# Patient Record
Sex: FEMALE | Race: BLACK OR AFRICAN AMERICAN | Employment: UNEMPLOYED | URBAN - METROPOLITAN AREA
[De-identification: names, ages, dates, MRNs, and addresses within clinical notes are randomized per-mention and may not be internally consistent; named-entity substitution may affect disease eponyms.]

---

## 2018-02-05 ENCOUNTER — DOCUMENTATION ONLY (OUTPATIENT)
Dept: SURGERY | Age: 58
End: 2018-02-05

## 2018-02-05 NOTE — PROGRESS NOTES
Requests faxed to [facility] Pathology Dept and Radiology Dept for slides and film disc to be mailed. Pt was referred from Dr. Kota Bang to Dr. Patience Raygoza for surgical consultation for newly diagnosed breast CA (Dr. Jj Orozco doesn't accept pt's insurance). Pt's appt is scheduled for 2/13/18 at 1400.

## 2018-02-13 ENCOUNTER — OFFICE VISIT (OUTPATIENT)
Dept: SURGERY | Age: 58
End: 2018-02-13

## 2018-02-13 VITALS
SYSTOLIC BLOOD PRESSURE: 108 MMHG | HEART RATE: 88 BPM | WEIGHT: 115 LBS | TEMPERATURE: 97.9 F | DIASTOLIC BLOOD PRESSURE: 78 MMHG | RESPIRATION RATE: 16 BRPM

## 2018-02-13 DIAGNOSIS — C50.911 STAGE 1 BREAST CANCER, ER-, RIGHT (HCC): Primary | ICD-10-CM

## 2018-02-13 DIAGNOSIS — Z17.1 STAGE 1 BREAST CANCER, ER-, RIGHT (HCC): Primary | ICD-10-CM

## 2018-02-13 NOTE — PROGRESS NOTES
[facility]  General Surgery    Subjective:      HPI: Patient is a very pleasant 59-year-old female with a past medical history is remarkable for hepatitis B and tuberculosis. She is referred by Dr. Racheal Jiménez for evaluation and management of a stage I T1 NXMX ER negative TN negative HER-2 positive right breast cancer. The patient had a palpable mass in the upper outer quadrant of the right breast which have been present since 2016. Mammograms and ultrasounds were performed. I did review the ultrasound independently on the monitor. I discussed the findings on the pathology report with the patient and her female friend. I described the meaning of the receptor status and his implications and treatment with hormonal therapy and in this case Herceptin and perjetta. I described the staging workup which includes bilateral breast MRI, CT chest abdomen pelvis and whole-body bone scan. There are no active problems to display for this patient. Past Medical History:   Diagnosis Date    Hepatitis B     Tuberculosis 1980      Past Surgical History:   Procedure Laterality Date    HX GYN      hysterectomy     HX OTHER SURGICAL      neck surgery       History reviewed. No pertinent family history. Social History   Substance Use Topics    Smoking status: Never Smoker    Smokeless tobacco: Current User    Alcohol use 1.2 oz/week     2 Cans of beer per week      Comment: weekly       No Known Allergies    Prior to Admission medications    Not on File       Review of Systems:    14 systems were reviewed. The results are as above in the HPI and otherwise negative. Objective:       Physical Exam:  GENERAL: alert, cooperative, no distress, appears stated age,   EYE: conjunctivae/corneas clear. PERRL, EOM's intact.  Fundi benign,  THROAT & NECK: normal and no erythema or exudates noted. ,    LYMPHATIC: Cervical, supraclavicular, and axillary nodes normal. ,   LUNG: clear to auscultation bilaterally,   HEART: regular rate and rhythm, S1, S2 normal, no murmur, click, rub or gallop  BREAST: B cup breasts   With the patient's hands above her head and her sitting upright position the mass in the upper outer quadrant of the right breast is obvious. Right: Axillary and supraclavicular lymph nodes are negative  Palpable mass immobile measures approximately 2 x 0.5 cm  Left: Axillary and supraclavicular lymph nodes are negative  No palpable masses or nipple drainage or discharge can be expressed. ABDOMEN: soft, non-tender. Bowel sounds normal. No masses,  no organomegaly,  EXTREMITIES:  extremities normal, atraumatic, no cyanosis or edema,   SKIN: Normal.,   NEUROLOGIC: AOx3. Gait normal. Reflexes and motor strength normal and symmetric. Cranial nerves 2-12 and sensation grossly intact. ,     Data Review: Mammography     Ms. Elizabeth Monson has a reminder for a \"due or due soon\" health maintenance. I have asked that she contact her primary care provider for follow-up on this health maintenance. Impression:     · Patient with stage I ER CA negative HER-2 positive right breast cancer    Plan:     · Referral to medical oncology to discuss neoadjuvant therapy  · Staging workup to include CT chest abdomen pelvis, whole body bone scan and bilateral breast MRI. · The patient will follow with us once the consultations and preoperative testing have been completed.     Signed By: Francisco Stern MD     February 13, 2018

## 2018-02-14 ENCOUNTER — HOSPITAL ENCOUNTER (OUTPATIENT)
Dept: LAB | Age: 58
Discharge: HOME | End: 2018-02-14
Payer: MEDICAID

## 2018-02-14 ENCOUNTER — HOSPITAL ENCOUNTER (OUTPATIENT)
Dept: ONCOLOGY | Age: 58
Discharge: HOME | End: 2018-02-14

## 2018-02-14 ENCOUNTER — OFFICE VISIT (OUTPATIENT)
Dept: ONCOLOGY | Age: 58
End: 2018-02-14

## 2018-02-14 VITALS — WEIGHT: 112.4 LBS | SYSTOLIC BLOOD PRESSURE: 132 MMHG | DIASTOLIC BLOOD PRESSURE: 79 MMHG

## 2018-02-14 DIAGNOSIS — Z17.1 MALIGNANT NEOPLASM OF UPPER-OUTER QUADRANT OF RIGHT BREAST IN FEMALE, ESTROGEN RECEPTOR NEGATIVE (HCC): ICD-10-CM

## 2018-02-14 DIAGNOSIS — C50.411 MALIGNANT NEOPLASM OF UPPER-OUTER QUADRANT OF RIGHT BREAST IN FEMALE, ESTROGEN RECEPTOR NEGATIVE (HCC): ICD-10-CM

## 2018-02-14 DIAGNOSIS — C50.411 MALIGNANT NEOPLASM OF UPPER-OUTER QUADRANT OF RIGHT BREAST IN FEMALE, ESTROGEN RECEPTOR NEGATIVE (HCC): Primary | ICD-10-CM

## 2018-02-14 DIAGNOSIS — Z17.1 MALIGNANT NEOPLASM OF UPPER-OUTER QUADRANT OF RIGHT BREAST IN FEMALE, ESTROGEN RECEPTOR NEGATIVE (HCC): Primary | ICD-10-CM

## 2018-02-14 PROBLEM — B19.11: Status: ACTIVE | Noted: 2018-02-14

## 2018-02-14 LAB
ALBUMIN SERPL-MCNC: 3.7 G/DL (ref 3.4–5)
ALBUMIN/GLOB SERPL: 0.9 {RATIO} (ref 0.8–1.7)
ALP SERPL-CCNC: 69 U/L (ref 45–117)
ALT SERPL-CCNC: 16 U/L (ref 13–56)
ANION GAP SERPL CALC-SCNC: 8 MMOL/L (ref 3–18)
AST SERPL-CCNC: 12 U/L (ref 15–37)
BASO+EOS+MONOS # BLD AUTO: 0.5 K/UL (ref 0–2.3)
BASO+EOS+MONOS # BLD AUTO: 8 % (ref 0.1–17)
BILIRUB SERPL-MCNC: 0.6 MG/DL (ref 0.2–1)
BUN SERPL-MCNC: 11 MG/DL (ref 7–18)
BUN/CREAT SERPL: 18 (ref 12–20)
CALCIUM SERPL-MCNC: 9.5 MG/DL (ref 8.5–10.1)
CHLORIDE SERPL-SCNC: 105 MMOL/L (ref 100–108)
CO2 SERPL-SCNC: 26 MMOL/L (ref 21–32)
CREAT SERPL-MCNC: 0.61 MG/DL (ref 0.6–1.3)
DIFFERENTIAL METHOD BLD: ABNORMAL
ERYTHROCYTE [DISTWIDTH] IN BLOOD BY AUTOMATED COUNT: 14.3 % (ref 11.5–14.5)
GLOBULIN SER CALC-MCNC: 4.3 G/DL (ref 2–4)
GLUCOSE SERPL-MCNC: 80 MG/DL (ref 74–99)
HCT VFR BLD AUTO: 36.1 % (ref 36–48)
HGB BLD-MCNC: 11.7 G/DL (ref 12–16)
LYMPHOCYTES # BLD: 2 K/UL (ref 1.1–5.9)
LYMPHOCYTES NFR BLD: 29 % (ref 14–44)
MCH RBC QN AUTO: 30.1 PG (ref 25–35)
MCHC RBC AUTO-ENTMCNC: 32.4 G/DL (ref 31–37)
MCV RBC AUTO: 92.8 FL (ref 78–102)
NEUTS SEG # BLD: 4.2 K/UL (ref 1.8–9.5)
NEUTS SEG NFR BLD: 62 % (ref 40–70)
PLATELET # BLD AUTO: 211 K/UL (ref 140–440)
POTASSIUM SERPL-SCNC: 3.6 MMOL/L (ref 3.5–5.5)
PROT SERPL-MCNC: 8 G/DL (ref 6.4–8.2)
RBC # BLD AUTO: 3.89 M/UL (ref 4.1–5.1)
SODIUM SERPL-SCNC: 139 MMOL/L (ref 136–145)
WBC # BLD AUTO: 6.7 K/UL (ref 4.5–13)

## 2018-02-14 PROCEDURE — 86300 IMMUNOASSAY TUMOR CA 15-3: CPT | Performed by: INTERNAL MEDICINE

## 2018-02-14 PROCEDURE — 36415 COLL VENOUS BLD VENIPUNCTURE: CPT | Performed by: INTERNAL MEDICINE

## 2018-02-14 PROCEDURE — 80053 COMPREHEN METABOLIC PANEL: CPT | Performed by: INTERNAL MEDICINE

## 2018-02-14 RX ORDER — FAMOTIDINE 40 MG/1
40 TABLET, FILM COATED ORAL
COMMUNITY
End: 2018-06-06 | Stop reason: SDUPTHER

## 2018-02-14 RX ORDER — SUCRALFATE 1 G/1
1 TABLET ORAL
COMMUNITY
End: 2018-10-16

## 2018-02-14 NOTE — PATIENT INSTRUCTIONS
Breast Cancer: Care Instructions  Your Care Instructions    Breast cancer occurs when abnormal cells grow out of control in the breast. These cancer cells can spread within the breast, to nearby lymph nodes and other tissues, and to other parts of the body. Being treated for cancer can weaken your body, and you may feel very tired. Get the rest your body needs so you can feel better. Finding out that you have cancer is scary. You may feel many emotions and may need some help coping. Seek out family, friends, and counselors for support. You also can do things at home to make yourself feel better while you go through treatment. Call the [facility] (2-469.400.3969) or visit its website at [employer] for more information. Follow-up care is a key part of your treatment and safety. Be sure to make and go to all appointments, and call your doctor if you are having problems. It's also a good idea to know your test results and keep a list of the medicines you take. How can you care for yourself at home? · Take your medicines exactly as prescribed. Call your doctor if you think you are having a problem with your medicine. You may get medicine for nausea and vomiting if you have these side effects. · Follow your doctor's instructions to relieve pain. Pain from cancer and surgery can almost always be controlled. Use pain medicine when you first notice pain, before it becomes severe. · Eat healthy food. If you do not feel like eating, try to eat food that has protein and extra calories to keep up your strength and prevent weight loss. Drink liquid meal replacements for extra calories and protein. Try to eat your main meal early. · Get some physical activity every day, but do not get too tired. Keep doing the hobbies you enjoy as your energy allows. · Do not smoke. Smoking can make your cancer worse. If you need help quitting, talk to your doctor about stop-smoking programs and medicines.  These can increase your chances of quitting for good. · Take steps to control your stress and workload. Learn relaxation techniques. ¨ Share your feelings. Stress and tension affect our emotions. By expressing your feelings to others, you may be able to understand and cope with them. ¨ Consider joining a support group. Talking about a problem with your spouse, a good friend, or other people with similar problems is a good way to reduce tension and stress. ¨ Express yourself through art. Try writing, crafts, dance, or art to relieve stress. Some dance, writing, or art groups may be available just for people who have cancer. ¨ Be kind to your body and mind. Getting enough sleep, eating a healthy diet, and taking time to do things you enjoy can contribute to an overall feeling of balance in your life and can help reduce stress. ¨ Get help if you need it. Discuss your concerns with your doctor or counselor. · If you are vomiting or have diarrhea:  ¨ Drink plenty of fluids (enough so that your urine is light yellow or clear like water) to prevent dehydration. Choose water and other caffeine-free clear liquids. If you have kidney, heart, or liver disease and have to limit fluids, talk with your doctor before you increase the amount of fluids you drink. ¨ When you are able to eat, try clear soups, mild foods, and liquids until all symptoms are gone for 12 to 48 hours. Other good choices include dry toast, crackers, cooked cereal, and gelatin dessert, such as Jell-O.  · If you have not already done so, prepare a list of advance directives. Advance directives are instructions to your doctor and family members about what kind of care you want if you become unable to speak or express yourself. When should you call for help? Call 911 anytime you think you may need emergency care. For example, call if:  ? · You passed out (lost consciousness). ?Call your doctor now or seek immediate medical care if:  ? · You have a fever. ? · You have abnormal bleeding. ? · You think you have an infection. ? · You have new or worse pain. ? · You have new symptoms, such as a cough, belly pain, vomiting, diarrhea, or a rash. ? Watch closely for changes in your health, and be sure to contact your doctor if:  ? · You are much more tired than usual.   ? · You have swollen glands in your armpits, groin, or neck. ? · You do not get better as expected. Where can you learn more? Go to http://catalino-tiesha.info/. Enter V321 in the search box to learn more about \"Breast Cancer: Care Instructions. \"  Current as of: May 12, 2017  Content Version: 11.4  © 9589-3978 [employer]. Care instructions adapted under license by [employer] (which disclaims liability or warranty for this information). If you have questions about a medical condition or this instruction, always ask your healthcare professional. [address] any warranty or liability for your use of this information.

## 2018-02-14 NOTE — PROGRESS NOTES
Hematology/Oncology Consultation Note    Name: Lotus Castro  Date: 2018  : 1960    PROVIDER UNKNOWN       Ms. Darvin Horne  is a 62 y.o. -American woman who was referred for an evaluation of invasive ductal carcinoma involving the right breast.    Subjective:   Chief complaint: Breast cancer    History of present illness:  Ms. Darvin Horne is a 59-year-old -American woman who states that she noticed a palpable mass in her right breast in 2017. She subsequently had a mammogram and ultrasound done followed by a biopsy which was positive for invasive ductal adenocarcinoma, ER/UT negative and HER-2 positive. She was seen by a surgeon, Dr. Humble Marion,  but was subsequently referred to Dr. Jerry Holt, since Dr. Humble Marion did not accept her insurance. The patient denied having nipple discharge, nipple retraction, or skin dimpling. She denied having any pain in her right breast.  Based on these findings of HER-2 positive and hormone receptor negative she was referred here for an assessment and recommendations regarding neoadjuvant systemic therapy. The patient is not certain that she has any positive family history for breast cancer however she was told that her grandmother may have had breast cancer. Past Medical History:   Diagnosis Date    Hepatitis B     Tuberculosis        No Known Allergies    Past Surgical History:   Procedure Laterality Date    HX GYN      hysterectomy     HX OTHER SURGICAL      neck surgery        Social History     Social History    Marital status: SINGLE     Spouse name: N/A    Number of children: N/A    Years of education: N/A     Occupational History    Not on file.      Social History Main Topics    Smoking status: Never Smoker    Smokeless tobacco: Current User    Alcohol use 1.2 oz/week     2 Cans of beer per week      Comment: weekly     Drug use: No    Sexual activity: No     Other Topics Concern    Not on file     Social History Narrative       Family History   Problem Relation Age of Onset    Stroke Mother     Heart Disease Father     Cancer Maternal Grandmother        Review of Systems    General ROS:The patient has no complaints and there is no physical distress evident. Psychological ROS: patient denies having any psychological symptoms such as hallucinations, depression or anxiety. Ophthalmic ROS:the patient denies having any visual impairment or eye discomfort. ENT ROS: there are no abnormalities reported. Allergy and Immunology ROS:the patient denies having any seasonal allergies or allergies to medications other than those already outlined above. Hematological and Lymphatic ROS: the patient denies having any bruising, bleeding or lymphadenopathy. Endocrine ROS: the patient denies having any heat or cold intolerance. There is no history of diabetes or thyroid disorders. Breast ROS: the patient was recently diagnosed with an invasive ductal adenocarcinoma involving the right breast.  Respiratory ROS:the patient denies having any cough, shortness of breath, or dyspnea on exertion. Cardiovascular ROS: there are no complaints of chest pain, palpitations, chest pounding, or dyspnea on exertion. Gastrointestinal ROS: the patient denies having nausea, emesis, diarrhea, constipation, or blood in the stool. Genito-Urinary ROS: the patient denies having urinary urgency, frequency, or dysuria. Musculoskeletal ROS: with the exception of mild arthralgias the patient has no other musculoskeletal complaints. Neurological ROS: the patient denies having any numbness, tingling, or neurologic deficits. Dermatological ROS:patient denies having any unexplained rash, skin ulcerations, or hives. Objective:     Visit Vitals    /79 (BP 1 Location: Right arm)    Wt 51 kg (112 lb 6.4 oz)        Physical Exam:   Gen. Appearance: the patient is in no acute distress. Skin: There is no evidence of bruise or rash.   HEENT: The head is normocephalic and atraumatic. The conjunctiva and sclera are clear. Pupils are equal, round, reactive to light, and accommodation. The extraocular movements are intact. ENT reveals no oral mucosal lesions or ulcerations. Neck: Supple without lymphadenopathy or thyromegaly. Lungs: Clear to auscultation and percussion; there are no wheezes or rhonchi. Anterior chest wall and breast: The patient has a palpable mass lesion in the outer upper quadrant of her right breast grossly measuring about 3 cm in size. The right axilla reveals no palpable axillary lymphadenopathy. There is no nipple discharge, skin rash, or skin dimpling. The left breast shows no abnormality and there is no palpable lymphadenopathy in the left axilla. Heart: Regular rate and rhythm; there are no murmurs, gallops, or rubs. Abdomen: Bowel sounds are present and normal.  There is no guarding, tenderness, or hepatosplenomegaly. Extremities: There is no clubbing, cyanosis, or edema. Neurologic: There are no focal neurologic deficits. Lymphatics: There is no palpable peripheral lymphadenopathy. Lab data: The pathology report dated 1/30/2018 right breast biopsy revealed invasive carcinoma with apocrine features. There was also evidence of ductal carcinoma in situ. The estrogen receptor and progesterone receptors were negative however HER-2 was 3+ positive. Assessment:   Invasive ductal adenocarcinoma right breast, hormone receptor negative and HER-2 positive. I have explained to the patient and with her phenotype she is a candidate for neoadjuvant systemic therapy which will be followed by surgery. Plan:   Invasive ductal adenocarcinoma, right breast, hormone receptor negative, and HER-2 positive: At this time I am recommending that the patient proceed with the staging evaluation as recommended by Dr. Shama Jin which includes the CT scan through the chest, abdomen, and pelvis, whole body bone scan, and bilateral breast MRI.   The patient will need to have a Mediport inserted in preparation for neoadjuvant systemic therapy with a combination of docetaxel and a dose of 75 mg/m² IV on day 1, trastuzumab 8 mg/kg IV loading dose on day 1 then 6 mg/kg IV every 3 weeks, and pertuzumab and a loading dose of 840 mg IV on day 1 and then 420 mg IV every 3 weeks thereafter for about 6 cycles. This will be followed by surgery. Additionally we will obtain a baseline CA-27-29 level and the patient will be referred to the nuclear medicine department for a pretreatment MUGA scan. I will have her return to the clinic in about 3 weeks to finalize a treatment regimen after these procedures and tests have been completed. Follow-up in 3 weeks0    Orders Placed This Encounter    COMPLETE CBC & AUTO DIFF WBC    NM CARDIAC MUGA REST AND STRESS     Standing Status:   Future     Standing Expiration Date:   3/14/2019     Order Specific Question:   Reason for Exam     Answer:   per-treatment protocol    METABOLIC PANEL, COMPREHENSIVE     Standing Status:   Future     Number of Occurrences:   1     Standing Expiration Date:   2/15/2019    CA 27.29     Standing Status:   Future     Number of Occurrences:   1     Standing Expiration Date:   2/15/2019    InHouse CBC ([email or web address])     Standing Status:   Future     Number of Occurrences:   1     Standing Expiration Date:   2018    famotidine (PEPCID) 40 mg tablet     Si mg.    sucralfate (CARAFATE) 1 gram tablet     Si g. Pretty Doty MD  2018      Please note: This document has been produced using voice recognition software. Unrecognized errors in transcription may be present.

## 2018-02-14 NOTE — MR AVS SNAPSHOT
[address] [address] 
[phone] Patient: Vince Prescott MRN: [id number] YCI:8[birth year] Visit Information Date & Time Provider Department Dept. Phone Encounter #  
 2/14/2018  2:00 PM Stefanie Mcdowell MD [facility] [phone] [id number] Follow-up Instructions Return in about 3 weeks (around 3/7/2018). Your Appointments 3/5/2018  9:30 AM  
Office Visit with Stefanie Mcdowell MD  
[facility]) Appt Note: 3 WK RESULTS REVIEW  
 [address], [address] [place] [address] Upcoming Health Maintenance Date Due Pneumococcal 19-64 Highest Risk (1 of 3 - PCV13) 9/5/1979 DTaP/Tdap/Td series (1 - Tdap) 9/5/1981 PAP AKA CERVICAL CYTOLOGY 9/5/1981 BREAST CANCER SCRN MAMMOGRAM 9/5/2010 FOBT Q 1 YEAR AGE 50-75 9/5/2010 Influenza Age 5 to Adult 8/1/2017 Allergies as of 2/14/2018  Review Complete On: 2/13/2018 By: Cullen Ohara MD  
 No Known Allergies Current Immunizations  Never Reviewed No immunizations on file. Not reviewed this visit You Were Diagnosed With   
  
 Codes Comments Malignant neoplasm of upper-outer quadrant of right breast in female, estrogen receptor negative ([facility] [address]    -  Primary ICD-10-CM: C50.411, Z17.1 ICD-9-CM: 174.4, V86.1 Vitals BP Weight(growth percentile) OB Status Smoking Status 132/79 (BP 1 Location: Right arm) 112 lb 6.4 oz (51 kg) Hysterectomy Never Smoker Your Updated Medication List  
  
Notice  As of 2/14/2018  2:14 PM  
 You have not been prescribed any medications. Follow-up Instructions Return in about 3 weeks (around 3/7/2018). To-Do List   
 02/14/2018 Lab:  CA 27.29   
  
 02/14/2018 Lab: METABOLIC PANEL, COMPREHENSIVE Patient Instructions Breast Cancer: Care Instructions Your Care Instructions Breast cancer occurs when abnormal cells grow out of control in the breast. These cancer cells can spread within the breast, to nearby lymph nodes and other tissues, and to other parts of the body. Being treated for cancer can weaken your body, and you may feel very tired. Get the rest your body needs so you can feel better. Finding out that you have cancer is scary. You may feel many emotions and may need some help coping. Seek out family, friends, and counselors for support. You also can do things at home to make yourself feel better while you go through treatment. Call the [employer] (8-851.722.5157) or visit its website at Pluto Media8 Artimi for more information. Follow-up care is a key part of your treatment and safety. Be sure to make and go to all appointments, and call your doctor if you are having problems. It's also a good idea to know your test results and keep a list of the medicines you take. How can you care for yourself at home? · Take your medicines exactly as prescribed. Call your doctor if you think you are having a problem with your medicine. You may get medicine for nausea and vomiting if you have these side effects. · Follow your doctor's instructions to relieve pain. Pain from cancer and surgery can almost always be controlled. Use pain medicine when you first notice pain, before it becomes severe. · Eat healthy food. If you do not feel like eating, try to eat food that has protein and extra calories to keep up your strength and prevent weight loss. Drink liquid meal replacements for extra calories and protein. Try to eat your main meal early. · Get some physical activity every day, but do not get too tired. Keep doing the hobbies you enjoy as your energy allows. · Do not smoke. Smoking can make your cancer worse.  If you need help quitting, talk to your doctor about stop-smoking programs and medicines. These can increase your chances of quitting for good. · Take steps to control your stress and workload. Learn relaxation techniques. ¨ Share your feelings. Stress and tension affect our emotions. By expressing your feelings to others, you may be able to understand and cope with them. ¨ Consider joining a support group. Talking about a problem with your spouse, a good friend, or other people with similar problems is a good way to reduce tension and stress. ¨ Express yourself through art. Try writing, crafts, dance, or art to relieve stress. Some dance, writing, or art groups may be available just for people who have cancer. ¨ Be kind to your body and mind. Getting enough sleep, eating a healthy diet, and taking time to do things you enjoy can contribute to an overall feeling of balance in your life and can help reduce stress. ¨ Get help if you need it. Discuss your concerns with your doctor or counselor. · If you are vomiting or have diarrhea: ¨ Drink plenty of fluids (enough so that your urine is light yellow or clear like water) to prevent dehydration. Choose water and other caffeine-free clear liquids. If you have kidney, heart, or liver disease and have to limit fluids, talk with your doctor before you increase the amount of fluids you drink. ¨ When you are able to eat, try clear soups, mild foods, and liquids until all symptoms are gone for 12 to 48 hours. Other good choices include dry toast, crackers, cooked cereal, and gelatin dessert, such as Jell-O. · If you have not already done so, prepare a list of advance directives. Advance directives are instructions to your doctor and family members about what kind of care you want if you become unable to speak or express yourself. When should you call for help? Call 911 anytime you think you may need emergency care. For example, call if: 
? · You passed out (lost consciousness). ?Call your doctor now or seek immediate medical care if: 
? · You have a fever. ? · You have abnormal bleeding. ? · You think you have an infection. ? · You have new or worse pain. ? · You have new symptoms, such as a cough, belly pain, vomiting, diarrhea, or a rash. ? Watch closely for changes in your health, and be sure to contact your doctor if: 
? · You are much more tired than usual.  
? · You have swollen glands in your armpits, groin, or neck. ? · You do not get better as expected. Where can you learn more? Go to http://catalino-tiesha.info/. Enter V321 in the search box to learn more about \"Breast Cancer: Care Instructions. \" Current as of: May 12, 2017 Content Version: 11.4 © 1614-6644 Zyncd. Care instructions adapted under license by [employer] (which disclaims liability or warranty for this information). If you have questions about a medical condition or this instruction, always ask your healthcare professional. [place] [address] any warranty or liability for your use of this information. Introducing \[facility]\"" & HEALTH SERVICES! [facility] introduces SummuS Render patient portal. Now you can access parts of your medical record, email your doctor's office, and request medication refills online. 1. In your internet browser, go to [email or web address] 2. Click on the First Time User? Click Here link in the Sign In box. You will see the New Member Sign Up page. 3. Enter your SummuS Render Access Code exactly as it appears below. You will not need to use this code after youve completed the sign-up process. If you do not sign up before the expiration date, you must request a new code. · SummuS Render Access Code: [email or web address] Expires: 5/14/2018 10:01 AM 
 
4. Enter the last four digits of your Social Security Number (xxxx) and Date of Birth (mm/dd/yyyy) as indicated and click Submit.  You will be taken to the next sign-up page. 5. Create a Bridge ID. This will be your Bridge login ID and cannot be changed, so think of one that is secure and easy to remember. 6. Create a Bridge password. You can change your password at any time. 7. Enter your Password Reset Question and Answer. This can be used at a later time if you forget your password. 8. Enter your e-mail address. You will receive e-mail notification when new information is available in [address] 9. Click Sign Up. You can now view and download portions of your medical record. 10. Click the Download Summary menu link to download a portable copy of your medical information. If you have questions, please visit the Frequently Asked Questions section of the Bridge website. Remember, Bridge is NOT to be used for urgent needs. For medical emergencies, dial 911. Now available from your iPhone and Android! Please provide this summary of care documentation to your next provider. If you have any questions after today's visit, please call Ángela Pugh.

## 2018-02-15 LAB — CANCER AG27-29 SERPL-ACNC: 30.2 U/ML (ref 0–38.6)

## 2018-02-19 ENCOUNTER — HOSPITAL ENCOUNTER (OUTPATIENT)
Dept: LAB | Age: 58
Discharge: HOME | End: 2018-02-19

## 2018-02-27 ENCOUNTER — HOSPITAL ENCOUNTER (OUTPATIENT)
Age: 58
Discharge: HOME | End: 2018-02-27
Attending: SURGERY
Payer: MEDICAID

## 2018-02-27 DIAGNOSIS — C50.411 MALIGNANT NEOPLASM OF UPPER-OUTER QUADRANT OF RIGHT BREAST IN FEMALE, ESTROGEN RECEPTOR NEGATIVE (HCC): ICD-10-CM

## 2018-02-27 DIAGNOSIS — Z17.1 MALIGNANT NEOPLASM OF UPPER-OUTER QUADRANT OF RIGHT BREAST IN FEMALE, ESTROGEN RECEPTOR NEGATIVE (HCC): ICD-10-CM

## 2018-02-27 PROCEDURE — 77059 MRI BREAST BI W WO CONT: CPT

## 2018-02-27 PROCEDURE — A9585 GADOBUTROL INJECTION: HCPCS | Performed by: SURGERY

## 2018-02-27 PROCEDURE — 74011250636 HC RX REV CODE- 250/636: Performed by: SURGERY

## 2018-02-27 RX ADMIN — GADOBUTROL 5 ML: 604.72 INJECTION INTRAVENOUS at 12:00

## 2018-02-28 ENCOUNTER — HOSPITAL ENCOUNTER (OUTPATIENT)
Dept: NUCLEAR MEDICINE | Age: 58
Discharge: HOME | End: 2018-02-28
Attending: SURGERY
Payer: MEDICAID

## 2018-02-28 ENCOUNTER — HOSPITAL ENCOUNTER (OUTPATIENT)
Dept: CT IMAGING | Age: 58
Discharge: HOME | End: 2018-02-28
Attending: SURGERY
Payer: MEDICAID

## 2018-02-28 DIAGNOSIS — Z17.1 MALIGNANT NEOPLASM OF UPPER-OUTER QUADRANT OF RIGHT BREAST IN FEMALE, ESTROGEN RECEPTOR NEGATIVE (HCC): ICD-10-CM

## 2018-02-28 DIAGNOSIS — C50.411 MALIGNANT NEOPLASM OF UPPER-OUTER QUADRANT OF RIGHT BREAST IN FEMALE, ESTROGEN RECEPTOR NEGATIVE (HCC): ICD-10-CM

## 2018-02-28 PROCEDURE — 74011636320 HC RX REV CODE- 636/320: Performed by: SURGERY

## 2018-02-28 PROCEDURE — A9503 TC99M MEDRONATE: HCPCS

## 2018-02-28 PROCEDURE — 78306 BONE IMAGING WHOLE BODY: CPT

## 2018-02-28 PROCEDURE — 71260 CT THORAX DX C+: CPT

## 2018-02-28 RX ADMIN — IOPAMIDOL 100 ML: 612 INJECTION, SOLUTION INTRAVENOUS at 08:39

## 2018-03-02 ENCOUNTER — HOSPITAL ENCOUNTER (OUTPATIENT)
Dept: NON INVASIVE DIAGNOSTICS | Age: 58
Discharge: HOME | End: 2018-03-02
Attending: INTERNAL MEDICINE
Payer: MEDICAID

## 2018-03-02 DIAGNOSIS — Z17.1 MALIGNANT NEOPLASM OF UPPER-OUTER QUADRANT OF RIGHT BREAST IN FEMALE, ESTROGEN RECEPTOR NEGATIVE (HCC): ICD-10-CM

## 2018-03-02 DIAGNOSIS — C50.411 MALIGNANT NEOPLASM OF UPPER-OUTER QUADRANT OF RIGHT BREAST IN FEMALE, ESTROGEN RECEPTOR NEGATIVE (HCC): ICD-10-CM

## 2018-03-02 DIAGNOSIS — C50.411 MALIGNANT NEOPLASM OF UPPER-OUTER QUADRANT OF RIGHT FEMALE BREAST (HCC): ICD-10-CM

## 2018-03-02 DIAGNOSIS — Z17.1 ESTROGEN RECEPTOR NEGATIVE: ICD-10-CM

## 2018-03-02 PROCEDURE — 78473 GATED HEART MULTIPLE: CPT

## 2018-03-02 PROCEDURE — 74011250636 HC RX REV CODE- 250/636

## 2018-03-02 PROCEDURE — A9560 TC99M LABELED RBC: HCPCS

## 2018-03-02 RX ORDER — HEPARIN SODIUM (PORCINE) LOCK FLUSH IV SOLN 100 UNIT/ML 100 UNIT/ML
SOLUTION INTRAVENOUS
Status: COMPLETED
Start: 2018-03-02 | End: 2018-03-02

## 2018-03-02 RX ADMIN — HEPARIN SODIUM (PORCINE) LOCK FLUSH IV SOLN 100 UNIT/ML 45 UNITS: 100 SOLUTION at 10:25

## 2018-03-02 NOTE — PROGRESS NOTES
Patient was injected with  32.8 milliCuries 99mTc-Ultratag labeled RBCs. Patient's armband was discarded and shredded.

## 2018-03-02 NOTE — PROCEDURES
[address] [facility]  MR#: [id number]  : 1960  ACCOUNT #: [de-identified]   DATE OF SERVICE: 2018    PROCEDURE:  Cardiac MUGA. INDICATION:  Right breast cancer, prechemotherapy. PROTOCOL:  The patient was given 32.8 mCi of technetium 99m ultratag red blood cells through a left antecubital IV. Ejection fraction was calculated with manual and automatic. Wall motion was observed in the Serbian, left lateral, anterior projections. FINDINGS:    -No evidence of regional wall motion abnormalities based on gated imaging.    -Ejection fraction is 58% by manual processing and 52% by automatic processing.       MD GERARD Ortega / MARIE  D: 2018 11:08     T: 2018 15:51  JOB #: [id number]

## 2018-03-05 ENCOUNTER — OFFICE VISIT (OUTPATIENT)
Dept: ONCOLOGY | Age: 58
End: 2018-03-05

## 2018-03-05 VITALS — HEART RATE: 85 BPM | SYSTOLIC BLOOD PRESSURE: 134 MMHG | DIASTOLIC BLOOD PRESSURE: 78 MMHG | TEMPERATURE: 98.5 F

## 2018-03-05 DIAGNOSIS — C50.411 MALIGNANT NEOPLASM OF UPPER-OUTER QUADRANT OF RIGHT BREAST IN FEMALE, ESTROGEN RECEPTOR NEGATIVE (HCC): Primary | ICD-10-CM

## 2018-03-05 DIAGNOSIS — Z17.1 MALIGNANT NEOPLASM OF UPPER-OUTER QUADRANT OF RIGHT BREAST IN FEMALE, ESTROGEN RECEPTOR NEGATIVE (HCC): Primary | ICD-10-CM

## 2018-03-05 NOTE — MR AVS SNAPSHOT
[address] 
[phone] Patient: De Camilo MRN: [id number] AZG:[birth year] Visit Information Date & Time Provider Department Dept. Phone Encounter #  
 3/5/2018  9:30 AM Hossein Zavala MD [facility] [phone] [id number] Follow-up Instructions Return in about 2 weeks (around 3/19/2018). Your Appointments 3/19/2018  9:00 AM  
Office Visit with Hossein Zavala MD  
[facility]) Appt Note: 2 WK RET  
 [address], [address] [place] [address] Upcoming Health Maintenance Date Due Pneumococcal 19-64 Highest Risk (1 of 3 - PCV13) 9/5/1979 DTaP/Tdap/Td series (1 - Tdap) 9/5/1981 PAP AKA CERVICAL CYTOLOGY 9/5/1981 BREAST CANCER SCRN MAMMOGRAM 9/5/2010 FOBT Q 1 YEAR AGE 50-75 9/5/2010 Influenza Age 5 to Adult 8/1/2017 Allergies as of 3/5/2018  Review Complete On: 3/5/2018 By: Hossein Zavala MD  
 No Known Allergies Current Immunizations  Never Reviewed No immunizations on file. Not reviewed this visit You Were Diagnosed With   
  
 Codes Comments Malignant neoplasm of upper-outer quadrant of right breast in female, estrogen receptor negative [facility]    -  Primary ICD-10-CM: C50.411, Z17.1 ICD-9-CM: 174.4, V86.1 Vitals BP Pulse Temp OB Status Smoking Status 134/78 85 98.5 °F (36.9 °C) Hysterectomy Never Smoker Your Updated Medication List  
  
   
This list is accurate as of 3/5/18 12:13 PM.  Always use your most recent med list.  
  
  
  
  
 famotidine 40 mg tablet Commonly known as:  PEPCID  
40 mg.  
  
 sucralfate 1 gram tablet Commonly known as:  CARAFATE  
1 g. Follow-up Instructions Return in about 2 weeks (around 3/19/2018). Patient Instructions Breast Cancer: Care Instructions Your Care Instructions Breast cancer occurs when abnormal cells grow out of control in the breast. These cancer cells can spread within the breast, to nearby lymph nodes and other tissues, and to other parts of the body. Being treated for cancer can weaken your body, and you may feel very tired. Get the rest your body needs so you can feel better. Finding out that you have cancer is scary. You may feel many emotions and may need some help coping. Seek out family, friends, and counselors for support. You also can do things at home to make yourself feel better while you go through treatment. Call the [employer] (9-274.558.1702) or visit its website at Cartasite4 SonicLiving for more information. Follow-up care is a key part of your treatment and safety. Be sure to make and go to all appointments, and call your doctor if you are having problems. It's also a good idea to know your test results and keep a list of the medicines you take. How can you care for yourself at home? · Take your medicines exactly as prescribed. Call your doctor if you think you are having a problem with your medicine. You may get medicine for nausea and vomiting if you have these side effects. · Follow your doctor's instructions to relieve pain. Pain from cancer and surgery can almost always be controlled. Use pain medicine when you first notice pain, before it becomes severe. · Eat healthy food. If you do not feel like eating, try to eat food that has protein and extra calories to keep up your strength and prevent weight loss. Drink liquid meal replacements for extra calories and protein. Try to eat your main meal early. · Get some physical activity every day, but do not get too tired. Keep doing the hobbies you enjoy as your energy allows. · Do not smoke. Smoking can make your cancer worse.  If you need help quitting, talk to your doctor about stop-smoking programs and medicines. These can increase your chances of quitting for good. · Take steps to control your stress and workload. Learn relaxation techniques. ¨ Share your feelings. Stress and tension affect our emotions. By expressing your feelings to others, you may be able to understand and cope with them. ¨ Consider joining a support group. Talking about a problem with your spouse, a good friend, or other people with similar problems is a good way to reduce tension and stress. ¨ Express yourself through art. Try writing, crafts, dance, or art to relieve stress. Some dance, writing, or art groups may be available just for people who have cancer. ¨ Be kind to your body and mind. Getting enough sleep, eating a healthy diet, and taking time to do things you enjoy can contribute to an overall feeling of balance in your life and can help reduce stress. ¨ Get help if you need it. Discuss your concerns with your doctor or counselor. · If you are vomiting or have diarrhea: ¨ Drink plenty of fluids (enough so that your urine is light yellow or clear like water) to prevent dehydration. Choose water and other caffeine-free clear liquids. If you have kidney, heart, or liver disease and have to limit fluids, talk with your doctor before you increase the amount of fluids you drink. ¨ When you are able to eat, try clear soups, mild foods, and liquids until all symptoms are gone for 12 to 48 hours. Other good choices include dry toast, crackers, cooked cereal, and gelatin dessert, such as Jell-O. · If you have not already done so, prepare a list of advance directives. Advance directives are instructions to your doctor and family members about what kind of care you want if you become unable to speak or express yourself. When should you call for help? Call 911 anytime you think you may need emergency care. For example, call if: 
? · You passed out (lost consciousness). ?Call your doctor now or seek immediate medical care if: 
? · You have a fever. ? · You have abnormal bleeding. ? · You think you have an infection. ? · You have new or worse pain. ? · You have new symptoms, such as a cough, belly pain, vomiting, diarrhea, or a rash. ? Watch closely for changes in your health, and be sure to contact your doctor if: 
? · You are much more tired than usual.  
? · You have swollen glands in your armpits, groin, or neck. ? · You do not get better as expected. Where can you learn more? Go to http://catalino-tiesha.info/. Enter V321 in the search box to learn more about \"Breast Cancer: Care Instructions. \" Current as of: May 12, 2017 Content Version: 11.4 © 7084-9320 Linkua. Care instructions adapted under license by [employer] (which disclaims liability or warranty for this information). If you have questions about a medical condition or this instruction, always ask your healthcare professional. [place] [address] any warranty or liability for your use of this information. Introducing [facility] & HEALTH SERVICES! Tanja Pate introduces LoveIt patient portal. Now you can access parts of your medical record, email your doctor's office, and request medication refills online. 1. In your internet browser, go to [email or web address] 2. Click on the First Time User? Click Here link in the Sign In box. You will see the New Member Sign Up page. 3. Enter your LoveIt Access Code exactly as it appears below. You will not need to use this code after youve completed the sign-up process. If you do not sign up before the expiration date, you must request a new code. · LoveIt Access Code: [email or web address] Expires: 5/14/2018 10:01 AM 
 
4. Enter the last four digits of your Social Security Number (xxxx) and Date of Birth (mm/dd/yyyy) as indicated and click Submit.  You will be taken to the next sign-up page. 5. Create a Shoka.me ID. This will be your Shoka.me login ID and cannot be changed, so think of one that is secure and easy to remember. 6. Create a Shoka.me password. You can change your password at any time. 7. Enter your Password Reset Question and Answer. This can be used at a later time if you forget your password. 8. Enter your e-mail address. You will receive e-mail notification when new information is available in [address] 9. Click Sign Up. You can now view and download portions of your medical record. 10. Click the Download Summary menu link to download a portable copy of your medical information. If you have questions, please visit the Frequently Asked Questions section of the Shoka.me website. Remember, Shoka.me is NOT to be used for urgent needs. For medical emergencies, dial 911. Now available from your iPhone and Android! Please provide this summary of care documentation to your next provider. Your primary care clinician is listed as PROVIDER UNKNOWN. If you have any questions after today's visit, please call [phone].

## 2018-03-05 NOTE — PROGRESS NOTES
Hematology/medical oncology progress note    3/5/2018  Early Mercury  YOB: 1960    Diagnosis: HER-2 positive invasive ductal carcinoma, right breast    Ms. Lulú Nicholson is a 80-year-old woman who recently completed CT scans through the chest, abdomen, and pelvis with contrast.  The imaging studies confirmed a small enhancing mass in the right upper outer quadrant of the right breast.  She also had multiple bilateral pulmonary nodules consistent with her prior history of old granulomatous disease. The patient reports that she was treated for TB when she was a child. The bone scan revealed no evidence of bone metastasis. She has Invasive ductal adenocarcinoma, right breast, hormone receptor negative, and HER-2 positive: At this time I am recommending  neoadjuvant systemic therapy with a combination of docetaxel and a dose of 75 mg/m² IV on day 1, trastuzumab 8 mg/kg IV loading dose on day 1 then 6 mg/kg IV every 3 weeks, and pertuzumab and a loading dose of 840 mg IV on day 1 and then 420 mg IV every 3 weeks thereafter for about 6 cycles. This will be followed by surgery. Therefore, she will be referred back to the surgeon to have a Mediport inserted as soon as possible. She had her questions answered regarding the chemotherapy regimen. After the Mediport has been inserted she will return to the clinic to finalize her treatment and to sign the necessary consent forms for therapy. The MUGA scan results are currently pending. I will have her return in 2 weeks to finalize her treatment regimen. Total time 30 minutes, greater than 50% of the time was in counseling and coordination of care. Tito Huerta MD, Kartik Napier

## 2018-03-06 ENCOUNTER — HOSPITAL ENCOUNTER (OUTPATIENT)
Dept: GENERAL RADIOLOGY | Age: 58
Discharge: HOME | End: 2018-03-06
Payer: MEDICAID

## 2018-03-06 ENCOUNTER — HOSPITAL ENCOUNTER (OUTPATIENT)
Dept: LAB | Age: 58
Discharge: HOME | End: 2018-03-06
Payer: MEDICAID

## 2018-03-06 ENCOUNTER — OFFICE VISIT (OUTPATIENT)
Dept: SURGERY | Age: 58
End: 2018-03-06

## 2018-03-06 VITALS
TEMPERATURE: 98.2 F | RESPIRATION RATE: 16 BRPM | SYSTOLIC BLOOD PRESSURE: 102 MMHG | HEART RATE: 77 BPM | DIASTOLIC BLOOD PRESSURE: 64 MMHG | WEIGHT: 114 LBS

## 2018-03-06 DIAGNOSIS — Z17.1 STAGE 1 BREAST CANCER, ER-, RIGHT (HCC): ICD-10-CM

## 2018-03-06 DIAGNOSIS — C50.911 STAGE 1 BREAST CANCER, ER-, RIGHT (HCC): ICD-10-CM

## 2018-03-06 DIAGNOSIS — C50.911 STAGE 1 BREAST CANCER, ER-, RIGHT (HCC): Primary | ICD-10-CM

## 2018-03-06 DIAGNOSIS — Z17.1 STAGE 1 BREAST CANCER, ER-, RIGHT (HCC): Primary | ICD-10-CM

## 2018-03-06 LAB
ANION GAP SERPL CALC-SCNC: 7 MMOL/L (ref 3–18)
ATRIAL RATE: 77 BPM
BASOPHILS # BLD: 0 K/UL (ref 0–0.06)
BASOPHILS NFR BLD: 0 % (ref 0–2)
BUN SERPL-MCNC: 9 MG/DL (ref 7–18)
BUN/CREAT SERPL: 11 (ref 12–20)
CALCIUM SERPL-MCNC: 9 MG/DL (ref 8.5–10.1)
CALCULATED P AXIS, ECG09: 74 DEGREES
CALCULATED R AXIS, ECG10: 48 DEGREES
CALCULATED T AXIS, ECG11: 51 DEGREES
CHLORIDE SERPL-SCNC: 103 MMOL/L (ref 100–108)
CO2 SERPL-SCNC: 28 MMOL/L (ref 21–32)
CREAT SERPL-MCNC: 0.79 MG/DL (ref 0.6–1.3)
DIAGNOSIS, 93000: NORMAL
DIFFERENTIAL METHOD BLD: NORMAL
EOSINOPHIL # BLD: 0.1 K/UL (ref 0–0.4)
EOSINOPHIL NFR BLD: 1 % (ref 0–5)
ERYTHROCYTE [DISTWIDTH] IN BLOOD BY AUTOMATED COUNT: 14 % (ref 11.6–14.5)
GLUCOSE SERPL-MCNC: 77 MG/DL (ref 74–99)
HCT VFR BLD AUTO: 37.6 % (ref 35–45)
HGB BLD-MCNC: 12.9 G/DL (ref 12–16)
LYMPHOCYTES # BLD: 2 K/UL (ref 0.9–3.6)
LYMPHOCYTES NFR BLD: 31 % (ref 21–52)
MCH RBC QN AUTO: 30.5 PG (ref 24–34)
MCHC RBC AUTO-ENTMCNC: 34.3 G/DL (ref 31–37)
MCV RBC AUTO: 88.9 FL (ref 74–97)
MONOCYTES # BLD: 0.5 K/UL (ref 0.05–1.2)
MONOCYTES NFR BLD: 7 % (ref 3–10)
NEUTS SEG # BLD: 4 K/UL (ref 1.8–8)
NEUTS SEG NFR BLD: 61 % (ref 40–73)
P-R INTERVAL, ECG05: 182 MS
PLATELET # BLD AUTO: 255 K/UL (ref 135–420)
PMV BLD AUTO: 9.2 FL (ref 9.2–11.8)
POTASSIUM SERPL-SCNC: 3.7 MMOL/L (ref 3.5–5.5)
Q-T INTERVAL, ECG07: 376 MS
QRS DURATION, ECG06: 82 MS
QTC CALCULATION (BEZET), ECG08: 425 MS
RBC # BLD AUTO: 4.23 M/UL (ref 4.2–5.3)
SODIUM SERPL-SCNC: 138 MMOL/L (ref 136–145)
VENTRICULAR RATE, ECG03: 77 BPM
WBC # BLD AUTO: 6.6 K/UL (ref 4.6–13.2)

## 2018-03-06 PROCEDURE — 71046 X-RAY EXAM CHEST 2 VIEWS: CPT

## 2018-03-06 PROCEDURE — 36415 COLL VENOUS BLD VENIPUNCTURE: CPT | Performed by: SURGERY

## 2018-03-06 PROCEDURE — 80048 BASIC METABOLIC PNL TOTAL CA: CPT | Performed by: SURGERY

## 2018-03-06 PROCEDURE — 85025 COMPLETE CBC W/AUTO DIFF WBC: CPT | Performed by: SURGERY

## 2018-03-06 PROCEDURE — 93005 ELECTROCARDIOGRAM TRACING: CPT

## 2018-03-06 RX ORDER — SODIUM CHLORIDE 0.9 % (FLUSH) 0.9 %
5-10 SYRINGE (ML) INJECTION AS NEEDED
Status: CANCELLED | OUTPATIENT
Start: 2018-03-06

## 2018-03-06 RX ORDER — SODIUM CHLORIDE 0.9 % (FLUSH) 0.9 %
5-10 SYRINGE (ML) INJECTION EVERY 8 HOURS
Status: CANCELLED | OUTPATIENT
Start: 2018-03-06

## 2018-03-06 RX ORDER — CEPHALEXIN 250 MG/1
250 CAPSULE ORAL 2 TIMES DAILY
COMMUNITY
End: 2018-10-16

## 2018-03-06 NOTE — PROGRESS NOTES
[facility]  General Surgery    Subjective:      HPI:  Pt is a very pleasant 80-year-old female with a past medical history that is remarkable for hepatitis B and tuberculosis. She was recently diagnosed with right breast cancer ER [place] negative HER-2 positive of the upper outer quadrant. She needs a MediPort for neoadjuvant chemotherapy. Patient Active Problem List    Diagnosis Date Noted    Malignant neoplasm of upper-outer quadrant of right breast in female, estrogen receptor negative ([facility] [address] 02/14/2018    Viral hepatitis B with hepatic coma 02/14/2018    GERD (gastroesophageal reflux disease) 09/05/2014     Past Medical History:   Diagnosis Date    Hepatitis B     Tuberculosis 1980      Past Surgical History:   Procedure Laterality Date    HX GYN      hysterectomy     HX OTHER SURGICAL      neck surgery       Family History   Problem Relation Age of Onset    Stroke Mother     Heart Disease Father     Cancer Maternal Grandmother       Social History   Substance Use Topics    Smoking status: Never Smoker    Smokeless tobacco: Current User    Alcohol use 1.2 oz/week     2 Cans of beer per week      Comment: weekly       No Known Allergies    Prior to Admission medications    Medication Sig Start Date End Date Taking? Authorizing Provider   cephALEXin (KEFLEX) 250 mg capsule Take 250 mg by mouth two (2) times a day. Yes Historical Provider   famotidine (PEPCID) 40 mg tablet 40 mg. Historical Provider   sucralfate (CARAFATE) 1 gram tablet 1 g. Historical Provider       Review of Systems:    14 systems were reviewed. The results are as above in the HPI and otherwise negative. Objective:     Vitals:    03/06/18 1306   BP: 102/64   Pulse: 77   Resp: 16   Temp: 98.2 °F (36.8 °C)   Weight: 51.7 kg (114 lb)       Physical Exam:  GENERAL: alert, cooperative, no distress, appears stated age,   EYE: conjunctivae/corneas clear. PERRL, EOM's intact.   THROAT & NECK: normal and no erythema or exudates noted. ,    LYMPHATIC: Cervical, supraclavicular, and axillary nodes normal. ,   LUNG: clear to auscultation bilaterally,   HEART: regular rate and rhythm, S1, S2 normal, no murmur, click, rub or gallop,   ABDOMEN: soft, non-tender. Bowel sounds normal. No masses,  no organomegaly,   EXTREMITIES:  extremities normal, atraumatic, no cyanosis or edema,   SKIN: Normal.,   NEUROLOGIC: AOx3. Cranial nerves 2-12 and sensation grossly intact. ,     Data Review:  to be done    Ms. Charli Castellanos has a reminder for a \"due or due soon\" health maintenance. I have asked that she contact her primary care provider for follow-up on this health maintenance.   Impression:     · Patient with ER AR negative HER-2 positive invasive ductal adenocarcinoma of the upper outer quadrant of the right breast.    Plan:     · Left cephalic vein MediPort placement  · Consent on chart  · Preoperative orders written    Signed By: Erin Ralph MD     March 6, 2018

## 2018-03-07 ENCOUNTER — ANESTHESIA EVENT (OUTPATIENT)
Dept: SURGERY | Age: 58
End: 2018-03-07
Payer: MEDICAID

## 2018-03-08 ENCOUNTER — HOSPITAL ENCOUNTER (OUTPATIENT)
Age: 58
Setting detail: OUTPATIENT SURGERY
Discharge: HOME | End: 2018-03-08
Attending: SURGERY | Admitting: SURGERY
Payer: MEDICAID

## 2018-03-08 ENCOUNTER — APPOINTMENT (OUTPATIENT)
Dept: GENERAL RADIOLOGY | Age: 58
End: 2018-03-08
Attending: SURGERY
Payer: MEDICAID

## 2018-03-08 ENCOUNTER — ANESTHESIA (OUTPATIENT)
Dept: SURGERY | Age: 58
End: 2018-03-08
Payer: MEDICAID

## 2018-03-08 VITALS
HEIGHT: 65 IN | WEIGHT: 114 LBS | RESPIRATION RATE: 17 BRPM | OXYGEN SATURATION: 100 % | SYSTOLIC BLOOD PRESSURE: 120 MMHG | DIASTOLIC BLOOD PRESSURE: 78 MMHG | HEART RATE: 77 BPM | BODY MASS INDEX: 18.99 KG/M2 | TEMPERATURE: 98.3 F

## 2018-03-08 DIAGNOSIS — G89.18 POSTOPERATIVE PAIN: Primary | ICD-10-CM

## 2018-03-08 PROBLEM — C50.911 BREAST CANCER, RIGHT (HCC): Status: ACTIVE | Noted: 2018-03-08

## 2018-03-08 PROCEDURE — 77030018836 HC SOL IRR NACL ICUM -A: Performed by: SURGERY

## 2018-03-08 PROCEDURE — 76210000026 HC REC RM PH II 1 TO 1.5 HR: Performed by: SURGERY

## 2018-03-08 PROCEDURE — 77030020782 HC GWN BAIR PAWS FLX 3M -B: Performed by: SURGERY

## 2018-03-08 PROCEDURE — C1788 PORT, INDWELLING, IMP: HCPCS | Performed by: SURGERY

## 2018-03-08 PROCEDURE — 77030011640 HC PAD GRND REM COVD -A: Performed by: SURGERY

## 2018-03-08 PROCEDURE — 74011000250 HC RX REV CODE- 250: Performed by: SURGERY

## 2018-03-08 PROCEDURE — 76010000153 HC OR TIME 1.5 TO 2 HR: Performed by: SURGERY

## 2018-03-08 PROCEDURE — 77030031139 HC SUT VCRL2 J&J -A: Performed by: SURGERY

## 2018-03-08 PROCEDURE — 76210000016 HC OR PH I REC 1 TO 1.5 HR: Performed by: SURGERY

## 2018-03-08 PROCEDURE — 77030010507 HC ADH SKN DERMBND J&J -B: Performed by: SURGERY

## 2018-03-08 PROCEDURE — 74011250636 HC RX REV CODE- 250/636: Performed by: NURSE ANESTHETIST, CERTIFIED REGISTERED

## 2018-03-08 PROCEDURE — 74011250636 HC RX REV CODE- 250/636

## 2018-03-08 PROCEDURE — 77030002933 HC SUT MCRYL J&J -A: Performed by: SURGERY

## 2018-03-08 PROCEDURE — 77030002986 HC SUT PROL J&J -A: Performed by: SURGERY

## 2018-03-08 PROCEDURE — 77030002996 HC SUT SLK J&J -A: Performed by: SURGERY

## 2018-03-08 PROCEDURE — 76060000034 HC ANESTHESIA 1.5 TO 2 HR: Performed by: SURGERY

## 2018-03-08 PROCEDURE — 74011250636 HC RX REV CODE- 250/636: Performed by: SURGERY

## 2018-03-08 PROCEDURE — 77030032490 HC SLV COMPR SCD KNE COVD -B: Performed by: SURGERY

## 2018-03-08 PROCEDURE — 74011250637 HC RX REV CODE- 250/637: Performed by: NURSE ANESTHETIST, CERTIFIED REGISTERED

## 2018-03-08 PROCEDURE — C1769 GUIDE WIRE: HCPCS | Performed by: SURGERY

## 2018-03-08 PROCEDURE — 71045 X-RAY EXAM CHEST 1 VIEW: CPT

## 2018-03-08 DEVICE — POWERPORT DUO M.R.I. IMPLANTABLE PORT WITH ATTACHABLE 9.5F CHRONOFLEX OPEN-ENDED DUAL-LUMEN VENOUS CATHETER. INTERMEDIATE KIT (WITH SUTURE PLUGS)
Type: IMPLANTABLE DEVICE | Site: CHEST | Status: NON-FUNCTIONAL
Brand: POWERPORT M.R.I., CHRONOFLEX
Removed: 2022-06-17

## 2018-03-08 RX ORDER — BUPIVACAINE HYDROCHLORIDE 2.5 MG/ML
INJECTION, SOLUTION EPIDURAL; INFILTRATION; INTRACAUDAL AS NEEDED
Status: DISCONTINUED | OUTPATIENT
Start: 2018-03-08 | End: 2018-03-08 | Stop reason: HOSPADM

## 2018-03-08 RX ORDER — FENTANYL CITRATE 50 UG/ML
INJECTION, SOLUTION INTRAMUSCULAR; INTRAVENOUS AS NEEDED
Status: DISCONTINUED | OUTPATIENT
Start: 2018-03-08 | End: 2018-03-08 | Stop reason: HOSPADM

## 2018-03-08 RX ORDER — HEPARIN SODIUM 200 [USP'U]/100ML
INJECTION, SOLUTION INTRAVENOUS
Status: DISCONTINUED | OUTPATIENT
Start: 2018-03-08 | End: 2018-03-08 | Stop reason: HOSPADM

## 2018-03-08 RX ORDER — HYDROCODONE BITARTRATE AND ACETAMINOPHEN 5; 325 MG/1; MG/1
1 TABLET ORAL
Status: DISCONTINUED | OUTPATIENT
Start: 2018-03-08 | End: 2018-03-08 | Stop reason: HOSPADM

## 2018-03-08 RX ORDER — FENTANYL CITRATE 50 UG/ML
25 INJECTION, SOLUTION INTRAMUSCULAR; INTRAVENOUS AS NEEDED
Status: CANCELLED | OUTPATIENT
Start: 2018-03-08

## 2018-03-08 RX ORDER — SODIUM CHLORIDE, SODIUM LACTATE, POTASSIUM CHLORIDE, CALCIUM CHLORIDE 600; 310; 30; 20 MG/100ML; MG/100ML; MG/100ML; MG/100ML
INJECTION, SOLUTION INTRAVENOUS
Status: DISCONTINUED | OUTPATIENT
Start: 2018-03-08 | End: 2018-03-08 | Stop reason: HOSPADM

## 2018-03-08 RX ORDER — SODIUM CHLORIDE, SODIUM LACTATE, POTASSIUM CHLORIDE, CALCIUM CHLORIDE 600; 310; 30; 20 MG/100ML; MG/100ML; MG/100ML; MG/100ML
100 INJECTION, SOLUTION INTRAVENOUS CONTINUOUS
Status: CANCELLED | OUTPATIENT
Start: 2018-03-08

## 2018-03-08 RX ORDER — CEFAZOLIN SODIUM 2 G/50ML
2 SOLUTION INTRAVENOUS ONCE
Status: DISCONTINUED | OUTPATIENT
Start: 2018-03-08 | End: 2018-03-08 | Stop reason: HOSPADM

## 2018-03-08 RX ORDER — DOCUSATE SODIUM 100 MG/1
100 CAPSULE, LIQUID FILLED ORAL 2 TIMES DAILY
Qty: 60 CAP | Refills: 2 | Status: SHIPPED | OUTPATIENT
Start: 2018-03-08 | End: 2018-06-06 | Stop reason: SDUPTHER

## 2018-03-08 RX ORDER — FAMOTIDINE 20 MG/1
20 TABLET, FILM COATED ORAL ONCE
Status: COMPLETED | OUTPATIENT
Start: 2018-03-08 | End: 2018-03-08

## 2018-03-08 RX ORDER — PROPOFOL 10 MG/ML
INJECTION, EMULSION INTRAVENOUS AS NEEDED
Status: DISCONTINUED | OUTPATIENT
Start: 2018-03-08 | End: 2018-03-08 | Stop reason: HOSPADM

## 2018-03-08 RX ORDER — SODIUM CHLORIDE 0.9 % (FLUSH) 0.9 %
5-10 SYRINGE (ML) INJECTION AS NEEDED
Status: CANCELLED | OUTPATIENT
Start: 2018-03-08

## 2018-03-08 RX ORDER — MIDAZOLAM HYDROCHLORIDE 1 MG/ML
INJECTION, SOLUTION INTRAMUSCULAR; INTRAVENOUS AS NEEDED
Status: DISCONTINUED | OUTPATIENT
Start: 2018-03-08 | End: 2018-03-08 | Stop reason: HOSPADM

## 2018-03-08 RX ORDER — SODIUM CHLORIDE 0.9 % (FLUSH) 0.9 %
5-10 SYRINGE (ML) INJECTION AS NEEDED
Status: DISCONTINUED | OUTPATIENT
Start: 2018-03-08 | End: 2018-03-08 | Stop reason: HOSPADM

## 2018-03-08 RX ORDER — SODIUM CHLORIDE, SODIUM LACTATE, POTASSIUM CHLORIDE, CALCIUM CHLORIDE 600; 310; 30; 20 MG/100ML; MG/100ML; MG/100ML; MG/100ML
75 INJECTION, SOLUTION INTRAVENOUS CONTINUOUS
Status: DISCONTINUED | OUTPATIENT
Start: 2018-03-08 | End: 2018-03-08 | Stop reason: HOSPADM

## 2018-03-08 RX ORDER — ONDANSETRON 2 MG/ML
INJECTION INTRAMUSCULAR; INTRAVENOUS AS NEEDED
Status: DISCONTINUED | OUTPATIENT
Start: 2018-03-08 | End: 2018-03-08 | Stop reason: HOSPADM

## 2018-03-08 RX ORDER — MAGNESIUM SULFATE 100 %
4 CRYSTALS MISCELLANEOUS AS NEEDED
Status: CANCELLED | OUTPATIENT
Start: 2018-03-08

## 2018-03-08 RX ORDER — SODIUM CHLORIDE 0.9 % (FLUSH) 0.9 %
5-10 SYRINGE (ML) INJECTION EVERY 8 HOURS
Status: DISCONTINUED | OUTPATIENT
Start: 2018-03-08 | End: 2018-03-08 | Stop reason: HOSPADM

## 2018-03-08 RX ORDER — HYDROCODONE BITARTRATE AND ACETAMINOPHEN 5; 325 MG/1; MG/1
1 TABLET ORAL
Qty: 30 TAB | Refills: 0 | Status: SHIPPED | OUTPATIENT
Start: 2018-03-08 | End: 2018-06-06 | Stop reason: SDUPTHER

## 2018-03-08 RX ORDER — DEXAMETHASONE SODIUM PHOSPHATE 4 MG/ML
INJECTION, SOLUTION INTRA-ARTICULAR; INTRALESIONAL; INTRAMUSCULAR; INTRAVENOUS; SOFT TISSUE AS NEEDED
Status: DISCONTINUED | OUTPATIENT
Start: 2018-03-08 | End: 2018-03-08 | Stop reason: HOSPADM

## 2018-03-08 RX ORDER — NALBUPHINE HYDROCHLORIDE 10 MG/ML
5 INJECTION, SOLUTION INTRAMUSCULAR; INTRAVENOUS; SUBCUTANEOUS
Status: CANCELLED | OUTPATIENT
Start: 2018-03-08

## 2018-03-08 RX ORDER — DEXTROSE 50 % IN WATER (D50W) INTRAVENOUS SYRINGE
25-50 AS NEEDED
Status: CANCELLED | OUTPATIENT
Start: 2018-03-08

## 2018-03-08 RX ADMIN — MIDAZOLAM HYDROCHLORIDE 2 MG: 1 INJECTION, SOLUTION INTRAMUSCULAR; INTRAVENOUS at 08:28

## 2018-03-08 RX ADMIN — SODIUM CHLORIDE, SODIUM LACTATE, POTASSIUM CHLORIDE, AND CALCIUM CHLORIDE 75 ML/HR: 600; 310; 30; 20 INJECTION, SOLUTION INTRAVENOUS at 07:30

## 2018-03-08 RX ADMIN — PROPOFOL 100 MG: 10 INJECTION, EMULSION INTRAVENOUS at 08:35

## 2018-03-08 RX ADMIN — FAMOTIDINE 20 MG: 20 TABLET, FILM COATED ORAL at 07:30

## 2018-03-08 RX ADMIN — FENTANYL CITRATE 100 MCG: 50 INJECTION, SOLUTION INTRAMUSCULAR; INTRAVENOUS at 08:35

## 2018-03-08 RX ADMIN — DEXAMETHASONE SODIUM PHOSPHATE 4 MG: 4 INJECTION, SOLUTION INTRA-ARTICULAR; INTRALESIONAL; INTRAMUSCULAR; INTRAVENOUS; SOFT TISSUE at 09:10

## 2018-03-08 RX ADMIN — ONDANSETRON 4 MG: 2 INJECTION INTRAMUSCULAR; INTRAVENOUS at 10:00

## 2018-03-08 RX ADMIN — SODIUM CHLORIDE, SODIUM LACTATE, POTASSIUM CHLORIDE, CALCIUM CHLORIDE: 600; 310; 30; 20 INJECTION, SOLUTION INTRAVENOUS at 08:16

## 2018-03-08 NOTE — INTERVAL H&P NOTE
H&P Update:  Braxton Lomax was seen and examined. History and physical has been reviewed. The patient has been examined.  There have been no significant clinical changes since the completion of the originally dated History and Physical.    Signed By: Arabella Davis MD     March 8, 2018 8:07 AM

## 2018-03-08 NOTE — PERIOP NOTES
[facility] patient discharge packet placed on chart for patient to recidive postop

## 2018-03-08 NOTE — OP NOTES
[address] [place] [address] [address]                                 OPERATIVE REPORT    PATIENT:     Rashid Martinez  MRN:            [id number]      DATE:   3/8/2018    BILLIN  ROOM:       OR/PL  ATTENDING:   Navya Greenberg MD  DICTATING:   Navya Greenberg MD      PREOPERATIVE DIAGNOSIS: Right breast cancer    POSTOPERATIVE DIAGNOSIS: Same    PROCEDURES PERFORMED: Left cephalic vein MediPort placement. ESTIMATED BLOOD LOSS: 5 mL. SPECIMENS REMOVED: none    SURGEON: Kishan Soni MD    SURGICAL ASSIST: Matthew Fung    ANESTHESIA: MAC and local (0.25% Marcaine plain). FINDINGS: Both ports aspirated and flushed with ease. The tip of the  catheter was at the SVC-right atrial junction. FLUIDS GIVEN:650 mL. INDICATION: all ports aspirate and flush with ease    DESCRIPTION OF PROCEDURE: The patient was identified in the holding area  with his wife, where consent for left cephalic vein MediPort placement was  verified. In the operating room, the patient was placed under general  anesthesia with the left arm tucked. The chest wall was prepped and draped  in sterile fashion using chlorhexidine solution and sterile drapes. Time-out was  performed to insure correct procedure. The local anesthetic was infiltrated  into the skin and deep dermal tissues in the deltopectoral groove. The 15  blade was used to cut through the skin into the subcutaneous tissue. Electrocautery was used to dissect deeper into the subcutaneous tissue. Pedro Rash was placed for retraction. The cephalic vein was  Identified in the deltopectoral groove. The cephalic  vein was isolated and 3.0 silk suture was placed proximally  and distally. A 15 blade was used to create a venotomy. The dual lumen Power port catheter was inserted into the cephalic vein but it would not pass into the subclavian. A 0.038 and 0.035 glidewire were used under flouroscopic guidance which revealed a looping of the vessel. The vein was ligated with the 3.0 silk suture proximally and distally. Venipuncture of the subclavian vein was performed. Seldinger technique was used under fluouroscopic guidance to place tip of the catheter at the RA/SCV junction. The catheter was cut at 20 cm. The port was attached to the catheter. The catheter aspirated and flushed with ease. A pocket for the port was created using blunt dissection and electrocautery. The catheter was secured in place using 3 interrupted stitches of 2-0 Prolene. The 3-0 Vicryl was used to reapproximate the deep dermal tissues, 4-0 Monocryl was used to close the skin. Dermabond was used as a wound sealant. The patient tolerated the procedure very well. DISPOSITION: He was stable upon transport to the recovery  room.

## 2018-03-08 NOTE — ANESTHESIA PREPROCEDURE EVALUATION
Anesthetic History   No history of anesthetic complications            Review of Systems / Medical History  Patient summary reviewed and pertinent labs reviewed    Pulmonary  Within defined limits                 Neuro/Psych   Within defined limits           Cardiovascular                  Exercise tolerance: <4 METS  Comments: Normal EF  Neg ischemia on stress echo   GI/Hepatic/Renal     GERD: well controlled  Hepatitis: type B         Endo/Other        Cancer    Comments: Breast cancer Other Findings   Comments: Documentation of current medication  Current medications obtained, documented and obtained? YES      Risk Factors for Postoperative nausea/vomiting:       History of postoperative nausea/vomiting? NO       Female? YES       Motion sickness? NO       Intended opioid administration for postoperative analgesia? NO      Smoking Abstinence:  Current Smoker? NO  Elective Surgery? YES  Seen preoperatively by anesthesiologist or proxy prior to day of surgery? YES  Pt abstained from smoking 24 hours prior to anesthesia?  YES    Preventive care/screening for High Blood Pressure:  Aged 18 years and older: YES  Screened for high blood pressure: YES  Patients with high blood pressure referred to primary care provider   for BP management: YES                 Physical Exam    Airway  Mallampati: III  TM Distance: 4 - 6 cm  Neck ROM: normal range of motion   Mouth opening: Normal     Cardiovascular  Regular rate and rhythm,  S1 and S2 normal,  no murmur, click, rub, or gallop             Dental  No notable dental hx       Pulmonary  Breath sounds clear to auscultation               Abdominal  GI exam deferred       Other Findings            Anesthetic Plan    ASA: 3  Anesthesia type: general          Induction: Intravenous  Anesthetic plan and risks discussed with: Patient

## 2018-03-08 NOTE — ANESTHESIA POSTPROCEDURE EVALUATION
Post-Anesthesia Evaluation and Assessment    Patient: Alberto Freeman MRN: [id number]  SSN: [id number]    YOB: 1960  Age: 62 y.o. Sex: female       Cardiovascular Function/Vital Signs  Visit Vitals    /73    Pulse 85    Temp 37.2 °C (99 °F)    Resp 16    Ht 5' 5\" (1.651 m)    Wt 51.7 kg (114 lb)    SpO2 100%    BMI 18.97 kg/m2       Patient is status post general anesthesia for Procedure(s):  LEFT CEPHALIC VEIN MEDIPORT/C-ARM. Nausea/Vomiting: None    Postoperative hydration reviewed and adequate. Pain:  Pain Scale 1: Numeric (0 - 10) (03/08/18 1013)  Pain Intensity 1: 0 (03/08/18 1013)   Managed    Neurological Status:   Neuro (WDL): Within Defined Limits (03/08/18 1013)   At baseline    Mental Status and Level of Consciousness: Arousable    Pulmonary Status:   O2 Device: Room air (03/08/18 1013)   Adequate oxygenation and airway patent    Complications related to anesthesia: None    Post-anesthesia assessment completed.  No concerns      Signed By: Denis Carvajal MD     March 8, 2018

## 2018-03-08 NOTE — H&P (VIEW-ONLY)
[facility]  General Surgery    Subjective:      HPI:  Pt is a very pleasant 27-year-old female with a past medical history that is remarkable for hepatitis B and tuberculosis. She was recently diagnosed with right breast cancer ER [place] negative HER-2 positive of the upper outer quadrant. She needs a MediPort for neoadjuvant chemotherapy. Patient Active Problem List    Diagnosis Date Noted    Malignant neoplasm of upper-outer quadrant of right breast in female, estrogen receptor negative ([facility] [address] 02/14/2018    Viral hepatitis B with hepatic coma 02/14/2018    GERD (gastroesophageal reflux disease) 09/05/2014     Past Medical History:   Diagnosis Date    Hepatitis B     Tuberculosis 1980      Past Surgical History:   Procedure Laterality Date    HX GYN      hysterectomy     HX OTHER SURGICAL      neck surgery       Family History   Problem Relation Age of Onset    Stroke Mother     Heart Disease Father     Cancer Maternal Grandmother       Social History   Substance Use Topics    Smoking status: Never Smoker    Smokeless tobacco: Current User    Alcohol use 1.2 oz/week     2 Cans of beer per week      Comment: weekly       No Known Allergies    Prior to Admission medications    Medication Sig Start Date End Date Taking? Authorizing Provider   cephALEXin (KEFLEX) 250 mg capsule Take 250 mg by mouth two (2) times a day. Yes Historical Provider   famotidine (PEPCID) 40 mg tablet 40 mg. Historical Provider   sucralfate (CARAFATE) 1 gram tablet 1 g. Historical Provider       Review of Systems:    14 systems were reviewed. The results are as above in the HPI and otherwise negative. Objective:     Vitals:    03/06/18 1306   BP: 102/64   Pulse: 77   Resp: 16   Temp: 98.2 °F (36.8 °C)   Weight: 51.7 kg (114 lb)       Physical Exam:  GENERAL: alert, cooperative, no distress, appears stated age,   EYE: conjunctivae/corneas clear. PERRL, EOM's intact.   THROAT & NECK: normal and no erythema or exudates noted. ,    LYMPHATIC: Cervical, supraclavicular, and axillary nodes normal. ,   LUNG: clear to auscultation bilaterally,   HEART: regular rate and rhythm, S1, S2 normal, no murmur, click, rub or gallop,   ABDOMEN: soft, non-tender. Bowel sounds normal. No masses,  no organomegaly,   EXTREMITIES:  extremities normal, atraumatic, no cyanosis or edema,   SKIN: Normal.,   NEUROLOGIC: AOx3. Cranial nerves 2-12 and sensation grossly intact. ,     Data Review:  to be done    Ms. Izella Scheuermann has a reminder for a \"due or due soon\" health maintenance. I have asked that she contact her primary care provider for follow-up on this health maintenance.   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    Plan:     · Left cephalic vein MediPort placement  · Consent on chart  · Preoperative orders written    Signed By: Cullen Ohara MD     March 6, 2018

## 2018-03-08 NOTE — IP AVS SNAPSHOT
[address] [facility] 
 
 
 [address] Patient: De Camilo MRN: [id number] IQW:[id number] About your hospitalization You were admitted on:  March 8, 2018 You last received care in the:  [facility] PH[facility] 2 RECOVERY You were discharged on:  March 8, 2018 Why you were hospitalized Your primary diagnosis was:  Not on File Your diagnoses also included:  Breast Cancer, Right (Hcc) Follow-up Information Follow up With Details Comments Contact Info Johnson Hernandez MD   [address] [facility] [address] 
[phone] Hyun Dave MD Follow up in 2 week(s)  [place] [address] [address] [facility] [address] 
[phone] Your Scheduled Appointments Tuesday March 13, 2018  2:00 PM EDT  
POST OP with MOSES Rossi [address] [facility] [address] [place] [address] [address] [facility] [address]  
[phone] Monday March 19, 2018  9:00 AM EDT Office Visit with Hossein Zavala MD  
[address] [facility]) [place] [address] [address] [facility] [address]  
[phone] Discharge Orders None A check belle indicates which time of day the medication should be taken. My Medications START taking these medications Instructions Each Dose to Equal  
 Morning Noon Evening Bedtime  
 docusate sodium 100 mg capsule Commonly known as:  Jordy Womack Your last dose was: Your next dose is: Take 1 Cap by mouth two (2) times a day for 90 days. 100 mg HYDROcodone-acetaminophen 5-325 mg per tablet Commonly known as:  Heraclio Parker Your last dose was: Your next dose is: Take 1 Tab by mouth every four (4) hours as needed for Pain. Max Daily Amount: 6 Tabs. 1 Tab CONTINUE taking these medications Instructions Each Dose to Equal  
 Morning Noon Evening Bedtime  
 famotidine 40 mg tablet Commonly known as:  PEPCID Your last dose was: Your next dose is:    
   
   
 40 mg.  
 40 mg KEFLEX 250 mg capsule Generic drug:  cephALEXin Your last dose was: Your next dose is: Take 250 mg by mouth two (2) times a day. 250 mg  
    
   
   
   
  
 sucralfate 1 gram tablet Commonly known as:  Glorine Held Your last dose was: Your next dose is:    
   
   
 1 g.  
 1 g Where to Get Your Medications Information on where to get these meds will be given to you by the nurse or doctor. ! Ask your nurse or doctor about these medications  
  docusate sodium 100 mg capsule HYDROcodone-acetaminophen 5-325 mg per tablet Discharge Instructions [facility] Anna Stanley MD, FACS General Surgery Pt may shower. Allow soap and water to run over the incision. No driving or operating heavy machinery while on narcotic pain medications. No strenuous activity or contact sports for two weeks. No lifting greater than 15 lbs for 2 weeks. Call MD for any redness, swelling, bleeding or pus at the incision. Also call for any nausea, vomiting, increased pain or pain uncontrolled by pain medicine. Breast Cancer: Care Instructions Your Care Instructions Breast cancer occurs when abnormal cells grow out of control in the breast. These cancer cells can spread within the breast, to nearby lymph nodes and other tissues, and to other parts of the body. Being treated for cancer can weaken your body, and you may feel very tired. Get the rest your body needs so you can feel better. Finding out that you have cancer is scary.  You may feel many emotions and may need some help coping. Seek out family, friends, and counselors for support. You also can do things at home to make yourself feel better while you go through treatment. Call the Suzie Wilson ([phone]) or visit its website at [address]finch. NetManage for more information. Follow-up care is a key part of your treatment and safety. Be sure to make and go to all appointments, and call your doctor if you are having problems. It's also a good idea to know your test results and keep a list of the medicines you take. How can you care for yourself at home? · Take your medicines exactly as prescribed. Call your doctor if you think you are having a problem with your medicine. You may get medicine for nausea and vomiting if you have these side effects. · Follow your doctor's instructions to relieve pain. Pain from cancer and surgery can almost always be controlled. Use pain medicine when you first notice pain, before it becomes severe. · Eat healthy food. If you do not feel like eating, try to eat food that has protein and extra calories to keep up your strength and prevent weight loss. Drink liquid meal replacements for extra calories and protein. Try to eat your main meal early. · Get some physical activity every day, but do not get too tired. Keep doing the hobbies you enjoy as your energy allows. · Do not smoke. Smoking can make your cancer worse. If you need help quitting, talk to your doctor about stop-smoking programs and medicines. These can increase your chances of quitting for good. · Take steps to control your stress and workload. Learn relaxation techniques. ¨ Share your feelings. Stress and tension affect our emotions. By expressing your feelings to others, you may be able to understand and cope with them. ¨ Consider joining a support group. Talking about a problem with your spouse, a good friend, or other people with similar problems is a good way to reduce tension and stress. ¨ Express yourself through art. Try writing, crafts, dance, or art to relieve stress. Some dance, writing, or art groups may be available just for people who have cancer. ¨ Be kind to your body and mind. Getting enough sleep, eating a healthy diet, and taking time to do things you enjoy can contribute to an overall feeling of balance in your life and can help reduce stress. ¨ Get help if you need it. Discuss your concerns with your doctor or counselor. · If you are vomiting or have diarrhea: ¨ Drink plenty of fluids (enough so that your urine is light yellow or clear like water) to prevent dehydration. Choose water and other caffeine-free clear liquids. If you have kidney, heart, or liver disease and have to limit fluids, talk with your doctor before you increase the amount of fluids you drink. ¨ When you are able to eat, try clear soups, mild foods, and liquids until all symptoms are gone for 12 to 48 hours. Other good choices include dry toast, crackers, cooked cereal, and gelatin dessert, such as Jell-O. · If you have not already done so, prepare a list of advance directives. Advance directives are instructions to your doctor and family members about what kind of care you want if you become unable to speak or express yourself. When should you call for help? Call 911 anytime you think you may need emergency care. For example, call if: 
? · You passed out (lost consciousness). ?Call your doctor now or seek immediate medical care if: 
? · You have a fever. ? · You have abnormal bleeding. ? · You think you have an infection. ? · You have new or worse pain. ? · You have new symptoms, such as a cough, belly pain, vomiting, diarrhea, or a rash. ? Watch closely for changes in your health, and be sure to contact your doctor if: 
? · You are much more tired than usual.  
? · You have swollen glands in your armpits, groin, or neck. ? · You do not get better as expected. Where can you learn more? Go to http://catalino-tiesha.info/. Enter V321 in the search box to learn more about \"Breast Cancer: Care Instructions. \" Current as of: May 12, 2017 Content Version: 11.4 © 3745-4697 Athigo. Care instructions adapted under license by [employer] (which disclaims liability or warranty for this information). If you have questions about a medical condition or this instruction, always ask your healthcare professional. [place] [address] any warranty or liability for your use of this information. DISCHARGE SUMMARY from Nurse PATIENT INSTRUCTIONS: 
 
 
F-face looks uneven A-arms unable to move or move unevenly S-speech slurred or non-existent T-time-call 911 as soon as signs and symptoms begin-DO NOT go Back to bed or wait to see if you get better-TIME IS BRAIN. Warning Signs of HEART ATTACK Call 911 if you have these symptoms: 
? Chest discomfort. Most heart attacks involve discomfort in the center of the chest that lasts more than a few minutes, or that goes away and comes back. It can feel like uncomfortable pressure, squeezing, fullness, or pain. ? Discomfort in other areas of the upper body. Symptoms can include pain or discomfort in one or both arms, the back, neck, jaw, or stomach. ? Shortness of breath with or without chest discomfort. ? Other signs may include breaking out in a cold sweat, nausea, or lightheadedness. Don't wait more than five minutes to call [address]! Fast action can save your life. Calling 911 is almost always the fastest way to get lifesaving treatment. Emergency Medical Services staff can begin treatment when they arrive  up to an hour sooner than if someone gets to the hospital by car. The discharge information has been reviewed with the patient. The patient verbalized understanding.  
Discharge medications reviewed with the patient and appropriate educational materials and side effects teaching were provided. ___________________________________________________________________________________________________________________________________ MyChart Announcement We are excited to announce that we are making your provider's discharge notes available to you in ProfitPoint. You will see these notes when they are completed and signed by the physician that discharged you from your recent hospital stay. If you have any questions or concerns about any information you see in [email or web address], please call the Health Information Department where you were seen or reach out to your Primary Care Provider for more information about your plan of care. Introducing [facility]! Nahum Stack introduces ProfitPoint patient portal. Now you can access parts of your medical record, email your doctor's office, and request medication refills online. 1. In your internet browser, go to [email or web address] 2. Click on the First Time User? Click Here link in the Sign In box. You will see the New Member Sign Up page. 3. Enter your Jia.comt Access Code exactly as it appears below. You will not need to use this code after youve completed the sign-up process. If you do not sign up before the expiration date, you must request a new code. · ProfitPoint Access Code: [email or web address] Expires: 5/14/2018 10:01 AM 
 
4. Enter the last four digits of your Social Security Number (xxxx) and Date of Birth (mm/dd/yyyy) as indicated and click Submit. You will be taken to the next sign-up page. 5. Create a Jia.comt ID. This will be your ProfitPoint login ID and cannot be changed, so think of one that is secure and easy to remember. 6. Create a ProfitPoint password. You can change your password at any time. 7. Enter your Password Reset Question and Answer. This can be used at a later time if you forget your password. 8. Enter your e-mail address. You will receive e-mail notification when new information is available in [address] 9. Click Sign Up. You can now view and download portions of your medical record. 10. Click the Download Summary menu link to download a portable copy of your medical information. If you have questions, please visit the Frequently Asked Questions section of the MyChart website. Remember, ison furnituret is NOT to be used for urgent needs. For medical emergencies, dial 911. Now available from your iPhone and Android! Unresulted Labs-Please follow up with your PCP about these lab tests Order Current Status NC XR TECHNOLOGIST SERVICE In process Providers Seen During Your Hospitalization Provider Specialty Primary office phone Mable Santos, [address] [place] [address] Surgery [phone] Your Primary Care Physician (PCP) Primary Care Physician Office Phone Office Fax Kareen Arleen [phone] You are allergic to the following No active allergies Recent Documentation Height Weight BMI OB Status Smoking Status 1.651 m 51.7 kg 18.97 kg/m2 Hysterectomy Never Smoker Emergency Contacts Name Discharge Info Relation Home Work Mobile Dorina Nur ([address] [facility]) DISCHARGE CAREGIVER [3] Other Relative [6] [phone] Liudmila Cam,  Other Relative [6] [phone] Patient Belongings The following personal items are in your possession at time of discharge: 
  Dental Appliances: None         Home Medications: None   Jewelry: None  Clothing: Footwear, Pants, Shirt (no undergarment)    Other Valuables: None Please provide this summary of care documentation to your next provider. Signatures-by signing, you are acknowledging that this After Visit Summary has been reviewed with you and you have received a copy.   
  
 
  
    
    
 Patient Signature: ____________________________________________________________ Date:  ____________________________________________________________  
  
Willim Roch Provider Signature:  ____________________________________________________________ Date:  ____________________________________________________________

## 2018-03-08 NOTE — PROGRESS NOTES
conducted a pre-op visit with De Camilo, who is a 62 y.o.,female. The  provided the following Interventions:  Initiated a relationship of care and support. Plan:  Chaplains will continue to follow and will provide pastoral care on an as needed/requested basis.  recommends bedside caregivers page  on duty if patient shows signs of acute spiritual or emotional distress.     [address] [facility] Certified [address] [place] [address]   [phone]

## 2018-03-08 NOTE — DISCHARGE SUMMARY
4 Orquidea Zapien MD, [facility]  General Surgery  Discharge Summary     Patient ID:  Bishop Champion  [id number]  1 y.o.  1960    Admit Date: 3/8/2018    Discharge Date: 3/8/2018    Admission Diagnoses: STAGE I BREAST CANCER ER- RIGHT; C50.911 Z17. 1; Breast cance*    Discharge Diagnoses:    Problem List as of 3/8/2018  Date Reviewed: 3/6/2018          Codes Class Noted - Resolved    Breast cancer, right [facility]) ICD-10-CM: C50.911  ICD-9-CM: 174.9  3/8/2018 - Present        Malignant neoplasm of upper-outer quadrant of right breast in female, estrogen receptor negative ([facility] [address] ICD-10-CM: C50.411, Z17.1  ICD-9-CM: 174.4, V86.1  2/14/2018 - Present        Viral hepatitis B with hepatic coma ICD-10-CM: B19.11  ICD-9-CM: 070.20  2/14/2018 - Present        GERD (gastroesophageal reflux disease) ICD-10-CM: K21.9  ICD-9-CM: 530.81  9/5/2014 - Present               Admission Condition: Good    Discharge Condition: Good    Last Procedure: Procedure(s):  LEFT CEPHALIC VEIN MEDIPORT/C-ARM    Hospital Course:   Normal hospital course for this procedure. Consults: None    Significant Diagnostic Studies: Fluoroscopy and chest xray    Disposition: home    Patient Instructions:   Current Discharge Medication List      START taking these medications    Details   HYDROcodone-acetaminophen (NORCO) 5-325 mg per tablet Take 1 Tab by mouth every four (4) hours as needed for Pain. Max Daily Amount: 6 Tabs. Qty: 30 Tab, Refills: 0    Associated Diagnoses: Postoperative pain      docusate sodium (COLACE) 100 mg capsule Take 1 Cap by mouth two (2) times a day for 90 days. Qty: 60 Cap, Refills: 2    Associated Diagnoses: Postoperative pain         CONTINUE these medications which have NOT CHANGED    Details   cephALEXin (KEFLEX) 250 mg capsule Take 250 mg by mouth two (2) times a day. famotidine (PEPCID) 40 mg tablet 40 mg.      sucralfate (CARAFATE) 1 gram tablet 1 g.            Activity: See surgical instructions  Diet: Low fat, Low cholesterol  Wound Care: As directed    Follow-up with Dr. Lois Bryant in 2 weeks.   Follow-up tests/labs None    Signed:  Cesar Mohan MD  3/8/2018  10:01 AM

## 2018-03-08 NOTE — PERIOP NOTES
I have reviewed discharge instructions with the patient and daughter. The patient and daughter verbalized understanding. Patient armband removed and shredded. Mediport packet given to pt/family.

## 2018-03-08 NOTE — DISCHARGE INSTRUCTIONS
[facility]  Cesar Mohan MD, FACS  General Surgery    Pt may shower. Allow soap and water to run over the incision. No driving or operating heavy machinery while on narcotic pain medications. No strenuous activity or contact sports for two weeks. No lifting greater than 15 lbs for 2 weeks. Call MD for any redness, swelling, bleeding or pus at the incision. Also call for any nausea, vomiting, increased pain or pain uncontrolled by pain medicine. Breast Cancer: Care Instructions  Your Care Instructions    Breast cancer occurs when abnormal cells grow out of control in the breast. These cancer cells can spread within the breast, to nearby lymph nodes and other tissues, and to other parts of the body. Being treated for cancer can weaken your body, and you may feel very tired. Get the rest your body needs so you can feel better. Finding out that you have cancer is scary. You may feel many emotions and may need some help coping. Seek out family, friends, and counselors for support. You also can do things at home to make yourself feel better while you go through treatment. Call the [employer] (4-390.527.3707) or visit its website at [address] for more information. Follow-up care is a key part of your treatment and safety. Be sure to make and go to all appointments, and call your doctor if you are having problems. It's also a good idea to know your test results and keep a list of the medicines you take. How can you care for yourself at home? · Take your medicines exactly as prescribed. Call your doctor if you think you are having a problem with your medicine. You may get medicine for nausea and vomiting if you have these side effects. · Follow your doctor's instructions to relieve pain. Pain from cancer and surgery can almost always be controlled. Use pain medicine when you first notice pain, before it becomes severe. · Eat healthy food.  If you do not feel like eating, try to eat food that has protein and extra calories to keep up your strength and prevent weight loss. Drink liquid meal replacements for extra calories and protein. Try to eat your main meal early. · Get some physical activity every day, but do not get too tired. Keep doing the hobbies you enjoy as your energy allows. · Do not smoke. Smoking can make your cancer worse. If you need help quitting, talk to your doctor about stop-smoking programs and medicines. These can increase your chances of quitting for good. · Take steps to control your stress and workload. Learn relaxation techniques. ¨ Share your feelings. Stress and tension affect our emotions. By expressing your feelings to others, you may be able to understand and cope with them. ¨ Consider joining a support group. Talking about a problem with your spouse, a good friend, or other people with similar problems is a good way to reduce tension and stress. ¨ Express yourself through art. Try writing, crafts, dance, or art to relieve stress. Some dance, writing, or art groups may be available just for people who have cancer. ¨ Be kind to your body and mind. Getting enough sleep, eating a healthy diet, and taking time to do things you enjoy can contribute to an overall feeling of balance in your life and can help reduce stress. ¨ Get help if you need it. Discuss your concerns with your doctor or counselor. · If you are vomiting or have diarrhea:  ¨ Drink plenty of fluids (enough so that your urine is light yellow or clear like water) to prevent dehydration. Choose water and other caffeine-free clear liquids. If you have kidney, heart, or liver disease and have to limit fluids, talk with your doctor before you increase the amount of fluids you drink. ¨ When you are able to eat, try clear soups, mild foods, and liquids until all symptoms are gone for 12 to 48 hours.  Other good choices include dry toast, crackers, cooked cereal, and gelatin dessert, such as Jell-O.  · If you have not already done so, prepare a list of advance directives. Advance directives are instructions to your doctor and family members about what kind of care you want if you become unable to speak or express yourself. When should you call for help? Call 911 anytime you think you may need emergency care. For example, call if:  ? · You passed out (lost consciousness). ?Call your doctor now or seek immediate medical care if:  ? · You have a fever. ? · You have abnormal bleeding. ? · You think you have an infection. ? · You have new or worse pain. ? · You have new symptoms, such as a cough, belly pain, vomiting, diarrhea, or a rash. ? Watch closely for changes in your health, and be sure to contact your doctor if:  ? · You are much more tired than usual.   ? · You have swollen glands in your armpits, groin, or neck. ? · You do not get better as expected. Where can you learn more? Go to http://catalino-tiesha.info/. Enter V321 in the search box to learn more about \"Breast Cancer: Care Instructions. \"  Current as of: May 12, 2017  Content Version: 11.4  © 7584-9381 [employer]. Care instructions adapted under license by [employer] (which disclaims liability or warranty for this information). If you have questions about a medical condition or this instruction, always ask your healthcare professional. [place] [address] any warranty or liability for your use of this information. DISCHARGE SUMMARY from Nurse    PATIENT INSTRUCTIONS:    After general anesthesia or intravenous sedation, for 24 hours or while taking prescription Narcotics:  · Limit your activities  · Do not drive and operate hazardous machinery  · Do not make important personal or business decisions  · Do  not drink alcoholic beverages  · If you have not urinated within 8 hours after discharge, please contact your surgeon on call.     Report the following to your surgeon:  · Excessive pain, swelling, redness or odor of or around the surgical area  · Temperature over 100.5  · Nausea and vomiting lasting longer than 4 hours or if unable to take medications  · Any signs of decreased circulation or nerve impairment to extremity: change in color, persistent  numbness, tingling, coldness or increase pain  · Any questions    *  Please give a list of your current medications to your Primary Care Provider. *  Please update this list whenever your medications are discontinued, doses are      changed, or new medications (including over-the-counter products) are added. *  Please carry medication information at all times in case of emergency situations. These are general instructions for a healthy lifestyle:    No smoking/ No tobacco products/ Avoid exposure to second hand smoke  Surgeon General's Warning:  Quitting smoking now greatly reduces serious risk to your health. Obesity, smoking, and sedentary lifestyle greatly increases your risk for illness    A healthy diet, regular physical exercise & weight monitoring are important for maintaining a healthy lifestyle    You may be retaining fluid if you have a history of heart failure or if you experience any of the following symptoms:  Weight gain of 3 pounds or more overnight or 5 pounds in a week, increased swelling in our hands or feet or shortness of breath while lying flat in bed. Please call your doctor as soon as you notice any of these symptoms; do not wait until your next office visit. Recognize signs and symptoms of STROKE:    F-face looks uneven    A-arms unable to move or move unevenly    S-speech slurred or non-existent    T-time-call 911 as soon as signs and symptoms begin-DO NOT go       Back to bed or wait to see if you get better-TIME IS BRAIN. Warning Signs of HEART ATTACK     Call 911 if you have these symptoms:   Chest discomfort.  Most heart attacks involve discomfort in the center of the chest that lasts more than a few minutes, or that goes away and comes back. It can feel like uncomfortable pressure, squeezing, fullness, or pain.  Discomfort in other areas of the upper body. Symptoms can include pain or discomfort in one or both arms, the back, neck, jaw, or stomach.  Shortness of breath with or without chest discomfort.  Other signs may include breaking out in a cold sweat, nausea, or lightheadedness. Don't wait more than five minutes to call 911 - MINUTES MATTER! Fast action can save your life. Calling 911 is almost always the fastest way to get lifesaving treatment. Emergency Medical Services staff can begin treatment when they arrive -- up to an hour sooner than if someone gets to the hospital by car. The discharge information has been reviewed with the patient. The patient verbalized understanding. Discharge medications reviewed with the patient and appropriate educational materials and side effects teaching were provided.   ___________________________________________________________________________________________________________________________________

## 2018-03-13 ENCOUNTER — OFFICE VISIT (OUTPATIENT)
Dept: SURGERY | Age: 58
End: 2018-03-13

## 2018-03-13 VITALS
RESPIRATION RATE: 18 BRPM | WEIGHT: 114 LBS | DIASTOLIC BLOOD PRESSURE: 82 MMHG | HEART RATE: 77 BPM | SYSTOLIC BLOOD PRESSURE: 138 MMHG | TEMPERATURE: 98.7 F | BODY MASS INDEX: 18.97 KG/M2

## 2018-03-13 DIAGNOSIS — C50.911 STAGE 1 BREAST CANCER, ER-, RIGHT (HCC): ICD-10-CM

## 2018-03-13 DIAGNOSIS — Z09 POSTOPERATIVE EXAMINATION: Primary | ICD-10-CM

## 2018-03-13 DIAGNOSIS — Z17.1 STAGE 1 BREAST CANCER, ER-, RIGHT (HCC): ICD-10-CM

## 2018-03-13 NOTE — PATIENT INSTRUCTIONS
Breast Cancer: Care Instructions  Your Care Instructions    Breast cancer occurs when abnormal cells grow out of control in the breast. These cancer cells can spread within the breast, to nearby lymph nodes and other tissues, and to other parts of the body. Being treated for cancer can weaken your body, and you may feel very tired. Get the rest your body needs so you can feel better. Finding out that you have cancer is scary. You may feel many emotions and may need some help coping. Seek out family, friends, and counselors for support. You also can do things at home to make yourself feel better while you go through treatment. Call the [employer] (9-344.865.7876) or visit its website at [employer] for more information. Follow-up care is a key part of your treatment and safety. Be sure to make and go to all appointments, and call your doctor if you are having problems. It's also a good idea to know your test results and keep a list of the medicines you take. How can you care for yourself at home? · Take your medicines exactly as prescribed. Call your doctor if you think you are having a problem with your medicine. You may get medicine for nausea and vomiting if you have these side effects. · Follow your doctor's instructions to relieve pain. Pain from cancer and surgery can almost always be controlled. Use pain medicine when you first notice pain, before it becomes severe. · Eat healthy food. If you do not feel like eating, try to eat food that has protein and extra calories to keep up your strength and prevent weight loss. Drink liquid meal replacements for extra calories and protein. Try to eat your main meal early. · Get some physical activity every day, but do not get too tired. Keep doing the hobbies you enjoy as your energy allows. · Do not smoke. Smoking can make your cancer worse. If you need help quitting, talk to your doctor about stop-smoking programs and medicines.  These can increase your chances of quitting for good. · Take steps to control your stress and workload. Learn relaxation techniques. ¨ Share your feelings. Stress and tension affect our emotions. By expressing your feelings to others, you may be able to understand and cope with them. ¨ Consider joining a support group. Talking about a problem with your spouse, a good friend, or other people with similar problems is a good way to reduce tension and stress. ¨ Express yourself through art. Try writing, crafts, dance, or art to relieve stress. Some dance, writing, or art groups may be available just for people who have cancer. ¨ Be kind to your body and mind. Getting enough sleep, eating a healthy diet, and taking time to do things you enjoy can contribute to an overall feeling of balance in your life and can help reduce stress. ¨ Get help if you need it. Discuss your concerns with your doctor or counselor. · If you are vomiting or have diarrhea:  ¨ Drink plenty of fluids (enough so that your urine is light yellow or clear like water) to prevent dehydration. Choose water and other caffeine-free clear liquids. If you have kidney, heart, or liver disease and have to limit fluids, talk with your doctor before you increase the amount of fluids you drink. ¨ When you are able to eat, try clear soups, mild foods, and liquids until all symptoms are gone for 12 to 48 hours. Other good choices include dry toast, crackers, cooked cereal, and gelatin dessert, such as Jell-O.  · If you have not already done so, prepare a list of advance directives. Advance directives are instructions to your doctor and family members about what kind of care you want if you become unable to speak or express yourself. When should you call for help? Call 911 anytime you think you may need emergency care. For example, call if:  ? · You passed out (lost consciousness). ?Call your doctor now or seek immediate medical care if:  ? · You have a fever. ? · You have abnormal bleeding. ? · You think you have an infection. ? · You have new or worse pain. ? · You have new symptoms, such as a cough, belly pain, vomiting, diarrhea, or a rash. ? Watch closely for changes in your health, and be sure to contact your doctor if:  ? · You are much more tired than usual.   ? · You have swollen glands in your armpits, groin, or neck. ? · You do not get better as expected. Where can you learn more? Go to http://catalino-tiesha.info/. Enter V321 in the search box to learn more about \"Breast Cancer: Care Instructions. \"  Current as of: May 12, 2017  Content Version: 11.4  © 4081-6110 [employer]. Care instructions adapted under license by [employer] (which disclaims liability or warranty for this information). If you have questions about a medical condition or this instruction, always ask your healthcare professional. [address] any warranty or liability for your use of this information.

## 2018-03-13 NOTE — MR AVS SNAPSHOT
1017 [facility] 240 200 [facility] 
[phone] Patient: Denae Gregory MRN: [id number] CQW:[birth year] Visit Information Date & Time Provider Department Dept. Phone Encounter #  
 3/13/2018  2:00 PM [facility] [phone] [id number] Your Appointments 3/19/2018  9:00 AM  
Office Visit with Jonathan Valle MD  
[address] [facility]  [facility] Appt Note: 2 WK RET  
 5445 [facility] [address], [address] [place] [address] Upcoming Health Maintenance Date Due Pneumococcal 19-64 Highest Risk (1 of 3 - PCV13) 9/5/1979 DTaP/Tdap/Td series (1 - Tdap) 9/5/1981 PAP AKA CERVICAL CYTOLOGY 9/5/1981 BREAST CANCER SCRN MAMMOGRAM 9/5/2010 FOBT Q 1 YEAR AGE 50-75 9/5/2010 Influenza Age 5 to Adult 8/1/2017 Allergies as of 3/13/2018  Review Complete On: 3/13/2018 By: Tanya Bruno LPN No Known Allergies Current Immunizations  Never Reviewed No immunizations on file. Not reviewed this visit Vitals BP Pulse Temp Resp Weight(growth percentile) BMI  
 138/82 77 98.7 °F (37.1 °C) 18 114 lb (51.7 kg) 18.97 kg/m2 OB Status Smoking Status Hysterectomy Never Smoker Vitals History BMI and BSA Data Body Mass Index Body Surface Area  
 18.97 kg/m 2 1.54 m 2 Your Updated Medication List  
  
   
This list is accurate as of 3/13/18  2:55 PM.  Always use your most recent med list.  
  
  
  
  
 docusate sodium 100 mg capsule Commonly known as:  Enrique Allan Take 1 Cap by mouth two (2) times a day for 90 days. famotidine 40 mg tablet Commonly known as:  PEPCID  
40 mg. HYDROcodone-acetaminophen 5-325 mg per tablet Commonly known as:  Robert Fairchild  
 Take 1 Tab by mouth every four (4) hours as needed for Pain. Max Daily Amount: 6 Tabs. KEFLEX 250 mg capsule Generic drug:  cephALEXin Take 250 mg by mouth two (2) times a day. sucralfate 1 gram tablet Commonly known as:  CARAFATE  
1 g. Introducing [facility]! Georges Huddleston introduces PublishThis patient portal. Now you can access parts of your medical record, email your doctor's office, and request medication refills online. 1. In your internet browser, go to [email or web address] 2. Click on the First Time User? Click Here link in the Sign In box. You will see the New Member Sign Up page. 3. Enter your PublishThis Access Code exactly as it appears below. You will not need to use this code after youve completed the sign-up process. If you do not sign up before the expiration date, you must request a new code. · PublishThis Access Code: [email or web address] Expires: 5/14/2018 11:01 AM 
 
4. Enter the last four digits of your Social Security Number (xxxx) and Date of Birth (mm/dd/yyyy) as indicated and click Submit. You will be taken to the next sign-up page. 5. Create a PublishThis ID. This will be your PublishThis login ID and cannot be changed, so think of one that is secure and easy to remember. 6. Create a PublishThis password. You can change your password at any time. 7. Enter your Password Reset Question and Answer. This can be used at a later time if you forget your password. 8. Enter your e-mail address. You will receive e-mail notification when new information is available in [address] 9. Click Sign Up. You can now view and download portions of your medical record. 10. Click the Download Summary menu link to download a portable copy of your medical information. If you have questions, please visit the Frequently Asked Questions section of the PublishThis website. Remember, PublishThis is NOT to be used for urgent needs. For medical emergencies, dial 911. Now available from your iPhone and Android! Please provide this summary of care documentation to your next provider. Your primary care clinician is listed as Lele Garcia. If you have any questions after today's visit, please call [phone].

## 2018-03-13 NOTE — PROGRESS NOTES
María Elena Tamayo. Osvaldo Chua, FNP-C  PROGRESS NOTE        Subjective:  Ms. Hawk Escobar is a very pleasant 63 yo AA female who presents today for a postop evaluation following left cephalic vein Mediport placement done on March 8, 2018. She has a newly diagnosed HER-2 positive invasive ductal carcinoma of the right breast.  She has seen Dr. Lissette Myers for her initial consult and has a follow-up scheduled with him for next Monday. We discussed her plan of care moving forward to include her chemotherapy as well as short interval visits with us for breast exams. Once her chemotherapy is complete, she can be reevaluated for surgical options. She did not have many questions, but seemed satisfied with the answers I provided. Today, she has no complaints related to her incision and states that she has not had any pain at all since surgery. She denies any fevers or chills and states there has been no drainage from her incision. Objective:  Vitals:    03/13/18 1411   BP: 138/82   Pulse: 77   Resp: 18   Temp: 98.7 °F (37.1 °C)   Weight: 51.7 kg (114 lb)       Physical Exam:    General: in no apparent distress, alert, oriented times 3, afebrile and cooperative   Incision:   healing well, no drainage, no erythema, no hernia, no seroma, no swelling, no dehiscence, incision well approximated. Glue still intact         Current Medications:  Current Outpatient Prescriptions   Medication Sig Dispense Refill    HYDROcodone-acetaminophen (NORCO) 5-325 mg per tablet Take 1 Tab by mouth every four (4) hours as needed for Pain. Max Daily Amount: 6 Tabs. 30 Tab 0    docusate sodium (COLACE) 100 mg capsule Take 1 Cap by mouth two (2) times a day for 90 days. 60 Cap 2    cephALEXin (KEFLEX) 250 mg capsule Take 250 mg by mouth two (2) times a day.  famotidine (PEPCID) 40 mg tablet 40 mg.      sucralfate (CARAFATE) 1 gram tablet 1 g. Chart and notes reviewed. Data reviewed. I have evaluated and examined the patient. Impression:    · Patient not quite 1 week out from left cephalic vein Mediport placement doing very well surgically. Plan:  · Keep already scheduled follow-up with Dr. Ronald Bailon  · Follow-up in 4 months for a clinical breast exam here  · Please call with any questions or concerns prior to next visit    Ms. Elma Kussmaul has a reminder for a \"due or due soon\" health maintenance. I have asked that she contact her primary care provider for follow-up on this health maintenance. Brian De La Torre.  Jonathan Verma, MASOUDP-C

## 2018-03-19 ENCOUNTER — OFFICE VISIT (OUTPATIENT)
Dept: ONCOLOGY | Age: 58
End: 2018-03-19

## 2018-03-19 ENCOUNTER — HOSPITAL ENCOUNTER (OUTPATIENT)
Dept: ONCOLOGY | Age: 58
Discharge: HOME | End: 2018-03-19

## 2018-03-19 VITALS
TEMPERATURE: 97.9 F | SYSTOLIC BLOOD PRESSURE: 135 MMHG | DIASTOLIC BLOOD PRESSURE: 80 MMHG | HEIGHT: 65 IN | WEIGHT: 116.6 LBS | BODY MASS INDEX: 19.43 KG/M2 | HEART RATE: 93 BPM

## 2018-03-19 DIAGNOSIS — C50.411 MALIGNANT NEOPLASM OF UPPER-OUTER QUADRANT OF RIGHT BREAST IN FEMALE, ESTROGEN RECEPTOR NEGATIVE (HCC): ICD-10-CM

## 2018-03-19 DIAGNOSIS — C50.411 MALIGNANT NEOPLASM OF UPPER-OUTER QUADRANT OF RIGHT BREAST IN FEMALE, ESTROGEN RECEPTOR NEGATIVE (HCC): Primary | ICD-10-CM

## 2018-03-19 DIAGNOSIS — Z17.1 MALIGNANT NEOPLASM OF UPPER-OUTER QUADRANT OF RIGHT BREAST IN FEMALE, ESTROGEN RECEPTOR NEGATIVE (HCC): Primary | ICD-10-CM

## 2018-03-19 DIAGNOSIS — Z17.1 MALIGNANT NEOPLASM OF UPPER-OUTER QUADRANT OF RIGHT BREAST IN FEMALE, ESTROGEN RECEPTOR NEGATIVE (HCC): ICD-10-CM

## 2018-03-19 NOTE — MR AVS SNAPSHOT
[address] [facility] 
 
 
 [facility] [address] [facility] [address] 
[phone] Patient: Elizabeth Santiago MRN: [id number] TPZ:[id number]/8/[id number] Visit Information Date & Time Provider Department Dept. Phone Encounter #  
 3/19/2018  9:00 AM Kyree Jean MD [facility] Oncology [phone] [id number] Follow-up Instructions Return in about 3 weeks (around 4/9/2018). Your Appointments 4/9/2018  2:15 PM  
Office Visit with Kyree Jean MD  
[address]  Oncology [facility] Appt Note: 3 WK RET  
 [facility] [address], [address] [place] [address] 7/13/2018  2:00 PM  
[facility] Appt Note: 4 month f/up [address] [facility] [address] [facility] [address] [id number] [address] [facility] Upcoming Health Maintenance Date Due Pneumococcal 19-64 Highest Risk (1 of 3 - PCV13) 9/5/1979 DTaP/Tdap/Td series (1 - Tdap) 9/5/1981 PAP AKA CERVICAL CYTOLOGY 9/5/1981 BREAST CANCER SCRN MAMMOGRAM 9/5/2010 FOBT Q 1 YEAR AGE 50-75 9/5/2010 Influenza Age 5 to Adult 8/1/2017 Allergies as of 3/19/2018  Review Complete On: 3/13/2018 By: Nadia Sam NP No Known Allergies Current Immunizations  Never Reviewed No immunizations on file. Not reviewed this visit You Were Diagnosed With   
  
 Codes Comments Malignant neoplasm of upper-outer quadrant of right breast in female, estrogen receptor negative ([facility] [address]    -  Primary ICD-10-CM: C50.411, Z17.1 ICD-9-CM: 174.4, V86.1 Vitals OB Status Smoking Status Hysterectomy Never Smoker Your Updated Medication List  
  
   
 This list is accurate as of 3/19/18  9:40 AM.  Always use your most recent med list.  
  
  
  
  
 docusate sodium 100 mg capsule Commonly known as:  Ruben Erm Take 1 Cap by mouth two (2) times a day for 90 days. famotidine 40 mg tablet Commonly known as:  PEPCID  
40 mg. HYDROcodone-acetaminophen 5-325 mg per tablet Commonly known as:  Brenda Gut Take 1 Tab by mouth every four (4) hours as needed for Pain. Max Daily Amount: 6 Tabs. KEFLEX 250 mg capsule Generic drug:  cephALEXin Take 250 mg by mouth two (2) times a day. sucralfate 1 gram tablet Commonly known as:  CARAFATE  
1 g. We Performed the Following COMPLETE CBC & AUTO DIFF WBC [83016 CPT(R)] Follow-up Instructions Return in about 3 weeks (around 4/9/2018). To-Do List   
 03/19/2018 Lab:  CBC WITH 3 PART DIFF Patient Instructions Breast Cancer: Care Instructions Your Care Instructions Breast cancer occurs when abnormal cells grow out of control in the breast. These cancer cells can spread within the breast, to nearby lymph nodes and other tissues, and to other parts of the body. Being treated for cancer can weaken your body, and you may feel very tired. Get the rest your body needs so you can feel better. Finding out that you have cancer is scary. You may feel many emotions and may need some help coping. Seek out family, friends, and counselors for support. You also can do things at home to make yourself feel better while you go through treatment. Call the [employer] (2-487.393.2063) or visit its website at [address] for more information. Follow-up care is a key part of your treatment and safety. Be sure to make and go to all appointments, and call your doctor if you are having problems. It's also a good idea to know your test results and keep a list of the medicines you take. How can you care for yourself at home? · Take your medicines exactly as prescribed. Call your doctor if you think you are having a problem with your medicine. You may get medicine for nausea and vomiting if you have these side effects. · Follow your doctor's instructions to relieve pain. Pain from cancer and surgery can almost always be controlled. Use pain medicine when you first notice pain, before it becomes severe. · Eat healthy food. If you do not feel like eating, try to eat food that has protein and extra calories to keep up your strength and prevent weight loss. Drink liquid meal replacements for extra calories and protein. Try to eat your main meal early. · Get some physical activity every day, but do not get too tired. Keep doing the hobbies you enjoy as your energy allows. · Do not smoke. Smoking can make your cancer worse. If you need help quitting, talk to your doctor about stop-smoking programs and medicines. These can increase your chances of quitting for good. · Take steps to control your stress and workload. Learn relaxation techniques. ¨ Share your feelings. Stress and tension affect our emotions. By expressing your feelings to others, you may be able to understand and cope with them. ¨ Consider joining a support group. Talking about a problem with your spouse, a good friend, or other people with similar problems is a good way to reduce tension and stress. ¨ Express yourself through art. Try writing, crafts, dance, or art to relieve stress. Some dance, writing, or art groups may be available just for people who have cancer. ¨ Be kind to your body and mind. Getting enough sleep, eating a healthy diet, and taking time to do things you enjoy can contribute to an overall feeling of balance in your life and can help reduce stress. ¨ Get help if you need it. Discuss your concerns with your doctor or counselor. · If you are vomiting or have diarrhea: ¨ Drink plenty of fluids (enough so that your urine is light yellow or clear like water) to prevent dehydration. Choose water and other caffeine-free clear liquids. If you have kidney, heart, or liver disease and have to limit fluids, talk with your doctor before you increase the amount of fluids you drink. ¨ When you are able to eat, try clear soups, mild foods, and liquids until all symptoms are gone for 12 to 48 hours. Other good choices include dry toast, crackers, cooked cereal, and gelatin dessert, such as Jell-O. · If you have not already done so, prepare a list of advance directives. Advance directives are instructions to your doctor and family members about what kind of care you want if you become unable to speak or express yourself. When should you call for help? Call 911 anytime you think you may need emergency care. For example, call if: 
? · You passed out (lost consciousness). ?Call your doctor now or seek immediate medical care if: 
? · You have a fever. ? · You have abnormal bleeding. ? · You think you have an infection. ? · You have new or worse pain. ? · You have new symptoms, such as a cough, belly pain, vomiting, diarrhea, or a rash. ? Watch closely for changes in your health, and be sure to contact your doctor if: 
? · You are much more tired than usual.  
? · You have swollen glands in your armpits, groin, or neck. ? · You do not get better as expected. Where can you learn more? Go to http://catalino-tiesha.info/. Enter V321 in the search box to learn more about \"Breast Cancer: Care Instructions. \" Current as of: May 12, 2017 Content Version: 11.4 © 3557-3580 [employer]. Care instructions adapted under license by [employer] (which disclaims liability or warranty for this information). If you have questions about a medical condition or this instruction, always ask your healthcare professional. [address] any warranty or liability for your use of this information. Introducing [facility] [place] [facility]! Domitila Merrill introduces [employer] patient portal. Now you can access parts of your medical record, email your doctor's office, and request medication refills online. 1. In your internet browser, go to [email or web address] 2. Click on the First Time User? Click Here link in the Sign In box. You will see the New Member Sign Up page. 3. Enter your [facility] Access Code exactly as it appears below. You will not need to use this code after youve completed the sign-up process. If you do not sign up before the expiration date, you must request a new code. · [employer] Access Code: [email or web address] Expires: 5/14/2018 11:01 AM 
 
4. Enter the last four digits of your Social Security Number (xxxx) and Date of Birth (mm/dd/yyyy) as indicated and click Submit. You will be taken to the next sign-up page. 5. Create a [employer] ID. This will be your [employer] login ID and cannot be changed, so think of one that is secure and easy to remember. 6. Create a [employer] password. You can change your password at any time. 7. Enter your Password Reset Question and Answer. This can be used at a later time if you forget your password. 8. Enter your e-mail address. You will receive e-mail notification when new information is available in [address] 9. Click Sign Up. You can now view and download portions of your medical record. 10. Click the Download Summary menu link to download a portable copy of your medical information. If you have questions, please visit the Frequently Asked Questions section of the [employer] website. Remember, [employer] is NOT to be used for urgent needs. For medical emergencies, dial 911. Now available from your iPhone and Android! Please provide this summary of care documentation to your next provider. Your primary care clinician is listed as Shaggy Corona. If you have any questions after today's visit, please call [phone].

## 2018-03-19 NOTE — PROGRESS NOTES
Hematology/medical oncology progress note    3/19/2018  Bishop Champion  YOB: 1960    Diagnosis: HER-2 positive invasive ductal carcinoma, right breast    Ms. Dian Ag is a 59-year-old woman who recently completed CT scans through the chest, abdomen, and pelvis with contrast.  The imaging studies confirmed a small enhancing mass in the right upper outer quadrant of the right breast.  She also had multiple bilateral pulmonary nodules consistent with her prior history of old granulomatous disease. The patient reports that she was treated for TB when she was a child. The bone scan revealed no evidence of bone metastasis. She has Invasive ductal adenocarcinoma, right breast, hormone receptor negative, and HER-2 positive: At this time I am recommending that we proceed with neoadjuvant systemic therapy comprised of a combination of carboplatin and AUC of 6 given intravenously on day 1, docetaxel and a dose of 75 mg/m² IV on day 1, trastuzumab 8 mg/kg IV loading dose on day 1 then 6 mg/kg IV every 3 weeks, and pertuzumab and a loading dose of 840 mg IV on day 1 and then 420 mg IV every 3 weeks thereafter for about 6 cycles. Additionally, because of the high risk for neutropenia and fever with this regimen she will require Neulasta to be given following each cycle of the carboplatin and Taxotere. This will be followed by surgery. The patient had an Mediport inserted. The Mediport site is intact with no evidence of inflammation or infection. The site has healed appropriately. The patient completed her pretreatment MUGA scan on 3/2/2018 and has a normal cardiac ejection fraction of 58%. At this time she had her questions answered to her satisfaction. We have discussed the risk and benefit profile of the recommended treatment options. The patient has signed a consent form and will now be scheduled to begin her treatment later this week or early next week.   Total time 30 minutes, greater than 50% of the time was in counseling and coordination of care. I will have the patient return to the clinic in about 3 weeks. Tito Dover MD, [address]

## 2018-03-19 NOTE — PATIENT INSTRUCTIONS
Breast Cancer: Care Instructions  Your Care Instructions    Breast cancer occurs when abnormal cells grow out of control in the breast. These cancer cells can spread within the breast, to nearby lymph nodes and other tissues, and to other parts of the body. Being treated for cancer can weaken your body, and you may feel very tired. Get the rest your body needs so you can feel better. Finding out that you have cancer is scary. You may feel many emotions and may need some help coping. Seek out family, friends, and counselors for support. You also can do things at home to make yourself feel better while you go through treatment. Call the [employer] (4-300.498.7036) or visit its website at [employer] for more information. Follow-up care is a key part of your treatment and safety. Be sure to make and go to all appointments, and call your doctor if you are having problems. It's also a good idea to know your test results and keep a list of the medicines you take. How can you care for yourself at home? · Take your medicines exactly as prescribed. Call your doctor if you think you are having a problem with your medicine. You may get medicine for nausea and vomiting if you have these side effects. · Follow your doctor's instructions to relieve pain. Pain from cancer and surgery can almost always be controlled. Use pain medicine when you first notice pain, before it becomes severe. · Eat healthy food. If you do not feel like eating, try to eat food that has protein and extra calories to keep up your strength and prevent weight loss. Drink liquid meal replacements for extra calories and protein. Try to eat your main meal early. · Get some physical activity every day, but do not get too tired. Keep doing the hobbies you enjoy as your energy allows. · Do not smoke. Smoking can make your cancer worse. If you need help quitting, talk to your doctor about stop-smoking programs and medicines.  These can increase your chances of quitting for good. · Take steps to control your stress and workload. Learn relaxation techniques. ¨ Share your feelings. Stress and tension affect our emotions. By expressing your feelings to others, you may be able to understand and cope with them. ¨ Consider joining a support group. Talking about a problem with your spouse, a good friend, or other people with similar problems is a good way to reduce tension and stress. ¨ Express yourself through art. Try writing, crafts, dance, or art to relieve stress. Some dance, writing, or art groups may be available just for people who have cancer. ¨ Be kind to your body and mind. Getting enough sleep, eating a healthy diet, and taking time to do things you enjoy can contribute to an overall feeling of balance in your life and can help reduce stress. ¨ Get help if you need it. Discuss your concerns with your doctor or counselor. · If you are vomiting or have diarrhea:  ¨ Drink plenty of fluids (enough so that your urine is light yellow or clear like water) to prevent dehydration. Choose water and other caffeine-free clear liquids. If you have kidney, heart, or liver disease and have to limit fluids, talk with your doctor before you increase the amount of fluids you drink. ¨ When you are able to eat, try clear soups, mild foods, and liquids until all symptoms are gone for 12 to 48 hours. Other good choices include dry toast, crackers, cooked cereal, and gelatin dessert, such as Jell-O.  · If you have not already done so, prepare a list of advance directives. Advance directives are instructions to your doctor and family members about what kind of care you want if you become unable to speak or express yourself. When should you call for help? Call 911 anytime you think you may need emergency care. For example, call if:  ? · You passed out (lost consciousness). ?Call your doctor now or seek immediate medical care if:  ? · You have a fever. ? · You have abnormal bleeding. ? · You think you have an infection. ? · You have new or worse pain. ? · You have new symptoms, such as a cough, belly pain, vomiting, diarrhea, or a rash. ? Watch closely for changes in your health, and be sure to contact your doctor if:  ? · You are much more tired than usual.   ? · You have swollen glands in your armpits, groin, or neck. ? · You do not get better as expected. Where can you learn more? Go to http://catalino-tiesha.info/. Enter V321 in the search box to learn more about \"Breast Cancer: Care Instructions. \"  Current as of: May 12, 2017  Content Version: 11.4  © 6274-2077 [employer]. Care instructions adapted under license by [employer] (which disclaims liability or warranty for this information). If you have questions about a medical condition or this instruction, always ask your healthcare professional. [address] any warranty or liability for your use of this information.

## 2018-03-21 ENCOUNTER — TELEPHONE (OUTPATIENT)
Dept: ONCOLOGY | Age: 58
End: 2018-03-21

## 2018-03-21 ENCOUNTER — APPOINTMENT (OUTPATIENT)
Dept: INFUSION THERAPY | Age: 58
End: 2018-03-21

## 2018-03-22 ENCOUNTER — DOCUMENTATION ONLY (OUTPATIENT)
Dept: ONCOLOGY | Age: 58
End: 2018-03-22

## 2018-03-22 RX ORDER — WARFARIN 1 MG/1
TABLET ORAL
Qty: 30 TAB | Refills: 6 | Status: SHIPPED | OUTPATIENT
Start: 2018-03-22 | End: 2018-03-23 | Stop reason: SDUPTHER

## 2018-03-22 RX ORDER — PALONOSETRON 0.05 MG/ML
0.25 INJECTION, SOLUTION INTRAVENOUS ONCE
Status: CANCELLED | OUTPATIENT
Start: 2018-03-26 | End: 2018-03-26

## 2018-03-22 RX ORDER — ONDANSETRON HYDROCHLORIDE 8 MG/1
TABLET, FILM COATED ORAL
Qty: 30 TAB | Refills: 3 | Status: SHIPPED | OUTPATIENT
Start: 2018-03-22 | End: 2018-03-23 | Stop reason: SDUPTHER

## 2018-03-22 RX ORDER — DEXAMETHASONE 4 MG/1
TABLET ORAL
Qty: 24 TAB | Refills: 3 | Status: SHIPPED | OUTPATIENT
Start: 2018-03-22 | End: 2018-04-03 | Stop reason: SDUPTHER

## 2018-03-22 RX ORDER — PROCHLORPERAZINE MALEATE 10 MG
TABLET ORAL
Qty: 60 TAB | Refills: 2 | Status: SHIPPED | OUTPATIENT
Start: 2018-03-22 | End: 2018-03-23 | Stop reason: SDUPTHER

## 2018-03-22 RX ORDER — LIDOCAINE AND PRILOCAINE 25; 25 MG/G; MG/G
CREAM TOPICAL
Qty: 30 G | Refills: 3 | Status: SHIPPED | OUTPATIENT
Start: 2018-03-22 | End: 2018-03-23 | Stop reason: SDUPTHER

## 2018-03-23 ENCOUNTER — TELEPHONE (OUTPATIENT)
Dept: ONCOLOGY | Age: 58
End: 2018-03-23

## 2018-03-23 RX ORDER — PROCHLORPERAZINE MALEATE 10 MG
TABLET ORAL
Qty: 60 TAB | Refills: 2 | Status: CANCELLED | OUTPATIENT
Start: 2018-03-23

## 2018-03-23 RX ORDER — WARFARIN 1 MG/1
TABLET ORAL
Qty: 30 TAB | Refills: 6 | Status: SHIPPED | OUTPATIENT
Start: 2018-03-23 | End: 2018-03-23 | Stop reason: SDUPTHER

## 2018-03-23 RX ORDER — WARFARIN 1 MG/1
TABLET ORAL
Qty: 30 TAB | Refills: 6 | Status: SHIPPED | OUTPATIENT
Start: 2018-03-23 | End: 2018-10-16

## 2018-03-23 RX ORDER — PROCHLORPERAZINE MALEATE 10 MG
TABLET ORAL
Qty: 60 TAB | Refills: 2 | Status: SHIPPED | OUTPATIENT
Start: 2018-03-23 | End: 2018-10-16

## 2018-03-23 RX ORDER — LIDOCAINE AND PRILOCAINE 25; 25 MG/G; MG/G
CREAM TOPICAL
Qty: 30 G | Refills: 3 | Status: CANCELLED | OUTPATIENT
Start: 2018-03-23

## 2018-03-23 RX ORDER — ONDANSETRON HYDROCHLORIDE 8 MG/1
TABLET, FILM COATED ORAL
Qty: 30 TAB | Refills: 3 | Status: CANCELLED | OUTPATIENT
Start: 2018-03-23

## 2018-03-23 RX ORDER — ONDANSETRON HYDROCHLORIDE 8 MG/1
TABLET, FILM COATED ORAL
Qty: 30 TAB | Refills: 3 | Status: SHIPPED | OUTPATIENT
Start: 2018-03-23 | End: 2018-10-16

## 2018-03-23 RX ORDER — WARFARIN 1 MG/1
TABLET ORAL
Qty: 30 TAB | Refills: 6 | Status: CANCELLED | OUTPATIENT
Start: 2018-03-23

## 2018-03-23 RX ORDER — ONDANSETRON HYDROCHLORIDE 8 MG/1
TABLET, FILM COATED ORAL
Qty: 30 TAB | Refills: 3 | Status: SHIPPED | OUTPATIENT
Start: 2018-03-23 | End: 2018-03-23 | Stop reason: SDUPTHER

## 2018-03-23 RX ORDER — PROCHLORPERAZINE MALEATE 10 MG
TABLET ORAL
Qty: 60 TAB | Refills: 2 | Status: SHIPPED | OUTPATIENT
Start: 2018-03-23 | End: 2018-03-23 | Stop reason: SDUPTHER

## 2018-03-23 RX ORDER — LIDOCAINE AND PRILOCAINE 25; 25 MG/G; MG/G
CREAM TOPICAL
Qty: 30 G | Refills: 3 | Status: SHIPPED | OUTPATIENT
Start: 2018-03-23 | End: 2018-10-16

## 2018-03-23 RX ORDER — LIDOCAINE AND PRILOCAINE 25; 25 MG/G; MG/G
CREAM TOPICAL
Qty: 30 G | Refills: 3 | Status: SHIPPED | OUTPATIENT
Start: 2018-03-23 | End: 2018-03-23 | Stop reason: SDUPTHER

## 2018-03-26 ENCOUNTER — HOSPITAL ENCOUNTER (OUTPATIENT)
Dept: INFUSION THERAPY | Age: 58
Discharge: HOME | End: 2018-03-26

## 2018-03-26 RX ORDER — PALONOSETRON 0.05 MG/ML
0.25 INJECTION, SOLUTION INTRAVENOUS ONCE
Status: DISCONTINUED | OUTPATIENT
Start: 2018-03-26 | End: 2018-03-26

## 2018-03-26 RX ORDER — PALONOSETRON 0.05 MG/ML
0.25 INJECTION, SOLUTION INTRAVENOUS ONCE
Status: CANCELLED | OUTPATIENT
Start: 2018-03-30 | End: 2018-03-30

## 2018-03-26 RX ORDER — SODIUM CHLORIDE 9 MG/ML
500 INJECTION, SOLUTION INTRAVENOUS ONCE
Status: CANCELLED | OUTPATIENT
Start: 2018-03-26 | End: 2018-03-26

## 2018-03-26 RX ORDER — SODIUM CHLORIDE 0.9 % (FLUSH) 0.9 %
10-40 SYRINGE (ML) INJECTION AS NEEDED
Status: CANCELLED | OUTPATIENT
Start: 2018-03-26

## 2018-03-26 RX ORDER — HEPARIN SODIUM (PORCINE) LOCK FLUSH IV SOLN 100 UNIT/ML 100 UNIT/ML
500 SOLUTION INTRAVENOUS AS NEEDED
Status: CANCELLED | OUTPATIENT
Start: 2018-03-26 | End: 2018-03-27

## 2018-03-30 ENCOUNTER — HOSPITAL ENCOUNTER (OUTPATIENT)
Dept: INFUSION THERAPY | Age: 58
End: 2018-03-30

## 2018-03-30 RX ORDER — PALONOSETRON 0.05 MG/ML
0.25 INJECTION, SOLUTION INTRAVENOUS ONCE
Status: DISCONTINUED | OUTPATIENT
Start: 2018-03-30 | End: 2018-03-30

## 2018-04-03 ENCOUNTER — HOSPITAL ENCOUNTER (OUTPATIENT)
Dept: INFUSION THERAPY | Age: 58
Discharge: HOME | End: 2018-04-03
Payer: MEDICAID

## 2018-04-03 VITALS
RESPIRATION RATE: 18 BRPM | HEART RATE: 72 BPM | TEMPERATURE: 98 F | SYSTOLIC BLOOD PRESSURE: 133 MMHG | OXYGEN SATURATION: 100 % | BODY MASS INDEX: 19.19 KG/M2 | HEIGHT: 65 IN | WEIGHT: 115.2 LBS | DIASTOLIC BLOOD PRESSURE: 84 MMHG

## 2018-04-03 LAB
ALBUMIN SERPL-MCNC: 3.2 G/DL (ref 3.4–5)
ALBUMIN/GLOB SERPL: 0.8 {RATIO} (ref 0.8–1.7)
ALP SERPL-CCNC: 68 U/L (ref 45–117)
ALT SERPL-CCNC: 17 U/L (ref 13–56)
ANION GAP BLD CALC-SCNC: 16 MMOL/L (ref 10–20)
AST SERPL-CCNC: 16 U/L (ref 15–37)
BASO+EOS+MONOS # BLD AUTO: 0.4 K/UL (ref 0–2.3)
BASO+EOS+MONOS # BLD AUTO: 8 % (ref 0.1–17)
BILIRUB DIRECT SERPL-MCNC: <0.1 MG/DL (ref 0–0.2)
BILIRUB SERPL-MCNC: 0.3 MG/DL (ref 0.2–1)
BUN BLD-MCNC: 13 MG/DL (ref 7–18)
CA-I BLD-MCNC: 1.25 MMOL/L (ref 1.12–1.32)
CHLORIDE BLD-SCNC: 103 MMOL/L (ref 100–108)
CO2 BLD-SCNC: 25 MMOL/L (ref 19–24)
CREAT UR-MCNC: 0.7 MG/DL (ref 0.6–1.3)
DIFFERENTIAL METHOD BLD: ABNORMAL
ERYTHROCYTE [DISTWIDTH] IN BLOOD BY AUTOMATED COUNT: 13.3 % (ref 11.5–14.5)
GLOBULIN SER CALC-MCNC: 4.1 G/DL (ref 2–4)
GLUCOSE BLD STRIP.AUTO-MCNC: 97 MG/DL (ref 74–106)
HCT VFR BLD AUTO: 33.8 % (ref 36–48)
HCT VFR BLD CALC: 34 % (ref 36–49)
HGB BLD-MCNC: 11.3 G/DL (ref 12–16)
HGB BLD-MCNC: 11.6 G/DL (ref 12–16)
LYMPHOCYTES # BLD: 1.4 K/UL (ref 1.1–5.9)
LYMPHOCYTES NFR BLD: 28 % (ref 14–44)
MCH RBC QN AUTO: 30.3 PG (ref 25–35)
MCHC RBC AUTO-ENTMCNC: 33.4 G/DL (ref 31–37)
MCV RBC AUTO: 90.6 FL (ref 78–102)
NEUTS SEG # BLD: 3.2 K/UL (ref 1.8–9.5)
NEUTS SEG NFR BLD: 64 % (ref 40–70)
PLATELET # BLD AUTO: 199 K/UL (ref 140–440)
POTASSIUM BLD-SCNC: 3.9 MMOL/L (ref 3.5–5.5)
PROT SERPL-MCNC: 7.3 G/DL (ref 6.4–8.2)
RBC # BLD AUTO: 3.73 M/UL (ref 4.1–5.1)
SODIUM BLD-SCNC: 140 MMOL/L (ref 136–145)
WBC # BLD AUTO: 5 K/UL (ref 4.5–13)

## 2018-04-03 PROCEDURE — 96367 TX/PROPH/DG ADDL SEQ IV INF: CPT

## 2018-04-03 PROCEDURE — 96417 CHEMO IV INFUS EACH ADDL SEQ: CPT

## 2018-04-03 PROCEDURE — 80076 HEPATIC FUNCTION PANEL: CPT | Performed by: INTERNAL MEDICINE

## 2018-04-03 PROCEDURE — 74011250636 HC RX REV CODE- 250/636: Performed by: INTERNAL MEDICINE

## 2018-04-03 PROCEDURE — 96413 CHEMO IV INFUSION 1 HR: CPT

## 2018-04-03 PROCEDURE — 77030012965 HC NDL HUBR BBMI -A

## 2018-04-03 PROCEDURE — 96375 TX/PRO/DX INJ NEW DRUG ADDON: CPT

## 2018-04-03 PROCEDURE — 96415 CHEMO IV INFUSION ADDL HR: CPT

## 2018-04-03 PROCEDURE — 80047 BASIC METABLC PNL IONIZED CA: CPT

## 2018-04-03 PROCEDURE — 85025 COMPLETE CBC W/AUTO DIFF WBC: CPT | Performed by: INTERNAL MEDICINE

## 2018-04-03 PROCEDURE — 74011000258 HC RX REV CODE- 258: Performed by: INTERNAL MEDICINE

## 2018-04-03 PROCEDURE — 96377 APPLICATON ON-BODY INJECTOR: CPT

## 2018-04-03 PROCEDURE — 74011250636 HC RX REV CODE- 250/636

## 2018-04-03 RX ORDER — SODIUM CHLORIDE 9 MG/ML
500 INJECTION, SOLUTION INTRAVENOUS ONCE
Status: COMPLETED | OUTPATIENT
Start: 2018-04-03 | End: 2018-04-03

## 2018-04-03 RX ORDER — HEPARIN SODIUM (PORCINE) LOCK FLUSH IV SOLN 100 UNIT/ML 100 UNIT/ML
500 SOLUTION INTRAVENOUS AS NEEDED
Status: DISPENSED | OUTPATIENT
Start: 2018-04-03 | End: 2018-04-04

## 2018-04-03 RX ORDER — SODIUM CHLORIDE 0.9 % (FLUSH) 0.9 %
10-40 SYRINGE (ML) INJECTION AS NEEDED
Status: DISCONTINUED | OUTPATIENT
Start: 2018-04-03 | End: 2018-04-07 | Stop reason: HOSPADM

## 2018-04-03 RX ORDER — DEXAMETHASONE 4 MG/1
TABLET ORAL
Qty: 24 TAB | Refills: 3 | Status: SHIPPED | OUTPATIENT
Start: 2018-04-03 | End: 2018-06-06 | Stop reason: SDUPTHER

## 2018-04-03 RX ORDER — PALONOSETRON 0.05 MG/ML
0.25 INJECTION, SOLUTION INTRAVENOUS ONCE
Status: COMPLETED | OUTPATIENT
Start: 2018-04-03 | End: 2018-04-03

## 2018-04-03 RX ADMIN — DEXAMETHASONE SODIUM PHOSPHATE 12 MG: 4 INJECTION, SOLUTION INTRA-ARTICULAR; INTRALESIONAL; INTRAMUSCULAR; INTRAVENOUS; SOFT TISSUE at 09:11

## 2018-04-03 RX ADMIN — SODIUM CHLORIDE 150 MG: 900 INJECTION, SOLUTION INTRAVENOUS at 09:11

## 2018-04-03 RX ADMIN — Medication 10 ML: at 08:59

## 2018-04-03 RX ADMIN — PEGFILGRASTIM 6 MG: KIT SUBCUTANEOUS at 16:29

## 2018-04-03 RX ADMIN — DOCETAXEL ANHYDROUS 120 MG: 10 INJECTION, SOLUTION INTRAVENOUS at 14:04

## 2018-04-03 RX ADMIN — CARBOPLATIN 540 MG: 10 INJECTION, SOLUTION INTRAVENOUS at 15:09

## 2018-04-03 RX ADMIN — SODIUM CHLORIDE 500 ML: 9 INJECTION, SOLUTION INTRAVENOUS at 09:00

## 2018-04-03 RX ADMIN — HEPARIN SODIUM (PORCINE) LOCK FLUSH IV SOLN 100 UNIT/ML 500 UNITS: 100 SOLUTION at 16:22

## 2018-04-03 RX ADMIN — PERTUZUMAB 840 MG: 30 INJECTION, SOLUTION, CONCENTRATE INTRAVENOUS at 11:47

## 2018-04-03 RX ADMIN — TRASTUZUMAB 420 MG: 150 INJECTION, POWDER, LYOPHILIZED, FOR SOLUTION INTRAVENOUS at 10:07

## 2018-04-03 RX ADMIN — PALONOSETRON HYDROCHLORIDE 0.25 MG: 0.25 INJECTION INTRAVENOUS at 09:05

## 2018-04-03 RX ADMIN — Medication 20 ML: at 16:22

## 2018-04-03 NOTE — PROGRESS NOTES
[facility] Progress Note    Date: April 3, 2018    Name: Lucho Valle              MRN: [id number]              : 1960    Chemotherapy Cycle: Perjeta/Herceptin/Taxol/Carbo C1D1       Pt to [facility], ambulatory, at [facility]. Ms. Pam Davis was assessed and education was provided. Care notes were printed off and given to the patient. A signed copy was scanned into the media tab. Ms. Ventura's vitals were reviewed. Visit Vitals    BP (!) 142/92 (BP 1 Location: Left arm, BP Patient Position: Sitting)    Pulse 89    Temp 98.3 °F (36.8 °C)    Resp 18    Ht 5' 4.96\" (1.65 m)    Wt 52.3 kg (115 lb 3.2 oz)    SpO2 100%    Breastfeeding No    BMI 19.19 kg/m2       Left chest double lumen mediport accessed with 20 g 1 inch johnson needle. Port flushed easily and had brisk blood return. Blood drawn off and sent for CBC, BMP and Hepatic Function Panel per written orders after 10 ml waste. CBC run on HOLLR and BMP run in house on the [employer]. NS initiated @ [facility]. Patient states that she is not pregnant and refused a pregnancy test today. Lab results were obtained and reviewed. Recent Results (from the past 12 hour(s))   CBC WITH 3 PART DIFF    Collection Time: 18  8:42 AM   Result Value Ref Range    WBC 5.0 4.5 - 13.0 K/uL    RBC 3.73 (L) 4.10 - 5.10 M/uL    HGB 11.3 (L) 12.0 - 16 g/dL    HCT 33.8 (L) 36 - 48 %    MCV 90.6 78 - 102 FL    MCH 30.3 25.0 - 35.0 PG    MCHC 33.4 31 - 37 g/dL    RDW 13.3 11.5 - 14.5 %    PLATELET 598 466 - 420 K/uL    NEUTROPHILS 64 40 - 70 %    MIXED CELLS 8 0.1 - 17 %    LYMPHOCYTES 28 14 - 44 %    ABS. NEUTROPHILS 3.2 1.8 - 9.5 K/UL    ABS. MIXED CELLS 0.4 0.0 - 2.3 K/uL    ABS.  LYMPHOCYTES 1.4 1.1 - 5.9 K/UL    DF AUTOMATED     POC CHEM8    Collection Time: 18  8:49 AM   Result Value Ref Range    CO2, POC 25 (H) 19 - 24 MMOL/L    Glucose, POC 97 74 - 106 MG/DL    BUN, POC 13 7 - 18 MG/DL    Creatinine, POC 0.7 0.6 - 1.3 MG/DL    GFRAA, POC >60 >60 ml/min/1.73m2 GFRNA, POC >60 >60 ml/min/1.73m2    Sodium,  136 - 145 MMOL/L    Potassium, POC 3.9 3.5 - 5.5 MMOL/L    Calcium, ionized (POC) 1.25 1.12 - 1.32 mmol/L    Chloride,  100 - 108 MMOL/L    Anion gap, POC 16 10 - 20      Hematocrit, POC 34 (L) 36 - 49 %    Hemoglobin, POC 11.6 (L) 12 - 16 G/DL       Lab results within ordered parameters to give chemo today. PLT = 199, ANC = 3.2. Chemo dosages verified with today's BSA and creatinine and found to be within 10% of ordered dosages. Patient states that she never received her Decadron from her pharmacy therefore did not take it last night. Petra Raines NP was notified and she e-sent the order to the patient's pharmacy on file. Since she did not take any Decadron last night she will be given IV Decadron today as a pre-medication. I called and verified with the pharmacy that they received her prescription and the patient was instructed to go and  it up. She was also given verbal instructions on when to take the medication and she stated understanding. Pre-medications (Emend 150 mg, Aloxi 0.25 mg, and Decadron 12 mg) were administered as ordered and chemotherapy was initiated after blood return from port re-verified. Reviewed expected side effects of premeds with patient. Trastuzumab 420 mg was initiated to run over 90 minuets per written order. 15 minutes into infusion, VS stable and pt denied itching/hives, lip/tongue/facial swelling, SOB, back pain, or other complaints. Pt tolerated entire infusion well and VS remained stable. Line flushed with NS and blood return from port re-verified. Pertuzumab 840 mg was initiated to run over 60 minuets per written order. 15 minutes into infusion, VS stable and pt denied itching/hives, lip/tongue/facial swelling, SOB, back pain, or other complaints. Pt tolerated entire infusion well and VS remained stable. Line flushed with NS and blood return from port re-verified.  Patient was observed for 60 minuets post infusion per written order. No reactions or distress noted. Docetaxel 120 mg was initiated to run over 60 minuets per written order. 15 minutes into infusion, VS stable and pt denied itching/hives, lip/tongue/facial swelling, SOB, back pain, or other complaints. Pt tolerated entire infusion well and VS remained stable. Line flushed with NS and blood return from port re-verified. Carboplatin 540 mg was initiated to run over 60 minuets per written order. VS stable at end of infusion and pt denied complaints. Line flushed with NS and blood return from port re-verified. Ms. Pedro Magaña tolerated infusion, and had no complaints at this time. Patient politely refused her post infusion observation period at this time. She states that she feels fine and is ready to go home. Patient Vitals for the past 12 hrs:   Temp Pulse Resp BP SpO2   04/03/18 1621 98 °F (36.7 °C) 72 18 133/84 100 %   04/03/18 1359 98 °F (36.7 °C) 88 18 119/75 100 %   04/03/18 1302 98.2 °F (36.8 °C) 78 18 138/88 100 %   04/03/18 0822 98.3 °F (36.8 °C) 89 18 (!) 142/92 100 %       Mediport flushed with NS 20 ml and Heparin 500 units then de-accessed. No irritation or bleeding noted. Bandaid applied. Neulasta 6 mg OBI was placed on her right arm. The green light was noted to be on and blinking. Patient was told that she could remove the OBI tomorrow, 4/4/2018 at approximately 8:30 pm. Patient was given instructions on the medication and she stated understanding. Patient armband removed and shredded. Ms. Pedro Magaña was discharged from [place] [address] in stable condition at 1630. She is to return on 04/24/2018 at 0800 for her next chemo appointment.     Tiffany Borrero RN  April 3, 2018

## 2018-04-16 ENCOUNTER — APPOINTMENT (OUTPATIENT)
Dept: INFUSION THERAPY | Age: 58
End: 2018-04-16
Payer: MEDICAID

## 2018-04-18 ENCOUNTER — OFFICE VISIT (OUTPATIENT)
Dept: ONCOLOGY | Age: 58
End: 2018-04-18

## 2018-04-18 ENCOUNTER — HOSPITAL ENCOUNTER (OUTPATIENT)
Dept: LAB | Age: 58
Discharge: HOME | End: 2018-04-18
Payer: MEDICAID

## 2018-04-18 ENCOUNTER — HOSPITAL ENCOUNTER (OUTPATIENT)
Dept: ONCOLOGY | Age: 58
Discharge: HOME | End: 2018-04-18

## 2018-04-18 VITALS
TEMPERATURE: 97.5 F | WEIGHT: 113 LBS | HEART RATE: 86 BPM | BODY MASS INDEX: 18.83 KG/M2 | SYSTOLIC BLOOD PRESSURE: 125 MMHG | DIASTOLIC BLOOD PRESSURE: 75 MMHG

## 2018-04-18 DIAGNOSIS — C50.411 MALIGNANT NEOPLASM OF UPPER-OUTER QUADRANT OF RIGHT BREAST IN FEMALE, ESTROGEN RECEPTOR NEGATIVE (HCC): Primary | ICD-10-CM

## 2018-04-18 DIAGNOSIS — D64.81 ANTINEOPLASTIC CHEMOTHERAPY INDUCED ANEMIA: ICD-10-CM

## 2018-04-18 DIAGNOSIS — T45.1X5A ANTINEOPLASTIC CHEMOTHERAPY INDUCED ANEMIA: ICD-10-CM

## 2018-04-18 DIAGNOSIS — C50.411 MALIGNANT NEOPLASM OF UPPER-OUTER QUADRANT OF RIGHT BREAST IN FEMALE, ESTROGEN RECEPTOR NEGATIVE (HCC): ICD-10-CM

## 2018-04-18 DIAGNOSIS — Z17.1 MALIGNANT NEOPLASM OF UPPER-OUTER QUADRANT OF RIGHT BREAST IN FEMALE, ESTROGEN RECEPTOR NEGATIVE (HCC): ICD-10-CM

## 2018-04-18 DIAGNOSIS — R53.83 FATIGUE DUE TO TREATMENT: ICD-10-CM

## 2018-04-18 DIAGNOSIS — Z17.1 MALIGNANT NEOPLASM OF UPPER-OUTER QUADRANT OF RIGHT BREAST IN FEMALE, ESTROGEN RECEPTOR NEGATIVE (HCC): Primary | ICD-10-CM

## 2018-04-18 LAB
ALBUMIN SERPL-MCNC: 3.1 G/DL (ref 3.4–5)
ALBUMIN/GLOB SERPL: 1 {RATIO} (ref 0.8–1.7)
ALP SERPL-CCNC: 71 U/L (ref 45–117)
ALT SERPL-CCNC: 24 U/L (ref 13–56)
ANION GAP SERPL CALC-SCNC: 11 MMOL/L (ref 3–18)
AST SERPL-CCNC: 20 U/L (ref 15–37)
BASO+EOS+MONOS # BLD AUTO: 0.8 K/UL (ref 0–2.3)
BASO+EOS+MONOS # BLD AUTO: 5 % (ref 0.1–17)
BILIRUB SERPL-MCNC: 0.2 MG/DL (ref 0.2–1)
BUN SERPL-MCNC: 9 MG/DL (ref 7–18)
BUN/CREAT SERPL: 12 (ref 12–20)
CALCIUM SERPL-MCNC: 7.6 MG/DL (ref 8.5–10.1)
CHLORIDE SERPL-SCNC: 105 MMOL/L (ref 100–108)
CO2 SERPL-SCNC: 25 MMOL/L (ref 21–32)
CREAT SERPL-MCNC: 0.73 MG/DL (ref 0.6–1.3)
DIFFERENTIAL METHOD BLD: ABNORMAL
ERYTHROCYTE [DISTWIDTH] IN BLOOD BY AUTOMATED COUNT: 14 % (ref 11.5–14.5)
FERRITIN SERPL-MCNC: 326 NG/ML (ref 8–388)
GLOBULIN SER CALC-MCNC: 3.2 G/DL (ref 2–4)
GLUCOSE SERPL-MCNC: 74 MG/DL (ref 74–99)
HCT VFR BLD AUTO: 31.8 % (ref 36–48)
HGB BLD-MCNC: 10.7 G/DL (ref 12–16)
IRON SATN MFR SERPL: 30 %
IRON SERPL-MCNC: 62 UG/DL (ref 50–175)
LYMPHOCYTES # BLD: 3.6 K/UL (ref 1.1–5.9)
LYMPHOCYTES NFR BLD: 21 % (ref 14–44)
MCH RBC QN AUTO: 30.5 PG (ref 25–35)
MCHC RBC AUTO-ENTMCNC: 33.6 G/DL (ref 31–37)
MCV RBC AUTO: 90.6 FL (ref 78–102)
NEUTS SEG # BLD: 13 K/UL (ref 1.8–9.5)
NEUTS SEG NFR BLD: 74 % (ref 40–70)
PLATELET # BLD AUTO: 209 K/UL (ref 140–440)
POTASSIUM SERPL-SCNC: 3 MMOL/L (ref 3.5–5.5)
PROT SERPL-MCNC: 6.3 G/DL (ref 6.4–8.2)
RBC # BLD AUTO: 3.51 M/UL (ref 4.1–5.1)
SODIUM SERPL-SCNC: 141 MMOL/L (ref 136–145)
TIBC SERPL-MCNC: 209 UG/DL (ref 250–450)
WBC # BLD AUTO: 17.4 K/UL (ref 4.5–13)

## 2018-04-18 PROCEDURE — 80053 COMPREHEN METABOLIC PANEL: CPT | Performed by: INTERNAL MEDICINE

## 2018-04-18 PROCEDURE — 36415 COLL VENOUS BLD VENIPUNCTURE: CPT | Performed by: INTERNAL MEDICINE

## 2018-04-18 PROCEDURE — 83540 ASSAY OF IRON: CPT | Performed by: INTERNAL MEDICINE

## 2018-04-18 PROCEDURE — 82728 ASSAY OF FERRITIN: CPT | Performed by: INTERNAL MEDICINE

## 2018-04-18 NOTE — PROGRESS NOTES
Hematology/Oncology  Progress Note    Name: Nelson Montes De Oca  Date: 2018  : 1960    PCP: Chacho Saunders MD     Ms. Susan Crowley is a 62 y.o. -American woman with HER-2 positive invasive ductal adenocarcinoma the right breast    Current therapy: Carboplatin, Taxotere, and Herceptin plus Perjeda    Subjective:     Ms. Susan Crowley is a 49-year-old -American woman who is currently receiving neoadjuvant systemic therapy for her HER-2 positive invasive ductal adenocarcinoma the right breast.  On 4/3/2018 she completed cycle 1 of her treatment. The patient reports that she tolerated the medication well. She did not experience any significant nausea or emesis. She has experienced a mild degree of fatigue. Her appetite has increased significantly since starting the chemotherapy. Otherwise she has no additional complaints or concerns to report. Past medical history, family history, and social history: these were reviewed and remains unchanged. Past Medical History:   Diagnosis Date    Cancer [facility])     right breast    Hepatitis B     Tuberculosis      Past Surgical History:   Procedure Laterality Date    HX GYN      hysterectomy     HX OTHER SURGICAL      neck surgery     HI INSJ PRPH CTR VAD W/SUBQ PORT AGE 5 YR/> N/A 2018    Dr. Shola Rudolph History     Social History    Marital status: SINGLE     Spouse name: N/A    Number of children: N/A    Years of education: N/A     Occupational History    Not on file.      Social History Main Topics    Smoking status: Never Smoker    Smokeless tobacco: Current User    Alcohol use 1.2 oz/week     2 Cans of beer per week      Comment: weekly     Drug use: No    Sexual activity: No     Other Topics Concern    Not on file     Social History Narrative     Family History   Problem Relation Age of Onset    Stroke Mother     Heart Disease Father     Cancer Maternal Grandmother      Current Outpatient Prescriptions   Medication Sig Dispense Refill    potassium chloride SA (MICRO-K) 10 mEq capsule Take 1 Cap by mouth daily. 30 Cap 0    calcium carbonate (CALCIUM 500) 500 mg calcium (1,250 mg) chewable tablet Take 1 Tab by mouth two (2) times a day. Indications: hypocalcemia 60 Tab 1    dexamethasone (DECADRON) 4 mg tablet Take 8 mg once po daily the day before Chemotherapy, the day of chemo and the day after chemo 24 Tab 3    prochlorperazine (COMPAZINE) 10 mg tablet Take one tab po every 6 hours with benadryl 25 mg starting the night of chemo and for three days then PRN 60 Tab 2    lidocaine-prilocaine (EMLA) topical cream Apply a small amount to port area one hour prior to chemo and cover with saran wrap 30 g 3    warfarin (COUMADIN) 1 mg tablet Take one tab po daily at 6 pm or after 30 Tab 6    ondansetron hcl (ZOFRAN, AS HYDROCHLORIDE,) 8 mg tablet Take one tab every 8 hours for nausea starting the night of chemo and for three days 30 Tab 3    HYDROcodone-acetaminophen (NORCO) 5-325 mg per tablet Take 1 Tab by mouth every four (4) hours as needed for Pain. Max Daily Amount: 6 Tabs. 30 Tab 0    docusate sodium (COLACE) 100 mg capsule Take 1 Cap by mouth two (2) times a day for 90 days. 60 Cap 2    cephALEXin (KEFLEX) 250 mg capsule Take 250 mg by mouth two (2) times a day.  famotidine (PEPCID) 40 mg tablet 40 mg.      sucralfate (CARAFATE) 1 gram tablet 1 g.        Facility-Administered Medications Ordered in Other Visits   Medication Dose Route Frequency Provider Last Rate Last Dose    [START ON 4/24/2018] dexamethasone (DECADRON) 12 mg in 0.9% sodium chloride 50 mL IVPB  12 mg IntraVENous ONCE Dai Garrido MD        [START ON 4/24/2018] fosaprepitant (EMEND) 150 mg in 0.9% sodium chloride 150 mL IVPB  150 mg IntraVENous ONCE MD Nieves Machado ON 4/24/2018] pegfilgrastim (NEULASTA) wearable SQ injector 6 mg  6 mg SubCUTAneous ONCE Dai Garrido MD        [START ON 4/24/2018] CARBOplatin (PARAPLATIN) 540 mg in 0.9% sodium chloride 250 mL chemo infusion  540 mg IntraVENous ONCE Anthony Mckeon MD        [START ON 4/24/2018] DOCEtaxel (TAXOTERE) 120 mg in 0.9% sodium chloride 250 mL chemo infusion  120 mg IntraVENous ONCE Anthony Mckeon MD       [facility] [START ON 4/24/2018] pertuzumab (PERJETA) 420 mg in 0.9% sodium chloride 250 mL IVPB  420 mg IntraVENous Jeancarlos Cochran MD       [facility] Gary Oh ON 4/24/2018] trastuzumab (HERCEPTIN) 320 mg in 0.9% sodium chloride 250 mL IVPB  320 mg IntraVENous Jeancarlos Cochran MD           Review of Systems  Constitutional: The patient has complaints of a mild degree of fatigue and some weakness following chemotherapy. She has a noticeable increase in her food intake due to increasing appetite following chemotherapy. HEENT: The patient denies recent head trauma, eye pain, blurred vision,  hearing deficit, oropharyngeal mucosal pain or lesions, and the patient denies throat pain or discomfort. Lymphatics: The patient denies palpable peripheral lymphadenopathy. Hematologic: The patient denies having bruising, bleeding, or progressive fatigue. Respiratory: Patient denies having shortness of breath, cough, sputum production, fever, or dyspnea on exertion. Cardiovascular: The patient denies having leg pain, leg swelling, heart palpitations, chest permit, chest pain, or lightheadedness. The patient denies having dyspnea on exertion. Gastrointestinal: The patient denies having nausea, emesis, or diarrhea. The patient denies having any hematemesis or blood in the stool. Genitourinary: Patient denies having urinary urgency, frequency, or dysuria. The patient denies having blood in the urine. Psychological: The patient denies having symptoms of nervousness, anxiety, depression, or thoughts of harming self. Skin: Patient denies having skin rashes, skin, ulcerations, or unexplained itching or pruritus.   Musculoskeletal: The patient denies having pain in the joints or bones.      Objective:     Visit Vitals    /75    Pulse 86    Temp 97.5 °F (36.4 °C) (Oral)    Wt 51.3 kg (113 lb)    BMI 18.83 kg/m2     ECOG PS=0    Physical Exam:   Gen. Appearance: The patient is in no acute distress. Skin: There is no bruise or rash. HEENT: The exam is unremarkable. Neck: Supple without lymphadenopathy or thyromegaly. Lungs: Clear to auscultation and percussion; there are no wheezes or rhonchi. Heart: Regular rate and rhythm; there are no murmurs, gallops, or rubs. Anterior chest wall and breast: There is no change in the exam.  Abdomen: Bowel sounds are present and normal.  There is no guarding, tenderness, or hepatosplenomegaly. Extremities: There is no clubbing, cyanosis, or edema. Neurologic: There are no focal neurologic deficits. Lymphatics: There is no palpable peripheral lymphadenopathy. Musculoskeletal: The patient has full range of motion at all joints. There is no evidence of joint deformity or effusions. There is no focal joint tenderness. Psychological/psychiatric: There is no clinical evidence of anxiety, depression, or melancholy. Lab data:      Results for orders placed or performed during the hospital encounter of 04/18/18   CBC WITH 3 PART DIFF     Status: Abnormal   Result Value Ref Range Status    WBC 17.4 (H) 4.5 - 13.0 K/uL Final    RBC 3.51 (L) 4.10 - 5.10 M/uL Final    HGB 10.7 (L) 12.0 - 16 g/dL Final    HCT 31.8 (L) 36 - 48 % Final    MCV 90.6 78 - 102 FL Final    MCH 30.5 25.0 - 35.0 PG Final    MCHC 33.6 31 - 37 g/dL Final    RDW 14.0 11.5 - 14.5 % Final    PLATELET 262 494 - 727 K/uL Final    NEUTROPHILS 74 (H) 40 - 70 % Final    MIXED CELLS 5 0.1 - 17 % Final    LYMPHOCYTES 21 14 - 44 % Final    ABS. NEUTROPHILS 13.0 (H) 1.8 - 9.5 K/UL Final    ABS. MIXED CELLS 0.8 0.0 - 2.3 K/uL Final    ABS. LYMPHOCYTES 3.6 1.1 - 5.9 K/UL Final     Comment: Test performed at [place] [address] Location. Results Reviewed by Medical Director.     WINNIE AUTOMATED   Final           Assessment:     1. Malignant neoplasm of upper-outer quadrant of right breast in female, estrogen receptor negative ([facility] [address]    2. Antineoplastic chemotherapy induced anemia    3. Fatigue due to treatment      Plan:   Invasive ductal adenocarcinoma right upper quadrant of the breast: Cycle 2 of her treatment program with carboplatin based regimen and Herceptin based regimen will be given on 4/24/2018. Chemotherapy-induced anemia: I will check her iron profile and ferritin levels at this time. I have instructed the patient to begin taking Slow Fe 1 tablet daily. Fatigue due to treatment: I have advised the patient to get plan to rest and to stay well-hydrated particularly to know weeks of chemotherapy. Follow-up in 3 weeks  Orders Placed This Encounter    COMPLETE CBC & AUTO DIFF WBC    InHouse CBC ([place])     Standing Status:   Future     Number of Occurrences:   1     Standing Expiration Date:   8/91/1416    METABOLIC PANEL, COMPREHENSIVE     Standing Status:   Future     Number of Occurrences:   1     Standing Expiration Date:   4/19/2019    IRON PROFILE     Standing Status:   Future     Number of Occurrences:   1     Standing Expiration Date:   4/19/2019    FERRITIN     Standing Status:   Future     Number of Occurrences:   1     Standing Expiration Date:   4/19/2019       Chris De La Vega MD  4/18/2018      Please note: This document has been produced using voice recognition software. Unrecognized errors in transcription may be present.

## 2018-04-18 NOTE — MR AVS SNAPSHOT
[address] [facility] [facility] [address] 
[phone] Patient: Boy Alejandro MRN: [id number] Visit Information Date & Time Provider Department Dept. Phone Encounter #  
 4/18/2018  9:15 AM Frances Santiago MD [facility] Oncology [phone] [id number] Follow-up Instructions Return in about 3 weeks (around 5/9/2018). Your Appointments 5/9/2018 10:30 AM  
Office Visit with Frances Santiago MD  
[address] Oncology [facility]) Appt Note: 3 weeks [facility] [address], [place] [address], [address] [place] [address] 200 [facility] 7/13/2018  2:00 PM  
Follow Up with [facility] ([facility]) Appt Note: 4 month f/up [address] [facility] [address] [facility] Upcoming Health Maintenance Date Due Pneumococcal 19-64 Highest Risk (1 of 3 - PCV13) 9/5/1979 DTaP/Tdap/Td series (1 - Tdap) 9/5/1981 PAP AKA CERVICAL CYTOLOGY 9/5/1981 BREAST CANCER SCRN MAMMOGRAM 9/5/2010 FOBT Q 1 YEAR AGE 50-75 9/5/2010 Influenza Age 5 to Adult 8/1/2017 Allergies as of 4/18/2018  Review Complete On: 4/18/2018 By: Frances Santiago MD  
 No Known Allergies Current Immunizations  Reviewed on 4/3/2018 No immunizations on file. Not reviewed this visit You Were Diagnosed With   
  
 Codes Comments Malignant neoplasm of upper-outer quadrant of right breast in female, estrogen receptor negative ([facility] [address]    -  Primary ICD-10-CM: C50.411, Z17.1 ICD-9-CM: 174.4, V86.1 Antineoplastic chemotherapy induced anemia     ICD-10-CM: D64.81, T45.1X5A 
ICD-9-CM: 285.3, E933.1 Vitals BP Pulse Temp Weight(growth percentile) BMI OB Status 125/75 86 97.5 °F (36.4 °C) (Oral) 113 lb (51.3 kg) 18.83 kg/m2 Hysterectomy Smoking Status Never Smoker BMI and BSA Data Body Mass Index Body Surface Area  
 18.83 kg/m 2 1.53 m 2 Preferred Pharmacy Pharmacy Name Phone [facility], [address] [place] [phone] Your Updated Medication List  
  
   
This list is accurate as of 4/18/18 11:34 AM.  Always use your most recent med list.  
  
  
  
  
 dexamethasone 4 mg tablet Commonly known as:  DECADRON Take 8 mg once po daily the day before Chemotherapy, the day of chemo and the day after chemo  
  
 docusate sodium 100 mg capsule Commonly known as:  Chiquita Silk Take 1 Cap by mouth two (2) times a day for 90 days. famotidine 40 mg tablet Commonly known as:  PEPCID  
40 mg. HYDROcodone-acetaminophen 5-325 mg per tablet Commonly known as:  [place] Laity Take 1 Tab by mouth every four (4) hours as needed for Pain. Max Daily Amount: 6 Tabs. KEFLEX 250 mg capsule Generic drug:  cephALEXin Take 250 mg by mouth two (2) times a day. lidocaine-prilocaine topical cream  
Commonly known as:  EMLA Apply a small amount to port area one hour prior to chemo and cover with saran wrap  
  
 ondansetron hcl 8 mg tablet Commonly known as:  ZOFRAN (AS HYDROCHLORIDE) Take one tab every 8 hours for nausea starting the night of chemo and for three days  
  
 prochlorperazine 10 mg tablet Commonly known as:  COMPAZINE Take one tab po every 6 hours with benadryl 25 mg starting the night of chemo and for three days then PRN  
  
 sucralfate 1 gram tablet Commonly known as:  CARAFATE  
1 g.  
  
 warfarin 1 mg tablet Commonly known as:  COUMADIN Take one tab po daily at 6 pm or after We Performed the Following COMPLETE CBC & AUTO DIFF WBC [01364 CPT(R)] Follow-up Instructions Return in about 3 weeks (around 5/9/2018). To-Do List   
 04/18/2018 Lab:  CBC WITH 3 PART DIFF   
  
 04/24/2018  8:00 AM  
  Appointment with HBV INFUSION NURSE 4 at [facility] OP INFUSION ([phone]) Introducing [facility]! [facility] introduces [facility]Now patient portal. Now you can access parts of your medical record, email your doctor's office, and request medication refills online. 1. In your internet browser, go to [email or web address] 2. Click on the First Time User? Click Here link in the Sign In box. You will see the New Member Sign Up page. 3. Enter your CupomNow Access Code exactly as it appears below. You will not need to use this code after youve completed the sign-up process. If you do not sign up before the expiration date, you must request a new code. · CupomNow Access Code: [email or web address] Expires: 5/14/2018 11:01 AM 
 
4. Enter the last four digits of your Social Security Number (xxxx) and Date of Birth (mm/dd/yyyy) as indicated and click Submit. You will be taken to the next sign-up page. 5. Create a CupomNow ID. This will be your CupomNow login ID and cannot be changed, so think of one that is secure and easy to remember. 6. Create a CupomNow password. You can change your password at any time. 7. Enter your Password Reset Question and Answer. This can be used at a later time if you forget your password. 8. Enter your e-mail address. You will receive e-mail notification when new information is available in [address] 9. Click Sign Up. You can now view and download portions of your medical record. 10. Click the Download Summary menu link to download a portable copy of your medical information. If you have questions, please visit the Frequently Asked Questions section of the CupomNow website. Remember, CupomNow is NOT to be used for urgent needs. For medical emergencies, dial 911. Now available from your iPhone and Android! Please provide this summary of care documentation to your next provider. Your primary care clinician is listed as Julia Yeh. If you have any questions after today's visit, please call [phone].

## 2018-04-19 DIAGNOSIS — E87.6 HYPOKALEMIA: Primary | ICD-10-CM

## 2018-04-19 RX ORDER — POTASSIUM CHLORIDE 750 MG/1
10 CAPSULE, EXTENDED RELEASE ORAL DAILY
Qty: 30 CAP | Refills: 0 | Status: SHIPPED | OUTPATIENT
Start: 2018-04-19 | End: 2018-05-16 | Stop reason: SDUPTHER

## 2018-04-19 RX ORDER — UREA 10 %
1 LOTION (ML) TOPICAL 2 TIMES DAILY
Qty: 60 TAB | Refills: 1 | Status: SHIPPED | OUTPATIENT
Start: 2018-04-19 | End: 2018-10-16

## 2018-04-20 ENCOUNTER — APPOINTMENT (OUTPATIENT)
Dept: INFUSION THERAPY | Age: 58
End: 2018-04-20
Payer: MEDICAID

## 2018-04-24 ENCOUNTER — HOSPITAL ENCOUNTER (OUTPATIENT)
Dept: INFUSION THERAPY | Age: 58
Discharge: HOME | End: 2018-04-24
Payer: MEDICAID

## 2018-04-24 VITALS
RESPIRATION RATE: 18 BRPM | BODY MASS INDEX: 17.99 KG/M2 | HEART RATE: 89 BPM | TEMPERATURE: 98.3 F | OXYGEN SATURATION: 100 % | SYSTOLIC BLOOD PRESSURE: 100 MMHG | WEIGHT: 108 LBS | HEIGHT: 65 IN | DIASTOLIC BLOOD PRESSURE: 55 MMHG

## 2018-04-24 LAB
ALBUMIN SERPL-MCNC: 2.8 G/DL (ref 3.4–5)
ALBUMIN/GLOB SERPL: 0.8 {RATIO} (ref 0.8–1.7)
ALP SERPL-CCNC: 59 U/L (ref 45–117)
ALT SERPL-CCNC: 23 U/L (ref 13–56)
ANION GAP BLD CALC-SCNC: 15 MMOL/L (ref 10–20)
AST SERPL-CCNC: 17 U/L (ref 15–37)
BASO+EOS+MONOS # BLD AUTO: 0.8 K/UL (ref 0–2.3)
BASO+EOS+MONOS # BLD AUTO: 6 % (ref 0.1–17)
BILIRUB DIRECT SERPL-MCNC: <0.1 MG/DL (ref 0–0.2)
BILIRUB SERPL-MCNC: 0.2 MG/DL (ref 0.2–1)
BUN BLD-MCNC: 9 MG/DL (ref 7–18)
CA-I BLD-MCNC: 1.13 MMOL/L (ref 1.12–1.32)
CHLORIDE BLD-SCNC: 101 MMOL/L (ref 100–108)
CO2 BLD-SCNC: 22 MMOL/L (ref 19–24)
CREAT UR-MCNC: 0.6 MG/DL (ref 0.6–1.3)
DIFFERENTIAL METHOD BLD: ABNORMAL
ERYTHROCYTE [DISTWIDTH] IN BLOOD BY AUTOMATED COUNT: 14.1 % (ref 11.5–14.5)
GLOBULIN SER CALC-MCNC: 3.4 G/DL (ref 2–4)
GLUCOSE BLD STRIP.AUTO-MCNC: 91 MG/DL (ref 74–106)
HCT VFR BLD AUTO: 29.9 % (ref 36–48)
HCT VFR BLD CALC: 32 % (ref 36–49)
HGB BLD-MCNC: 10.1 G/DL (ref 12–16)
HGB BLD-MCNC: 10.9 G/DL (ref 12–16)
LYMPHOCYTES # BLD: 1.1 K/UL (ref 1.1–5.9)
LYMPHOCYTES NFR BLD: 8 % (ref 14–44)
MCH RBC QN AUTO: 30.6 PG (ref 25–35)
MCHC RBC AUTO-ENTMCNC: 33.8 G/DL (ref 31–37)
MCV RBC AUTO: 90.6 FL (ref 78–102)
NEUTS SEG # BLD: 11.8 K/UL (ref 1.8–9.5)
NEUTS SEG NFR BLD: 86 % (ref 40–70)
PLATELET # BLD AUTO: 216 K/UL (ref 140–440)
POTASSIUM BLD-SCNC: 3.9 MMOL/L (ref 3.5–5.5)
PROT SERPL-MCNC: 6.2 G/DL (ref 6.4–8.2)
RBC # BLD AUTO: 3.3 M/UL (ref 4.1–5.1)
SODIUM BLD-SCNC: 134 MMOL/L (ref 136–145)
WBC # BLD AUTO: 13.7 K/UL (ref 4.5–13)

## 2018-04-24 PROCEDURE — 74011250636 HC RX REV CODE- 250/636: Performed by: NURSE PRACTITIONER

## 2018-04-24 PROCEDURE — 96413 CHEMO IV INFUSION 1 HR: CPT

## 2018-04-24 PROCEDURE — 96375 TX/PRO/DX INJ NEW DRUG ADDON: CPT

## 2018-04-24 PROCEDURE — 74011250636 HC RX REV CODE- 250/636

## 2018-04-24 PROCEDURE — 96368 THER/DIAG CONCURRENT INF: CPT

## 2018-04-24 PROCEDURE — 36415 COLL VENOUS BLD VENIPUNCTURE: CPT

## 2018-04-24 PROCEDURE — 99211 OFF/OP EST MAY X REQ PHY/QHP: CPT

## 2018-04-24 PROCEDURE — 80076 HEPATIC FUNCTION PANEL: CPT | Performed by: INTERNAL MEDICINE

## 2018-04-24 PROCEDURE — 74011250636 HC RX REV CODE- 250/636: Performed by: INTERNAL MEDICINE

## 2018-04-24 PROCEDURE — 96367 TX/PROPH/DG ADDL SEQ IV INF: CPT

## 2018-04-24 PROCEDURE — 74011000258 HC RX REV CODE- 258: Performed by: INTERNAL MEDICINE

## 2018-04-24 PROCEDURE — 96417 CHEMO IV INFUS EACH ADDL SEQ: CPT

## 2018-04-24 PROCEDURE — 36593 DECLOT VASCULAR DEVICE: CPT

## 2018-04-24 PROCEDURE — 77030012965 HC NDL HUBR BBMI -A

## 2018-04-24 PROCEDURE — 80047 BASIC METABLC PNL IONIZED CA: CPT

## 2018-04-24 PROCEDURE — 74011000250 HC RX REV CODE- 250: Performed by: NURSE PRACTITIONER

## 2018-04-24 PROCEDURE — 96377 APPLICATON ON-BODY INJECTOR: CPT

## 2018-04-24 PROCEDURE — 85025 COMPLETE CBC W/AUTO DIFF WBC: CPT | Performed by: INTERNAL MEDICINE

## 2018-04-24 RX ORDER — PALONOSETRON 0.05 MG/ML
0.25 INJECTION, SOLUTION INTRAVENOUS ONCE
Status: COMPLETED | OUTPATIENT
Start: 2018-04-24 | End: 2018-04-24

## 2018-04-24 RX ORDER — SODIUM CHLORIDE 0.9 % (FLUSH) 0.9 %
10-40 SYRINGE (ML) INJECTION AS NEEDED
Status: DISCONTINUED | OUTPATIENT
Start: 2018-04-24 | End: 2018-04-28 | Stop reason: HOSPADM

## 2018-04-24 RX ORDER — HEPARIN SODIUM (PORCINE) LOCK FLUSH IV SOLN 100 UNIT/ML 100 UNIT/ML
500 SOLUTION INTRAVENOUS AS NEEDED
Status: DISCONTINUED | OUTPATIENT
Start: 2018-04-24 | End: 2018-04-24 | Stop reason: CLARIF

## 2018-04-24 RX ORDER — SODIUM CHLORIDE 9 MG/ML
50 INJECTION, SOLUTION INTRAVENOUS CONTINUOUS
Status: DISPENSED | OUTPATIENT
Start: 2018-04-24 | End: 2018-04-25

## 2018-04-24 RX ORDER — HEPARIN 100 UNIT/ML
500 SYRINGE INTRAVENOUS AS NEEDED
Status: DISPENSED | OUTPATIENT
Start: 2018-04-24 | End: 2018-04-24

## 2018-04-24 RX ADMIN — CARBOPLATIN 540 MG: 10 INJECTION, SOLUTION INTRAVENOUS at 15:02

## 2018-04-24 RX ADMIN — PALONOSETRON HYDROCHLORIDE 0.25 MG: 0.25 INJECTION INTRAVENOUS at 11:00

## 2018-04-24 RX ADMIN — Medication 500 UNITS: at 16:09

## 2018-04-24 RX ADMIN — SODIUM CHLORIDE 50 ML/HR: 900 INJECTION, SOLUTION INTRAVENOUS at 10:20

## 2018-04-24 RX ADMIN — PEGFILGRASTIM 6 MG: KIT SUBCUTANEOUS at 16:06

## 2018-04-24 RX ADMIN — SODIUM CHLORIDE 150 MG: 900 INJECTION, SOLUTION INTRAVENOUS at 10:20

## 2018-04-24 RX ADMIN — TRASTUZUMAB 320 MG: 150 INJECTION, POWDER, LYOPHILIZED, FOR SOLUTION INTRAVENOUS at 11:46

## 2018-04-24 RX ADMIN — Medication 40 ML: at 15:10

## 2018-04-24 RX ADMIN — PERTUZUMAB 420 MG: 30 INJECTION, SOLUTION, CONCENTRATE INTRAVENOUS at 12:22

## 2018-04-24 RX ADMIN — Medication 40 ML: at 08:55

## 2018-04-24 RX ADMIN — Medication 40 ML: at 08:50

## 2018-04-24 RX ADMIN — ALTEPLASE 2 MG: 2.2 INJECTION, POWDER, LYOPHILIZED, FOR SOLUTION INTRAVENOUS at 09:20

## 2018-04-24 RX ADMIN — WATER 2 MG: 1 INJECTION INTRAMUSCULAR; INTRAVENOUS; SUBCUTANEOUS at 09:17

## 2018-04-24 RX ADMIN — DOCETAXEL ANHYDROUS 120 MG: 10 INJECTION, SOLUTION INTRAVENOUS at 13:30

## 2018-04-24 RX ADMIN — Medication 500 UNITS: at 16:11

## 2018-04-24 RX ADMIN — DEXAMETHASONE SODIUM PHOSPHATE 12 MG: 4 INJECTION, SOLUTION INTRA-ARTICULAR; INTRALESIONAL; INTRAMUSCULAR; INTRAVENOUS; SOFT TISSUE at 11:06

## 2018-04-24 NOTE — PROGRESS NOTES
[facility] Progress Note    Date: 2018    Name: Inderjit Hoff              MRN: [id number]              : 1960    Chemotherapy Cycle: C2 of 6  Hecptin/Perjeta/Taxol/Carboplatin       Ms. Steffanie Coyle was assessed and education was provided. Denies any problems with previous infusion. Ms. Ventura's vitals were reviewed. Visit Vitals    /75 (BP 1 Location: Right arm, BP Patient Position: Sitting)    Pulse 100    Temp 98.3 °F (36.8 °C)    Resp 18    Ht 5' 5\" (1.651 m)    Wt 49 kg (108 lb)    SpO2 100%    BMI 17.97 kg/m2     Labs drawn from [facility] [place] w/23 gauge butterfly needle w/o difficulty. No irritation noted at site, 2x2 gauze and Coban applied. Lab results were obtained and reviewed. Recent Results (from the past 12 hour(s))   CBC WITH 3 PART DIFF    Collection Time: 18  9:05 AM   Result Value Ref Range    WBC 13.7 (H) 4.5 - 13.0 K/uL    RBC 3.30 (L) 4.10 - 5.10 M/uL    HGB 10.1 (L) 12.0 - 16 g/dL    HCT 29.9 (L) 36 - 48 %    MCV 90.6 78 - 102 FL    MCH 30.6 25.0 - 35.0 PG    MCHC 33.8 31 - 37 g/dL    RDW 14.1 11.5 - 14.5 %    PLATELET 701 540 - 648 K/uL    NEUTROPHILS 86 (H) 40 - 70 %    MIXED CELLS 6 0.1 - 17 %    LYMPHOCYTES 8 (L) 14 - 44 %    ABS. NEUTROPHILS 11.8 (H) 1.8 - 9.5 K/UL    ABS. MIXED CELLS 0.8 0.0 - 2.3 K/uL    ABS. LYMPHOCYTES 1.1 1.1 - 5.9 K/UL    DF AUTOMATED     HEPATIC FUNCTION PANEL    Collection Time: 18  9:05 AM   Result Value Ref Range    Protein, total 6.2 (L) 6.4 - 8.2 g/dL    Albumin 2.8 (L) 3.4 - 5.0 g/dL    Globulin 3.4 2.0 - 4.0 g/dL    A-G Ratio 0.8 0.8 - 1.7      Bilirubin, total 0.2 0.2 - 1.0 MG/DL    Bilirubin, direct <0.1 0.0 - 0.2 MG/DL    Alk.  phosphatase 59 45 - 117 U/L    AST (SGOT) 17 15 - 37 U/L    ALT (SGPT) 23 13 - 56 U/L   POC CHEM8    Collection Time: 18  9:11 AM   Result Value Ref Range    CO2, POC 22 19 - 24 MMOL/L    Glucose, POC 91 74 - 106 MG/DL    BUN, POC 9 7 - 18 MG/DL    Creatinine, POC 0.6 0.6 - 1.3 MG/DL    GFRAA, POC >60 >60 ml/min/1.73m2    GFRNA, POC >60 >60 ml/min/1.73m2    Sodium,  (L) 136 - 145 MMOL/L    Potassium, POC 3.9 3.5 - 5.5 MMOL/L    Calcium, ionized (POC) 1.13 1.12 - 1.32 mmol/L    Chloride,  100 - 108 MMOL/L    Anion gap, POC 15 10 - 20      Hematocrit, POC 32 (L) 36 - 49 %    Hemoglobin, POC 10.9 (L) 12 - 16 G/DL     Dual Mediport at left upper chest assessed. Lateral site accessed with 20 gauge, 3/4 inch Branch needle, flushes easily w/2=10 ml NS, no blood return obtained. De-accessed. Medial site accessed with 20 gauge, 1 inch Branch needle w/o difficulty, flushed easily w/20 ml NS, needle re-positioned w/o success, de-accessed. Both sites re-accessed with 20 gauge, 1 inch Branch needles w/o difficulty. Patient placed in reclined position, still unable to obtained blood return. Cath Carlitos 2 mg/2.2 ml sterile water was injected into each site and left in place for 30 minutes. Good blood return obtained from medial site, scant from lateral.  Medial site flushed with 10 ml NS after 5 ml discard. 250 ml Normal Saline infusing at 25 ml/hr. Pre-medications of Emend 150 mg/150 ml IVPB, Aloxi 0.25 mg IVP, Dexamethasone 12 mg/50 ml IVPB were administered as ordered and chemotherapy was initiated. Herceptin 320 mg/290 ml was infused at 580 ml/hr. No s/s reaction noted. Perjeta 420 mg/289 ml was infused at 578 ml/hr. Observed for 30 minutes post infusion, no s/s reaction noted. Docetaxol 120 mg/282 ml was infused at 282 ml/hr. No s/s reaction noted. Carboplatin 540 mg/329 ml was infused at 329 ml/hr. No s/s reaction noted. Each site of Mediport flushed with 20 ml NS and 5 ml of 100 units/ml Heparin, and de-accessed. Good blood return noted from Medial site, no blood return from lateral site. No irritation noted at sites, band aids applied. Neulasta 6 mg On Body Injector was applied at back of upper right arm at 1606, and will inject at approximately 1906 on 4/25/2018. Patient verbalized understanding of care, removal and disposal of OBI. Ms. Malissa Mast tolerated infusion, and had no complaints at this time. Armband removed and shredded. Ms. Malissa Mast was discharged from [place] [address] in stable condition at 1615. She is to return on 5/15/2018 at 0800 for her next appointment for labs and chemotherapy.     Janett Vega RN  April 24, 2018  11:10 AM

## 2018-05-14 ENCOUNTER — HOSPITAL ENCOUNTER (OUTPATIENT)
Dept: LAB | Age: 58
Discharge: HOME | End: 2018-05-14
Payer: MEDICAID

## 2018-05-14 ENCOUNTER — OFFICE VISIT (OUTPATIENT)
Dept: ONCOLOGY | Age: 58
End: 2018-05-14

## 2018-05-14 ENCOUNTER — HOSPITAL ENCOUNTER (OUTPATIENT)
Dept: ONCOLOGY | Age: 58
Discharge: HOME | End: 2018-05-14

## 2018-05-14 VITALS
TEMPERATURE: 98 F | DIASTOLIC BLOOD PRESSURE: 66 MMHG | HEART RATE: 95 BPM | BODY MASS INDEX: 18.47 KG/M2 | WEIGHT: 111 LBS | SYSTOLIC BLOOD PRESSURE: 110 MMHG

## 2018-05-14 DIAGNOSIS — Z17.1 MALIGNANT NEOPLASM OF UPPER-OUTER QUADRANT OF RIGHT BREAST IN FEMALE, ESTROGEN RECEPTOR NEGATIVE (HCC): Primary | ICD-10-CM

## 2018-05-14 DIAGNOSIS — Z17.1 MALIGNANT NEOPLASM OF UPPER-OUTER QUADRANT OF RIGHT BREAST IN FEMALE, ESTROGEN RECEPTOR NEGATIVE (HCC): ICD-10-CM

## 2018-05-14 DIAGNOSIS — R53.83 FATIGUE DUE TO TREATMENT: ICD-10-CM

## 2018-05-14 DIAGNOSIS — D64.81 ANTINEOPLASTIC CHEMOTHERAPY INDUCED ANEMIA: ICD-10-CM

## 2018-05-14 DIAGNOSIS — C50.411 MALIGNANT NEOPLASM OF UPPER-OUTER QUADRANT OF RIGHT BREAST IN FEMALE, ESTROGEN RECEPTOR NEGATIVE (HCC): ICD-10-CM

## 2018-05-14 DIAGNOSIS — T45.1X5A ANTINEOPLASTIC CHEMOTHERAPY INDUCED ANEMIA: ICD-10-CM

## 2018-05-14 DIAGNOSIS — C50.411 MALIGNANT NEOPLASM OF UPPER-OUTER QUADRANT OF RIGHT BREAST IN FEMALE, ESTROGEN RECEPTOR NEGATIVE (HCC): Primary | ICD-10-CM

## 2018-05-14 LAB
ALBUMIN SERPL-MCNC: 2.8 G/DL (ref 3.4–5)
ALBUMIN/GLOB SERPL: 0.8 {RATIO} (ref 0.8–1.7)
ALP SERPL-CCNC: 65 U/L (ref 45–117)
ALT SERPL-CCNC: 18 U/L (ref 13–56)
ANION GAP SERPL CALC-SCNC: 8 MMOL/L (ref 3–18)
AST SERPL-CCNC: 18 U/L (ref 15–37)
BASO+EOS+MONOS # BLD AUTO: 0.4 K/UL (ref 0–2.3)
BASO+EOS+MONOS # BLD AUTO: 7 % (ref 0.1–17)
BILIRUB SERPL-MCNC: 0.2 MG/DL (ref 0.2–1)
BUN SERPL-MCNC: 7 MG/DL (ref 7–18)
BUN/CREAT SERPL: 11 (ref 12–20)
CALCIUM SERPL-MCNC: 7.6 MG/DL (ref 8.5–10.1)
CHLORIDE SERPL-SCNC: 109 MMOL/L (ref 100–108)
CO2 SERPL-SCNC: 24 MMOL/L (ref 21–32)
CREAT SERPL-MCNC: 0.62 MG/DL (ref 0.6–1.3)
DIFFERENTIAL METHOD BLD: ABNORMAL
ERYTHROCYTE [DISTWIDTH] IN BLOOD BY AUTOMATED COUNT: 16.2 % (ref 11.5–14.5)
FERRITIN SERPL-MCNC: 178 NG/ML (ref 8–388)
GLOBULIN SER CALC-MCNC: 3.5 G/DL (ref 2–4)
GLUCOSE SERPL-MCNC: 97 MG/DL (ref 74–99)
HCT VFR BLD AUTO: 28.4 % (ref 36–48)
HGB BLD-MCNC: 9.6 G/DL (ref 12–16)
IRON SATN MFR SERPL: 24 %
IRON SERPL-MCNC: 56 UG/DL (ref 50–175)
LYMPHOCYTES # BLD: 1.6 K/UL (ref 1.1–5.9)
LYMPHOCYTES NFR BLD: 28 % (ref 14–44)
MCH RBC QN AUTO: 31.7 PG (ref 25–35)
MCHC RBC AUTO-ENTMCNC: 33.8 G/DL (ref 31–37)
MCV RBC AUTO: 93.7 FL (ref 78–102)
NEUTS SEG # BLD: 3.9 K/UL (ref 1.8–9.5)
NEUTS SEG NFR BLD: 65 % (ref 40–70)
PLATELET # BLD AUTO: 216 K/UL (ref 140–440)
POTASSIUM SERPL-SCNC: 3.7 MMOL/L (ref 3.5–5.5)
PROT SERPL-MCNC: 6.3 G/DL (ref 6.4–8.2)
RBC # BLD AUTO: 3.03 M/UL (ref 4.1–5.1)
SODIUM SERPL-SCNC: 141 MMOL/L (ref 136–145)
TIBC SERPL-MCNC: 233 UG/DL (ref 250–450)
WBC # BLD AUTO: 5.9 K/UL (ref 4.5–13)

## 2018-05-14 PROCEDURE — 80053 COMPREHEN METABOLIC PANEL: CPT | Performed by: NURSE PRACTITIONER

## 2018-05-14 PROCEDURE — 86300 IMMUNOASSAY TUMOR CA 15-3: CPT | Performed by: NURSE PRACTITIONER

## 2018-05-14 PROCEDURE — 82728 ASSAY OF FERRITIN: CPT | Performed by: NURSE PRACTITIONER

## 2018-05-14 PROCEDURE — 36415 COLL VENOUS BLD VENIPUNCTURE: CPT | Performed by: NURSE PRACTITIONER

## 2018-05-14 PROCEDURE — 83540 ASSAY OF IRON: CPT | Performed by: NURSE PRACTITIONER

## 2018-05-14 NOTE — PROGRESS NOTES
Hematology/Oncology  Progress Note    Name: Oh Brady  Date: 2018  : 1960    PCP: Nubia Purvis MD     Ms. Mandy Mi is a 62 y.o. -American woman with HER-2 positive invasive ductal adenocarcinoma the right breast    Current therapy: Carboplatin, Taxotere, and Herceptin plus Perjeda. (Cycle 3 will be started on 5/15/2018)    Subjective:     Ms. Mandy Mi is a 55-year-old -American woman who is currently receiving neoadjuvant systemic therapy for her HER-2 positive invasive ductal adenocarcinoma the right breast.  On 2018 she completed cycle 2 of her treatment. The patient reports that she tolerated the medication well. She did not experience any significant nausea or emesis. She has experienced a mild degree of fatigue. Otherwise she has no additional complaints or concerns to report. Past medical history, family history, and social history: these were reviewed and remains unchanged. Past Medical History:   Diagnosis Date    Cancer [facility])     right breast    Hepatitis B     Tuberculosis      Past Surgical History:   Procedure Laterality Date    HX GYN      hysterectomy     HX OTHER SURGICAL      neck surgery     IN [facility] CTR VAD W/SUBQ PORT AGE 5 YR/> N/A 2018    Dr. Younger [place] History     Social History    Marital status: SINGLE     Spouse name: N/A    Number of children: N/A    Years of education: N/A     Occupational History    Not on file.      Social History Main Topics    Smoking status: Never Smoker    Smokeless tobacco: Current User    Alcohol use 1.2 oz/week     2 Cans of beer per week      Comment: weekly     Drug use: No    Sexual activity: No     Other Topics Concern    Not on file     Social History Narrative     Family History   Problem Relation Age of Onset    Stroke Mother     Heart Disease Father     Cancer Maternal Grandmother      Current Outpatient Prescriptions   Medication Sig Dispense Refill    potassium chloride SA (MICRO-K) 10 mEq capsule Take 1 Cap by mouth daily. 30 Cap 0    calcium carbonate (CALCIUM 500) 500 mg calcium (1,250 mg) chewable tablet Take 1 Tab by mouth two (2) times a day. Indications: hypocalcemia 60 Tab 1    dexamethasone (DECADRON) 4 mg tablet Take 8 mg once po daily the day before Chemotherapy, the day of chemo and the day after chemo 24 Tab 3    prochlorperazine (COMPAZINE) 10 mg tablet Take one tab po every 6 hours with benadryl 25 mg starting the night of chemo and for three days then PRN 60 Tab 2    lidocaine-prilocaine (EMLA) topical cream Apply a small amount to port area one hour prior to chemo and cover with saran wrap 30 g 3    warfarin (COUMADIN) 1 mg tablet Take one tab po daily at 6 pm or after 30 Tab 6    ondansetron hcl (ZOFRAN, AS HYDROCHLORIDE,) 8 mg tablet Take one tab every 8 hours for nausea starting the night of chemo and for three days 30 Tab 3    HYDROcodone-acetaminophen (NORCO) 5-325 mg per tablet Take 1 Tab by mouth every four (4) hours as needed for Pain. Max Daily Amount: 6 Tabs. 30 Tab 0    docusate sodium (COLACE) 100 mg capsule Take 1 Cap by mouth two (2) times a day for 90 days. 60 Cap 2    cephALEXin (KEFLEX) 250 mg capsule Take 250 mg by mouth two (2) times a day.  famotidine (PEPCID) 40 mg tablet 40 mg.      sucralfate (CARAFATE) 1 gram tablet 1 g.        Facility-Administered Medications Ordered in Other Visits   Medication Dose Route Frequency Provider Last Rate Last Dose    [START ON 5/15/2018] dexamethasone (DECADRON) 12 mg in 0.9% sodium chloride 50 mL IVPB  12 mg IntraVENous ONCE Yuri Cook MD        [START ON 5/15/2018] fosaprepitant (EMEND) 150 mg in 0.9% sodium chloride 150 mL IVPB  150 mg IntraVENous ONCE Yuri Cook MD       [address] [place] [address] ON 5/15/2018] pegfilgrastim (NEULASTA) wearable SQ injector 6 mg  6 mg SubCUTAneous ONCE Yuri Cook MD        [START ON 5/15/2018] trastuzumab (HERCEPTIN) 320 mg in 0.9% sodium chloride 250 mL IVPB  320 mg IntraVENous ONCE Victoriano Serna MD        [START ON 5/15/2018] pertuzumab (PERJETA) 420 mg in 0.9% sodium chloride 250 mL IVPB  420 mg IntraVENous ONCE Victoriano Serna MD        [START ON 5/15/2018] DOCEtaxel (TAXOTERE) 120 mg in 0.9% sodium chloride 250 mL chemo infusion  120 mg IntraVENous ONCE MD Yoel Chau.Hamman Marelyn Chock ON 5/15/2018] CARBOplatin (PARAPLATIN) 540 mg in 0.9% sodium chloride 250 mL chemo infusion  540 mg IntraVENous ONCE Victoriano Serna MD           Review of Systems  Constitutional: The patient has complaints of a mild degree of fatigue and some weakness following chemotherapy. She has a noticeable increase in her food intake due to increasing appetite following chemotherapy. HEENT: The patient denies recent head trauma, eye pain, blurred vision,  hearing deficit, oropharyngeal mucosal pain or lesions, and the patient denies throat pain or discomfort. Lymphatics: The patient denies palpable peripheral lymphadenopathy. Hematologic: The patient denies having bruising, bleeding, or progressive fatigue. Respiratory: Patient denies having shortness of breath, cough, sputum production, fever, or dyspnea on exertion. Cardiovascular: The patient denies having leg pain, leg swelling, heart palpitations, chest permit, chest pain, or lightheadedness. The patient denies having dyspnea on exertion. Gastrointestinal: The patient denies having nausea, emesis, or diarrhea. The patient denies having any hematemesis or blood in the stool. Genitourinary: Patient denies having urinary urgency, frequency, or dysuria. The patient denies having blood in the urine. Psychological: The patient denies having symptoms of nervousness, anxiety, depression, or thoughts of harming self. Skin: Patient denies having skin rashes, skin, ulcerations, or unexplained itching or pruritus. Musculoskeletal: The patient denies having pain in the joints or bones.       Objective:     Visit Vitals    BP 110/66    Pulse 95    Temp 98 °F (36.7 °C)    Wt 50.3 kg (111 lb)    BMI 18.47 kg/m2     ECOG PS=0    Physical Exam:   Gen. Appearance: The patient is in no acute distress. Skin: There is no bruise or rash. HEENT: The exam is unremarkable. Neck: Supple without lymphadenopathy or thyromegaly. Lungs: Clear to auscultation and percussion; there are no wheezes or rhonchi. Heart: Regular rate and rhythm; there are no murmurs, gallops, or rubs. Anterior chest wall and breast: There is no change in the exam.  Abdomen: Bowel sounds are present and normal.  There is no guarding, tenderness, or hepatosplenomegaly. Extremities: There is no clubbing, cyanosis, or edema. Neurologic: There are no focal neurologic deficits. Lymphatics: There is no palpable peripheral lymphadenopathy. Musculoskeletal: The patient has full range of motion at all joints. There is no evidence of joint deformity or effusions. There is no focal joint tenderness. Psychological/psychiatric: There is no clinical evidence of anxiety, depression, or melancholy. Lab data:      Results for orders placed or performed during the hospital encounter of 05/14/18   CBC WITH 3 PART DIFF     Status: Abnormal   Result Value Ref Range Status    WBC 5.9 4.5 - 13.0 K/uL Final    RBC 3.03 (L) 4.10 - 5.10 M/uL Final    HGB 9.6 (L) 12.0 - 16 g/dL Final    HCT 28.4 (L) 36 - 48 % Final    MCV 93.7 78 - 102 FL Final    MCH 31.7 25.0 - 35.0 PG Final    MCHC 33.8 31 - 37 g/dL Final    RDW 16.2 (H) 11.5 - 14.5 % Final    PLATELET 467 405 - 544 K/uL Final    NEUTROPHILS 65 40 - 70 % Final    MIXED CELLS 7 0.1 - 17 % Final    LYMPHOCYTES 28 14 - 44 % Final    ABS. NEUTROPHILS 3.9 1.8 - 9.5 K/UL Final    ABS. MIXED CELLS 0.4 0.0 - 2.3 K/uL Final    ABS. LYMPHOCYTES 1.6 1.1 - 5.9 K/UL Final     Comment: Test performed at [address] Results Reviewed by Medical Director. DF AUTOMATED   Final           Assessment:     1.  Malignant neoplasm of upper-outer quadrant of right breast in female, estrogen receptor negative ([facility] [address]    2. Antineoplastic chemotherapy induced anemia    3. Fatigue due to treatment      Plan:   Invasive ductal adenocarcinoma right upper quadrant of the breast: Cycle 3 of her treatment program with carboplatin based regimen and Herceptin based regimen will be given on 5/15/2018. Chemotherapy-induced anemia: I will check her iron profile and ferritin levels at this time. I have instructed the patient to begin taking Slow Fe 1 tablet daily. She will also be started on Procrit 60,000 unit with her chemotherapy treatment. Her hemoglobin today is 9.6 g/dl and hematocrit is 28.4%. Fatigue due to treatment: pt will continue to stay well-hydrated particularly on the weeks of chemotherapy and get  Lots of rest.     Follow-up in 3 weeks  Orders Placed This Encounter    COMPLETE CBC & AUTO DIFF WBC    InHouse CBC (NETpeas)     Standing Status:   Future     Number of Occurrences:   1     Standing Expiration Date:   5/21/2018    IRON PROFILE     Standing Status:   Future     Standing Expiration Date:   5/15/2019    FERRITIN     Standing Status:   Future     Standing Expiration Date:   8/64/5802    METABOLIC PANEL, COMPREHENSIVE     Standing Status:   Future     Standing Expiration Date:   5/15/2019    CA 27.29     Standing Status:   Future     Standing Expiration Date:   5/15/2019       Lenore Hernandez NP  5/14/2018      I have assessed the patient independently and  agree with the full assessment as outlined. Hyacinth Ortiz MD, FACP      Please note: This document has been produced using voice recognition software. Unrecognized errors in transcription may be present.

## 2018-05-14 NOTE — MR AVS SNAPSHOT
[facility] [place] [address] [address] [facility] 
[phone] Patient: Wandy Velazquez MRN: [id number] IJF:[birth year] Visit Information Date & Time Provider Department Dept. Phone Encounter #  
 5/14/2018 11:15 AM Shiraz Fernández MD [facility] Medical Oncology [phone] [id number] Follow-up Instructions Return in about 4 weeks (around 6/11/2018). Your Appointments 6/11/2018 11:00 AM  
Office Visit with Shiraz Fernández MD  
[facility]  On[facility] [address] [place] [address]) Appt Note: 4 weeks [facility], [place] [address] [facility] [address] [facility], [address] [place] [address] 200 [facility] 7/13/2018  2:00 PM  
Follow Up with [facility] ([address] [facility]) Appt Note: 4 month f/up [address] [facility] [address] [facility] [address] [id number] [address] [facility] Upcoming Health Maintenance Date Due Pneumococcal 19-64 Highest Risk (1 of 3 - PCV13) 9/5/1979 DTaP/Tdap/Td series (1 - Tdap) 9/5/1981 PAP AKA CERVICAL CYTOLOGY 9/5/1981 BREAST CANCER SCRN MAMMOGRAM 9/5/2010 FOBT Q 1 YEAR AGE 50-75 9/5/2010 Influenza Age 5 to Adult 8/1/2018 Allergies as of 5/14/2018  Review Complete On: 5/14/2018 By: Shiraz Fernández MD  
 No Known Allergies Current Immunizations  Reviewed on 4/3/2018 No immunizations on file. Not reviewed this visit You Were Diagnosed With   
  
 Codes Comments Malignant neoplasm of upper-outer quadrant of right breast in female, estrogen receptor negative ([facility] [address]    -  Primary ICD-10-CM: C50.411, Z17.1 ICD-9-CM: 174.4, V86.1 Vitals BP Pulse Temp Weight(growth percentile) BMI OB Status 110/66 95 98 °F (36.7 °C) 111 lb (50.3 kg) 18.47 kg/m2 Hysterectomy Smoking Status Never Smoker BMI and BSA Data Body Mass Index Body Surface Area  
 18.47 kg/m 2 1.52 m 2 Preferred Pharmacy Pharmacy Name Phone [facility], [address] [place] [phone] Your Updated Medication List  
  
   
This list is accurate as of 5/14/18 12:08 PM.  Always use your most recent med list.  
  
  
  
  
 calcium carbonate 500 mg calcium (1,250 mg) chewable tablet Commonly known as:  CALCIUM 500 Take 1 Tab by mouth two (2) times a day. Indications: hypocalcemia  
  
 dexamethasone 4 mg tablet Commonly known as:  DECADRON Take 8 mg once po daily the day before Chemotherapy, the day of chemo and the day after chemo  
  
 docusate sodium 100 mg capsule Commonly known as:  Jayne Skinny Take 1 Cap by mouth two (2) times a day for 90 days. famotidine 40 mg tablet Commonly known as:  PEPCID  
40 mg. HYDROcodone-acetaminophen 5-325 mg per tablet Commonly known as:  Yanique Barr Take 1 Tab by mouth every four (4) hours as needed for Pain. Max Daily Amount: 6 Tabs. KEFLEX 250 mg capsule Generic drug:  cephALEXin Take 250 mg by mouth two (2) times a day. lidocaine-prilocaine topical cream  
Commonly known as:  EMLA Apply a small amount to port area one hour prior to chemo and cover with saran wrap  
  
 ondansetron hcl 8 mg tablet Commonly known as:  Elesa Balm Take one tab every 8 hours for nausea starting the night of chemo and for three days  
  
 potassium chloride SA 10 mEq capsule Commonly known as:  [place] Melvin Take 1 Cap by mouth daily. prochlorperazine 10 mg tablet Commonly known as:  COMPAZINE Take one tab po every 6 hours with benadryl 25 mg starting the night of chemo and for three days then PRN  
  
 sucralfate 1 gram tablet Commonly known as:  CARAFATE  
1 g.  
  
 warfarin 1 mg tablet Commonly known as:  COUMADIN Take one tab po daily at 6 pm or after We Performed the Following COMPLETE CBC & AUTO DIFF WBC [29157 CPT(R)] Follow-up Instructions Return in about 4 weeks (around 6/11/2018). To-Do List   
 05/14/2018 Lab:  CBC WITH 3 PART DIFF   
  
 05/15/2018  8:00 AM  
  Appointment with [facility]URSE [facility] at [facility] ([phone])  
  
 06/05/2018 8:00 AM  
  Appointment with [facility]URSE [facility] at [facility] ([phone]) Patient Instructions Learning About Breast Cancer Treatments Your Care Instructions Breast cancer means abnormal cells grow out of control in one or both breasts. These cancer cells can spread from the breast to nearby lymph nodes and other tissues. They can also spread to other parts of the body. The type and stage of cancer you have is based on: · Where in the breast the cancer started. · The genetics of those cancer cells. · How far cancer has spread within the breast, to nearby tissues, or to other organs. · What the cancer cells look like under a microscope. · Whether there is cancer in the nearby lymph nodes. Tests that help find the stage of your cancer can help choose the right treatment for you. These tests can include a breast biopsy, lymph node biopsy, blood tests, and X-rays. You may need other tests as well, such as a bone, CT, or MRI scan. Whether you have more tests depends on your symptoms and the stage of the cancer. When you find out that you have cancer, you may feel many emotions and may need some help coping. Seek out family, friends, and counselors for support. You also can do things at home to make yourself feel better while you go through treatment. Call the [employer] ([phone]) or visit its website at [address] [place] [address] for more information. How is breast cancer treated? Your doctor may combine treatments. This is a common way to treat breast cancer. Treatment depends on what type and stage of cancer you have. You may have: · Surgery to remove the cancer. · Radiation. This uses high-dose X-rays to kill cancer cells and shrink tumors. · Chemotherapy. This uses medicine to kill cancer cells. · Hormone therapy. This uses medicines such as tamoxifen. It limits the effect of the hormone estrogen. This hormone can help some types of breast cancer cells to grow. · Biological therapy. This uses medicines such as trastuzumab (Herceptin) to help your immune system fight the cancer. What surgeries are done for breast cancer? Surgery is a key part of treatment for breast cancer. The main types of surgeries are: · Breast-conserving surgery. This is surgery that does not remove the whole breast. This includes: 
¨ Lumpectomy. This surgery removes the cancer in the breast and some of the normal tissue around it. ¨ Partial mastectomy. This surgery removes part of the breast. The lining of the chest muscles below the cancer and some of the lymph nodes may also be removed. · Surgery to remove the breast. This includes: ¨ Total mastectomy. This removes the whole breast. 
¨ Nipple-sparing mastectomy. This removes the whole breast but leaves the nipple. ¨ Modified radical mastectomy. This removes the whole breast and the lymph nodes under the arm (axillary lymph nodes). ¨ Radical mastectomy. This removes the whole breast, the chest muscles, and all the lymph nodes under the arm. If lymph nodes under the arm are removed, they are looked at under the microscope to check for cancer. There are two types of lymph node removal: · Register lymph node biopsy removes the lymph node that is the first to receive lymph fluid from the tumor. If cancer has spread from the breast to the lymph nodes, cancer cells most often show up in the sentinel node first. 
· Axillary lymph node dissection removes most of the lymph nodes in the armpit.  
The type of surgery you have depends on the size, location, and type of the cancer. It also depends on your overall health and personal preferences. Even if your doctor removes all the cancer that can be seen at the time of your surgery, you may still need more treatment. You may get radiation, chemotherapy, or hormone therapy after surgery to try to kill any cancer cells that may be left. No matter what kind of surgery you have, you will get information about why you are having it, what its risks are, how to prepare, and what to do after surgery. Follow-up care is a key part of your treatment and safety. Be sure to make and go to all appointments, and call your doctor if you are having problems. It's also a good idea to know your test results and keep a list of the medicines you take. Where can you learn more? Go to http://catalino-tiesha.info/. Enter X810 in the search box to learn more about \"Learning About Breast Cancer Treatments. \" Current as of: May 12, 2017 Content Version: 11.4 © 3582-9340 [employer]. Care instructions adapted under license by [employer] (which disclaims liability or warranty for this information). If you have questions about a medical condition or this instruction, always ask your healthcare professional. [address] any warranty or liability for your use of this information. Complete Blood Count (CBC): About This Test 
What is it? A complete blood count (CBC) is a blood test that gives important information about your blood cells, especially red blood cells, white blood cells, and platelets. Why is this test done? A CBC may be done as part of a regular physical exam. There are many other reasons that a doctor may want this blood test, including to: · Find the cause of symptoms such as fatigue, weakness, fever, bruising, or weight loss. · Find anemia or an infection. · See how much blood has been lost if there is bleeding. · Diagnose diseases of the blood, such as leukemia or polycythemia. How can you prepare for the test? 
You do not need to do anything before having this test. 
What happens during the test? 
The health professional taking a sample of your blood will: · Wrap an elastic band around your upper arm. This makes the veins below the band larger so it is easier to put a needle into the vein. · Clean the needle site with alcohol. · Put the needle into the vein. · Attach a tube to the needle to fill it with blood. · Remove the band from your arm when enough blood is collected. · Put a gauze pad or cotton ball over the needle site as the needle is removed. · Put pressure on the site and then put on a bandage. If this blood test is done on a baby, a heel stick may be done instead of a blood draw from a vein. What happens after the test? 
· You will probably be able to go home right away. · You can go back to your usual activities right away. Follow-up care is a key part of your treatment and safety. Be sure to make and go to all appointments, and call your doctor if you are having problems. It's also a good idea to keep a list of the medicines you take. Ask your doctor when you can expect to have your test results. Where can you learn more? Go to http://catalino-tiesha.info/. Enter V264 in the search box to learn more about \"Complete Blood Count (CBC): About This Test.\" Current as of: October 14, 2016 Content Version: 11.4 © 3772-3415 [employer]. Care instructions adapted under license by [employer] (which disclaims liability or warranty for this information). If you have questions about a medical condition or this instruction, always ask your healthcare professional. [place] [address] any warranty or liability for your use of this information. Introducing [facility]!    
 [facility] introduces Perpetuall patient portal. Now you can access parts of your medical record, email your doctor's office, and request medication refills online. 1. In your internet browser, go to [email or web address] 2. Click on the First Time User? Click Here link in the Sign In box. You will see the New Member Sign Up page. 3. Enter your Site9 Access Code exactly as it appears below. You will not need to use this code after youve completed the sign-up process. If you do not sign up before the expiration date, you must request a new code. · Site9 Access Code: [email or web address] Expires: 8/12/2018 12:07 PM 
 
4. Enter the last four digits of your Social Security Number (xxxx) and Date of Birth (mm/dd/yyyy) as indicated and click Submit. You will be taken to the next sign-up page. 5. Create a Site9 ID. This will be your Site9 login ID and cannot be changed, so think of one that is secure and easy to remember. 6. Create a Site9 password. You can change your password at any time. 7. Enter your Password Reset Question and Answer. This can be used at a later time if you forget your password. 8. Enter your e-mail address. You will receive e-mail notification when new information is available in [address] 9. Click Sign Up. You can now view and download portions of your medical record. 10. Click the Download Summary menu link to download a portable copy of your medical information. If you have questions, please visit the Frequently Asked Questions section of the Site9 website. Remember, Site9 is NOT to be used for urgent needs. For medical emergencies, dial 911. Now available from your iPhone and Android! Please provide this summary of care documentation to your next provider. Your primary care clinician is listed as Julio Cesar Clarke. If you have any questions after today's visit, please call [phone].

## 2018-05-14 NOTE — PATIENT INSTRUCTIONS
Learning About Breast Cancer Treatments  Your Care Instructions    Breast cancer means abnormal cells grow out of control in one or both breasts. These cancer cells can spread from the breast to nearby lymph nodes and other tissues. They can also spread to other parts of the body. The type and stage of cancer you have is based on:  · Where in the breast the cancer started. · The genetics of those cancer cells. · How far cancer has spread within the breast, to nearby tissues, or to other organs. · What the cancer cells look like under a microscope. · Whether there is cancer in the nearby lymph nodes. Tests that help find the stage of your cancer can help choose the right treatment for you. These tests can include a breast biopsy, lymph node biopsy, blood tests, and X-rays. You may need other tests as well, such as a bone, CT, or MRI scan. Whether you have more tests depends on your symptoms and the stage of the cancer. When you find out that you have cancer, you may feel many emotions and may need some help coping. Seek out family, friends, and counselors for support. You also can do things at home to make yourself feel better while you go through treatment. Call the [employer] (2-727.328.3417) or visit its website at [employer] [place] for more information. How is breast cancer treated? Your doctor may combine treatments. This is a common way to treat breast cancer. Treatment depends on what type and stage of cancer you have. You may have:  · Surgery to remove the cancer. · Radiation. This uses high-dose X-rays to kill cancer cells and shrink tumors. · Chemotherapy. This uses medicine to kill cancer cells. · Hormone therapy. This uses medicines such as tamoxifen. It limits the effect of the hormone estrogen. This hormone can help some types of breast cancer cells to grow. · Biological therapy.  This uses medicines such as trastuzumab (Herceptin) to help your immune system fight the cancer. What surgeries are done for breast cancer? Surgery is a key part of treatment for breast cancer. The main types of surgeries are:  · Breast-conserving surgery. This is surgery that does not remove the whole breast. This includes:  ¨ Lumpectomy. This surgery removes the cancer in the breast and some of the normal tissue around it. ¨ Partial mastectomy. This surgery removes part of the breast. The lining of the chest muscles below the cancer and some of the lymph nodes may also be removed. · Surgery to remove the breast. This includes:  ¨ Total mastectomy. This removes the whole breast.  ¨ Nipple-sparing mastectomy. This removes the whole breast but leaves the nipple. ¨ Modified radical mastectomy. This removes the whole breast and the lymph nodes under the arm (axillary lymph nodes). ¨ Radical mastectomy. This removes the whole breast, the chest muscles, and all the lymph nodes under the arm. If lymph nodes under the arm are removed, they are looked at under the microscope to check for cancer. There are two types of lymph node removal:  · Colon lymph node biopsy removes the lymph node that is the first to receive lymph fluid from the tumor. If cancer has spread from the breast to the lymph nodes, cancer cells most often show up in the sentinel node first.  · Axillary lymph node dissection removes most of the lymph nodes in the armpit. The type of surgery you have depends on the size, location, and type of the cancer. It also depends on your overall health and personal preferences. Even if your doctor removes all the cancer that can be seen at the time of your surgery, you may still need more treatment. You may get radiation, chemotherapy, or hormone therapy after surgery to try to kill any cancer cells that may be left. No matter what kind of surgery you have, you will get information about why you are having it, what its risks are, how to prepare, and what to do after surgery.   Follow-up care is a key part of your treatment and safety. Be sure to make and go to all appointments, and call your doctor if you are having problems. It's also a good idea to know your test results and keep a list of the medicines you take. Where can you learn more? Go to http://catalino-tiesha.info/. Enter X810 in the search box to learn more about \"Learning About Breast Cancer Treatments. \"  Current as of: May 12, 2017  Content Version: 11.4  © 9222-1534 [employer]. Care instructions adapted under license by [employer] (which disclaims liability or warranty for this information). If you have questions about a medical condition or this instruction, always ask your healthcare professional. [address] any warranty or liability for your use of this information. Complete Blood Count (CBC): About This Test  What is it? A complete blood count (CBC) is a blood test that gives important information about your blood cells, especially red blood cells, white blood cells, and platelets. Why is this test done? A CBC may be done as part of a regular physical exam. There are many other reasons that a doctor may want this blood test, including to:  · Find the cause of symptoms such as fatigue, weakness, fever, bruising, or weight loss. · Find anemia or an infection. · See how much blood has been lost if there is bleeding. · Diagnose diseases of the blood, such as leukemia or polycythemia. How can you prepare for the test?  You do not need to do anything before having this test.  What happens during the test?  The health professional taking a sample of your blood will:  · Wrap an elastic band around your upper arm. This makes the veins below the band larger so it is easier to put a needle into the vein. · Clean the needle site with alcohol. · Put the needle into the vein. · Attach a tube to the needle to fill it with blood.   · Remove the band from your arm when enough blood is collected. · Put a gauze pad or cotton ball over the needle site as the needle is removed. · Put pressure on the site and then put on a bandage. If this blood test is done on a baby, a heel stick may be done instead of a blood draw from a vein. What happens after the test?  · You will probably be able to go home right away. · You can go back to your usual activities right away. Follow-up care is a key part of your treatment and safety. Be sure to make and go to all appointments, and call your doctor if you are having problems. It's also a good idea to keep a list of the medicines you take. Ask your doctor when you can expect to have your test results. Where can you learn more? Go to http://catalino-tiesha.info/. Enter H065 in the search box to learn more about \"Complete Blood Count (CBC): About This Test.\"  Current as of: October 14, 2016  Content Version: 11.4  © 0240-2398 [employer]. Care instructions adapted under license by [employer] (which disclaims liability or warranty for this information). If you have questions about a medical condition or this instruction, always ask your healthcare professional. [address] any warranty or liability for your use of this information.

## 2018-05-15 ENCOUNTER — HOSPITAL ENCOUNTER (OUTPATIENT)
Dept: INFUSION THERAPY | Age: 58
Discharge: HOME | End: 2018-05-15
Payer: MEDICAID

## 2018-05-15 VITALS
HEIGHT: 65 IN | BODY MASS INDEX: 18.31 KG/M2 | WEIGHT: 109.9 LBS | DIASTOLIC BLOOD PRESSURE: 79 MMHG | TEMPERATURE: 98.6 F | SYSTOLIC BLOOD PRESSURE: 124 MMHG | HEART RATE: 88 BPM | OXYGEN SATURATION: 100 % | RESPIRATION RATE: 18 BRPM

## 2018-05-15 LAB — CANCER AG27-29 SERPL-ACNC: 27.1 U/ML (ref 0–38.6)

## 2018-05-15 PROCEDURE — 74011250636 HC RX REV CODE- 250/636

## 2018-05-15 PROCEDURE — 96417 CHEMO IV INFUS EACH ADDL SEQ: CPT

## 2018-05-15 PROCEDURE — 96377 APPLICATON ON-BODY INJECTOR: CPT

## 2018-05-15 PROCEDURE — 74011000258 HC RX REV CODE- 258: Performed by: INTERNAL MEDICINE

## 2018-05-15 PROCEDURE — 77030012965 HC NDL HUBR BBMI -A

## 2018-05-15 PROCEDURE — 96413 CHEMO IV INFUSION 1 HR: CPT

## 2018-05-15 PROCEDURE — 96375 TX/PRO/DX INJ NEW DRUG ADDON: CPT

## 2018-05-15 PROCEDURE — 96367 TX/PROPH/DG ADDL SEQ IV INF: CPT

## 2018-05-15 PROCEDURE — 74011250636 HC RX REV CODE- 250/636: Performed by: INTERNAL MEDICINE

## 2018-05-15 RX ORDER — HEPARIN 100 UNIT/ML
500 SYRINGE INTRAVENOUS AS NEEDED
Status: DISCONTINUED | OUTPATIENT
Start: 2018-05-15 | End: 2018-05-19 | Stop reason: HOSPADM

## 2018-05-15 RX ORDER — SODIUM CHLORIDE 0.9 % (FLUSH) 0.9 %
10-40 SYRINGE (ML) INJECTION AS NEEDED
Status: DISCONTINUED | OUTPATIENT
Start: 2018-05-15 | End: 2018-05-19 | Stop reason: HOSPADM

## 2018-05-15 RX ORDER — PALONOSETRON 0.05 MG/ML
0.25 INJECTION, SOLUTION INTRAVENOUS ONCE
Status: COMPLETED | OUTPATIENT
Start: 2018-05-15 | End: 2018-05-15

## 2018-05-15 RX ORDER — SODIUM CHLORIDE 9 MG/ML
500 INJECTION, SOLUTION INTRAVENOUS ONCE
Status: COMPLETED | OUTPATIENT
Start: 2018-05-15 | End: 2018-05-15

## 2018-05-15 RX ADMIN — TRASTUZUMAB 320 MG: 150 INJECTION, POWDER, LYOPHILIZED, FOR SOLUTION INTRAVENOUS at 10:09

## 2018-05-15 RX ADMIN — PEGFILGRASTIM 6 MG: KIT SUBCUTANEOUS at 14:48

## 2018-05-15 RX ADMIN — SODIUM CHLORIDE 120 MG: 900 INJECTION, SOLUTION INTRAVENOUS at 12:00

## 2018-05-15 RX ADMIN — Medication 10 ML: at 08:39

## 2018-05-15 RX ADMIN — CARBOPLATIN 540 MG: 10 INJECTION, SOLUTION INTRAVENOUS at 13:25

## 2018-05-15 RX ADMIN — Medication 20 ML: at 14:44

## 2018-05-15 RX ADMIN — PERTUZUMAB 420 MG: 30 INJECTION, SOLUTION, CONCENTRATE INTRAVENOUS at 10:51

## 2018-05-15 RX ADMIN — SODIUM CHLORIDE 150 MG: 900 INJECTION, SOLUTION INTRAVENOUS at 08:48

## 2018-05-15 RX ADMIN — SODIUM CHLORIDE, PRESERVATIVE FREE 500 UNITS: 5 INJECTION INTRAVENOUS at 14:44

## 2018-05-15 RX ADMIN — PALONOSETRON HYDROCHLORIDE 0.25 MG: 0.25 INJECTION INTRAVENOUS at 08:42

## 2018-05-15 RX ADMIN — SODIUM CHLORIDE 500 ML: 9 INJECTION, SOLUTION INTRAVENOUS at 08:40

## 2018-05-15 NOTE — PROGRESS NOTES
[facility] Progress Note    Date: May 15, 2018    Name: Robert Palacio              MRN: [id number]              : 1960    Chemotherapy Cycle: Perjeta/Herceptin/Taxol/Carbo C3D4       Pt to [facility], ambulatory, at [address] [facility]. Ms. Malissa Mast was assessed and education was provided. Ms. Ventura's vitals were reviewed. Visit Vitals    /79 (BP 1 Location: Right arm, BP Patient Position: Sitting)    Pulse 88    Temp 98.6 °F (37 °C)    Resp 18    Ht 5' 4.96\" (1.65 m)    Wt 49.9 kg (109 lb 14.4 oz)    SpO2 100%    Breastfeeding No    BMI 18.31 kg/m2       Left chest double lumen mediport accessed with 20 g 1 inch johnson needle. Port flushed easily and had brisk blood return. NS initiated @ [facility]. Patient states that she is not pregnant and refused a pregnancy test today. Lab results were obtained and reviewed from 2018. No results found for this or any previous visit (from the past 12 hour(s)). Patient's calcium=7.6. Valdez Dailey NP notified. Calcium prescription was e-sent to her pharmacy by the NP. Patient given instruction to take the medication as ordered. Patient stated understanding. Patient's phone number on file was reviewed to ensure that it was the correct number. Patient verified the number is correct. Lab results within ordered parameters to give chemo today. PLT = 216, ANC = 3.9. Chemo dosages verified with today's BSA and creatinine and found to be within 10% of ordered dosages. Patient states that she took her Decadron BID as ordered yesterday. She was also given a reminder to take her Decadron BID tomorrow as well. Patient stated understanding. Pre-medications (Emend 150 mg and Aloxi 0.25 mg) were administered as ordered and chemotherapy was initiated after blood return from port re-verified. Reviewed expected side effects of premeds with patient.  Decadron 12 mg was held per written order since the patient took the PO Decadron prior to coming into the [facility] today.     Trastuzumab 320 mg was initiated to run over 30 minuets per written order. 15 minutes into infusion, VS stable and pt denied itching/hives, lip/tongue/facial swelling, SOB, back pain, or other complaints. Pt tolerated entire infusion well and VS remained stable. Line flushed with NS and blood return from port re-verified. Pertuzumab 420 mg was initiated to run over 30 minuets per written order. 15 minutes into infusion, VS stable and pt denied itching/hives, lip/tongue/facial swelling, SOB, back pain, or other complaints. Pt tolerated entire infusion well and VS remained stable. Line flushed with NS and blood return from port re-verified. Patient was observed for 30 minuets post infusion per written order. No reactions or distress noted. Docetaxel 120 mg was initiated to run over 60 minuets per written order. 15 minutes into infusion, VS stable and pt denied itching/hives, lip/tongue/facial swelling, SOB, back pain, or other complaints. Pt tolerated entire infusion well and VS remained stable. Line flushed with NS and blood return from port re-verified. Carboplatin 540 mg was initiated to run over 60 minuets per written order. VS stable at end of infusion and pt denied complaints. Line flushed with NS and blood return from port re-verified. Ms. Dagmar Ugalde tolerated infusion, and had no complaints at this time. Patient politely refused her post infusion observation period at this time. Patient Vitals for the past 12 hrs:   Temp Pulse Resp BP SpO2   05/15/18 1443 98.6 °F (37 °C) 88 18 124/79 100 %   05/15/18 1156 97.8 °F (36.6 °C) 82 18 98/62 -   05/15/18 0809 98.5 °F (36.9 °C) 95 18 127/76 100 %       Mediport flushed with NS 20 ml and Heparin 500 units then de-accessed. No irritation or bleeding noted. Bandaid applied. Neulasta 6 mg OBI was placed on her right arm. The green light was noted to be on and blinking.  Patient was told that she could remove the OBI tomorrow, 05/16/2018 at approximately 7:00 pm. Patient was given instructions on the medication and she stated understanding. Patient armband removed and shredded. Ms. Marin Bolden was discharged from [place] [address] in stable condition at [phone]. She is to return on 06/05/2018 at 0800 for her next chemo appointment.     Phil Daniel RN  May 15, 2018

## 2018-05-16 DIAGNOSIS — E87.6 HYPOKALEMIA: ICD-10-CM

## 2018-05-16 RX ORDER — POTASSIUM CHLORIDE 750 MG/1
10 CAPSULE, EXTENDED RELEASE ORAL DAILY
Qty: 30 CAP | Refills: 0 | Status: SHIPPED | OUTPATIENT
Start: 2018-05-16 | End: 2018-06-20 | Stop reason: SDUPTHER

## 2018-05-21 ENCOUNTER — HOSPITAL ENCOUNTER (OUTPATIENT)
Dept: INFUSION THERAPY | Age: 58
Discharge: HOME | End: 2018-05-21
Payer: MEDICAID

## 2018-05-21 VITALS
SYSTOLIC BLOOD PRESSURE: 104 MMHG | TEMPERATURE: 98.9 F | RESPIRATION RATE: 18 BRPM | HEART RATE: 110 BPM | OXYGEN SATURATION: 100 % | DIASTOLIC BLOOD PRESSURE: 68 MMHG

## 2018-05-21 LAB
BASO+EOS+MONOS # BLD AUTO: 1.3 K/UL (ref 0–2.3)
BASO+EOS+MONOS # BLD AUTO: 25 % (ref 0.1–17)
DIFFERENTIAL METHOD BLD: ABNORMAL
ERYTHROCYTE [DISTWIDTH] IN BLOOD BY AUTOMATED COUNT: 15.9 % (ref 11.5–14.5)
HCT VFR BLD AUTO: 29.9 % (ref 36–48)
HGB BLD-MCNC: 9.9 G/DL (ref 12–16)
LYMPHOCYTES # BLD: 1.7 K/UL (ref 1.1–5.9)
LYMPHOCYTES NFR BLD: 32 % (ref 14–44)
MCH RBC QN AUTO: 30.7 PG (ref 25–35)
MCHC RBC AUTO-ENTMCNC: 33.1 G/DL (ref 31–37)
MCV RBC AUTO: 92.9 FL (ref 78–102)
NEUTS SEG # BLD: 2.4 K/UL (ref 1.8–9.5)
NEUTS SEG NFR BLD: 43 % (ref 40–70)
PLATELET # BLD AUTO: 185 K/UL (ref 140–440)
RBC # BLD AUTO: 3.22 M/UL (ref 4.1–5.1)
WBC # BLD AUTO: 5.4 K/UL (ref 4.5–13)

## 2018-05-21 PROCEDURE — 36415 COLL VENOUS BLD VENIPUNCTURE: CPT

## 2018-05-21 PROCEDURE — 85025 COMPLETE CBC W/AUTO DIFF WBC: CPT | Performed by: INTERNAL MEDICINE

## 2018-05-21 PROCEDURE — 74011250636 HC RX REV CODE- 250/636: Performed by: NURSE PRACTITIONER

## 2018-05-21 PROCEDURE — 96372 THER/PROPH/DIAG INJ SC/IM: CPT

## 2018-05-21 RX ADMIN — ERYTHROPOIETIN 20000 UNITS: 20000 INJECTION, SOLUTION INTRAVENOUS; SUBCUTANEOUS at 13:58

## 2018-05-21 RX ADMIN — ERYTHROPOIETIN 40000 UNITS: 40000 INJECTION, SOLUTION INTRAVENOUS; SUBCUTANEOUS at 13:58

## 2018-05-21 NOTE — PROGRESS NOTES
[facility] Progress Note    Date: May 21, 2018    Name: Claudia Campos    MRN: [id number]         : 1960     cbc/procrit Q 4 weeks      Ms. Glenis Fairchild arrived to [address] [place] [address] at [phone] ambulatory. Denies pain, but states she is tired and fatigued. States she get small amounts nose bleed every morning on blowing her nostrils. Cautioned not to blow her nostrils unless she has sinus drainage. Also instructed to monitor same, and if it worsens, to call MD or go to ER. Patient verbalized understanding     Ms. Glenis Fairchild was assessed and education was provided. Care notes given for procrit. Patient verbalized understanding of possible reaction, side effects and management    Ms. Ventura's vitals were reviewed. Visit Vitals    /74 (BP 1 Location: Left arm, BP Patient Position: Sitting)    Pulse (!) 103    Temp 98.5 °F (36.9 °C)    Resp 18    SpO2 100%       Blood drawn for CBC via RIGHT AC venipuncture [id number]. Lab results were obtained and reviewed. Recent Results (from the past 12 hour(s))   CBC WITH 3 PART DIFF    Collection Time: 18  1:45 PM   Result Value Ref Range    WBC 5.4 4.5 - 13.0 K/uL    RBC 3.22 (L) 4.10 - 5.10 M/uL    HGB 9.9 (L) 12.0 - 16 g/dL    HCT 29.9 (L) 36 - 48 %    MCV 92.9 78 - 102 FL    MCH 30.7 25.0 - 35.0 PG    MCHC 33.1 31 - 37 g/dL    RDW 15.9 (H) 11.5 - 14.5 %    PLATELET 680 748 - 772 K/uL    NEUTROPHILS 43 40 - 70 %    MIXED CELLS 25 (H) 0.1 - 17 %    LYMPHOCYTES 32 14 - 44 %    ABS. NEUTROPHILS 2.4 1.8 - 9.5 K/UL    ABS. MIXED CELLS 1.3 0.0 - 2.3 K/uL    ABS. LYMPHOCYTES 1.7 1.1 - 5.9 K/UL    DF AUTOMATED         H/H AS NOTED ABOVE    Procrit injection 60,000 units given SQ to upper back of left arm. Tolerated well. Will monitor patient for 30 minutes    Patient s complaints, reaction or distress 30 minutes post injection.  No irritation around injection site, and bandaid d/i    Patient Vitals for the past 4 hrs:   Temp Pulse Resp BP SpO2   18 1428 98.9 °F (37.2 °C) (!) 110 18 104/68 100 %   05/21/18 1340 98.5 °F (36.9 °C) (!) 103 18 123/74 100 %             Ms. Peg Sousa was discharged from [place] [address] in stable condition at 1430. She is to return on 6/5/18 at 0800 for her next chemo appointment.     Matt Reyes, RN  May 21, 2018

## 2018-06-06 DIAGNOSIS — G89.18 POSTOPERATIVE PAIN: ICD-10-CM

## 2018-06-06 DIAGNOSIS — E87.6 HYPOKALEMIA: ICD-10-CM

## 2018-06-06 RX ORDER — DOCUSATE SODIUM 100 MG/1
100 CAPSULE, LIQUID FILLED ORAL 2 TIMES DAILY
Qty: 60 CAP | Refills: 2 | Status: CANCELLED | OUTPATIENT
Start: 2018-06-06 | End: 2018-09-04

## 2018-06-06 RX ORDER — CEPHALEXIN 250 MG/1
250 CAPSULE ORAL 2 TIMES DAILY
OUTPATIENT
Start: 2018-06-06

## 2018-06-06 RX ORDER — POTASSIUM CHLORIDE 750 MG/1
10 CAPSULE, EXTENDED RELEASE ORAL DAILY
Qty: 30 CAP | Refills: 0 | OUTPATIENT
Start: 2018-06-06

## 2018-06-06 RX ORDER — FAMOTIDINE 40 MG/1
40 TABLET, FILM COATED ORAL
Status: CANCELLED | OUTPATIENT
Start: 2018-06-06

## 2018-06-06 RX ORDER — FAMOTIDINE 40 MG/1
40 TABLET, FILM COATED ORAL DAILY
Qty: 30 TAB | Refills: 1 | Status: SHIPPED | OUTPATIENT
Start: 2018-06-06 | End: 2018-06-20 | Stop reason: SDUPTHER

## 2018-06-06 RX ORDER — DEXAMETHASONE 4 MG/1
TABLET ORAL
Qty: 24 TAB | Refills: 3 | Status: CANCELLED | OUTPATIENT
Start: 2018-06-06

## 2018-06-06 RX ORDER — DEXAMETHASONE 4 MG/1
TABLET ORAL
Qty: 24 TAB | Refills: 3 | Status: SHIPPED | OUTPATIENT
Start: 2018-06-06 | End: 2018-06-20 | Stop reason: SDUPTHER

## 2018-06-06 RX ORDER — HYDROCODONE BITARTRATE AND ACETAMINOPHEN 5; 325 MG/1; MG/1
1 TABLET ORAL
Qty: 60 TAB | Refills: 0 | Status: SHIPPED | OUTPATIENT
Start: 2018-06-06 | End: 2018-06-20 | Stop reason: SDUPTHER

## 2018-06-06 RX ORDER — HYDROCODONE BITARTRATE AND ACETAMINOPHEN 5; 325 MG/1; MG/1
1 TABLET ORAL
Qty: 30 TAB | Refills: 0 | Status: CANCELLED | OUTPATIENT
Start: 2018-06-06

## 2018-06-06 RX ORDER — DOCUSATE SODIUM 100 MG/1
100 CAPSULE, LIQUID FILLED ORAL 2 TIMES DAILY
Qty: 60 CAP | Refills: 2 | Status: SHIPPED | OUTPATIENT
Start: 2018-06-06 | End: 2018-06-20 | Stop reason: SDUPTHER

## 2018-06-11 ENCOUNTER — HOSPITAL ENCOUNTER (OUTPATIENT)
Dept: LAB | Age: 58
Discharge: HOME | End: 2018-06-11
Payer: MEDICAID

## 2018-06-11 ENCOUNTER — HOSPITAL ENCOUNTER (OUTPATIENT)
Dept: ONCOLOGY | Age: 58
Discharge: HOME | End: 2018-06-11

## 2018-06-11 ENCOUNTER — OFFICE VISIT (OUTPATIENT)
Dept: ONCOLOGY | Age: 58
End: 2018-06-11

## 2018-06-11 VITALS
BODY MASS INDEX: 18.83 KG/M2 | SYSTOLIC BLOOD PRESSURE: 149 MMHG | DIASTOLIC BLOOD PRESSURE: 87 MMHG | WEIGHT: 113 LBS | TEMPERATURE: 98.1 F | HEART RATE: 94 BPM

## 2018-06-11 DIAGNOSIS — C50.411 MALIGNANT NEOPLASM OF UPPER-OUTER QUADRANT OF RIGHT BREAST IN FEMALE, ESTROGEN RECEPTOR NEGATIVE (HCC): Primary | ICD-10-CM

## 2018-06-11 DIAGNOSIS — T45.1X5A ANTINEOPLASTIC CHEMOTHERAPY INDUCED ANEMIA: ICD-10-CM

## 2018-06-11 DIAGNOSIS — Z17.1 MALIGNANT NEOPLASM OF UPPER-OUTER QUADRANT OF RIGHT BREAST IN FEMALE, ESTROGEN RECEPTOR NEGATIVE (HCC): ICD-10-CM

## 2018-06-11 DIAGNOSIS — K21.9 GASTROESOPHAGEAL REFLUX DISEASE WITHOUT ESOPHAGITIS: ICD-10-CM

## 2018-06-11 DIAGNOSIS — D64.81 ANTINEOPLASTIC CHEMOTHERAPY INDUCED ANEMIA: ICD-10-CM

## 2018-06-11 DIAGNOSIS — C50.411 MALIGNANT NEOPLASM OF UPPER-OUTER QUADRANT OF RIGHT BREAST IN FEMALE, ESTROGEN RECEPTOR NEGATIVE (HCC): ICD-10-CM

## 2018-06-11 DIAGNOSIS — Z17.1 MALIGNANT NEOPLASM OF UPPER-OUTER QUADRANT OF RIGHT BREAST IN FEMALE, ESTROGEN RECEPTOR NEGATIVE (HCC): Primary | ICD-10-CM

## 2018-06-11 DIAGNOSIS — R53.83 FATIGUE DUE TO TREATMENT: ICD-10-CM

## 2018-06-11 LAB
ALBUMIN SERPL-MCNC: 3.2 G/DL (ref 3.4–5)
ALBUMIN/GLOB SERPL: 0.9 {RATIO} (ref 0.8–1.7)
ALP SERPL-CCNC: 83 U/L (ref 45–117)
ALT SERPL-CCNC: 20 U/L (ref 13–56)
ANION GAP SERPL CALC-SCNC: 9 MMOL/L (ref 3–18)
AST SERPL-CCNC: 17 U/L (ref 15–37)
BASO+EOS+MONOS # BLD AUTO: 0.1 K/UL (ref 0–2.3)
BASO+EOS+MONOS # BLD AUTO: 1 % (ref 0.1–17)
BILIRUB SERPL-MCNC: 0.2 MG/DL (ref 0.2–1)
BUN SERPL-MCNC: 12 MG/DL (ref 7–18)
BUN/CREAT SERPL: 17 (ref 12–20)
CALCIUM SERPL-MCNC: 8.3 MG/DL (ref 8.5–10.1)
CHLORIDE SERPL-SCNC: 108 MMOL/L (ref 100–108)
CO2 SERPL-SCNC: 24 MMOL/L (ref 21–32)
CREAT SERPL-MCNC: 0.69 MG/DL (ref 0.6–1.3)
DIFFERENTIAL METHOD BLD: ABNORMAL
ERYTHROCYTE [DISTWIDTH] IN BLOOD BY AUTOMATED COUNT: 19.2 % (ref 11.5–14.5)
FERRITIN SERPL-MCNC: 141 NG/ML (ref 8–388)
GLOBULIN SER CALC-MCNC: 3.5 G/DL (ref 2–4)
GLUCOSE SERPL-MCNC: 69 MG/DL (ref 74–99)
HCT VFR BLD AUTO: 32 % (ref 36–48)
HGB BLD-MCNC: 10.3 G/DL (ref 12–16)
IRON SATN MFR SERPL: 29 %
IRON SERPL-MCNC: 84 UG/DL (ref 50–175)
LYMPHOCYTES # BLD: 0.5 K/UL (ref 1.1–5.9)
LYMPHOCYTES NFR BLD: 7 % (ref 14–44)
MCH RBC QN AUTO: 31.3 PG (ref 25–35)
MCHC RBC AUTO-ENTMCNC: 32.2 G/DL (ref 31–37)
MCV RBC AUTO: 97.3 FL (ref 78–102)
NEUTS SEG # BLD: 6.3 K/UL (ref 1.8–9.5)
NEUTS SEG NFR BLD: 92 % (ref 40–70)
PLATELET # BLD AUTO: 68 K/UL (ref 140–440)
POTASSIUM SERPL-SCNC: 3.6 MMOL/L (ref 3.5–5.5)
PROT SERPL-MCNC: 6.7 G/DL (ref 6.4–8.2)
RBC # BLD AUTO: 3.29 M/UL (ref 4.1–5.1)
SODIUM SERPL-SCNC: 141 MMOL/L (ref 136–145)
TIBC SERPL-MCNC: 286 UG/DL (ref 250–450)
WBC # BLD AUTO: 6.9 K/UL (ref 4.5–13)

## 2018-06-11 PROCEDURE — 80053 COMPREHEN METABOLIC PANEL: CPT | Performed by: INTERNAL MEDICINE

## 2018-06-11 PROCEDURE — 82728 ASSAY OF FERRITIN: CPT | Performed by: INTERNAL MEDICINE

## 2018-06-11 PROCEDURE — 36415 COLL VENOUS BLD VENIPUNCTURE: CPT | Performed by: INTERNAL MEDICINE

## 2018-06-11 PROCEDURE — 83540 ASSAY OF IRON: CPT | Performed by: INTERNAL MEDICINE

## 2018-06-11 NOTE — PATIENT INSTRUCTIONS
Breast Cancer: Care Instructions  Your Care Instructions    Breast cancer occurs when abnormal cells grow out of control in the breast. These cancer cells can spread within the breast, to nearby lymph nodes and other tissues, and to other parts of the body. Being treated for cancer can weaken your body, and you may feel very tired. Get the rest your body needs so you can feel better. Finding out that you have cancer is scary. You may feel many emotions and may need some help coping. Seek out family, friends, and counselors for support. You also can do things at home to make yourself feel better while you go through treatment. Call the [employer] (8-658.221.1325) or visit its website at [employer] for more information. Follow-up care is a key part of your treatment and safety. Be sure to make and go to all appointments, and call your doctor if you are having problems. It's also a good idea to know your test results and keep a list of the medicines you take. How can you care for yourself at home? · Take your medicines exactly as prescribed. Call your doctor if you think you are having a problem with your medicine. You may get medicine for nausea and vomiting if you have these side effects. · Follow your doctor's instructions to relieve pain. Pain from cancer and surgery can almost always be controlled. Use pain medicine when you first notice pain, before it becomes severe. · Eat healthy food. If you do not feel like eating, try to eat food that has protein and extra calories to keep up your strength and prevent weight loss. Drink liquid meal replacements for extra calories and protein. Try to eat your main meal early. · Get some physical activity every day, but do not get too tired. Keep doing the hobbies you enjoy as your energy allows. · Do not smoke. Smoking can make your cancer worse. If you need help quitting, talk to your doctor about stop-smoking programs and medicines.  These can increase your chances of quitting for good. · Take steps to control your stress and workload. Learn relaxation techniques. ¨ Share your feelings. Stress and tension affect our emotions. By expressing your feelings to others, you may be able to understand and cope with them. ¨ Consider joining a support group. Talking about a problem with your spouse, a good friend, or other people with similar problems is a good way to reduce tension and stress. ¨ Express yourself through art. Try writing, crafts, dance, or art to relieve stress. Some dance, writing, or art groups may be available just for people who have cancer. ¨ Be kind to your body and mind. Getting enough sleep, eating a healthy diet, and taking time to do things you enjoy can contribute to an overall feeling of balance in your life and can help reduce stress. ¨ Get help if you need it. Discuss your concerns with your doctor or counselor. · If you are vomiting or have diarrhea:  ¨ Drink plenty of fluids (enough so that your urine is light yellow or clear like water) to prevent dehydration. Choose water and other caffeine-free clear liquids. If you have kidney, heart, or liver disease and have to limit fluids, talk with your doctor before you increase the amount of fluids you drink. ¨ When you are able to eat, try clear soups, mild foods, and liquids until all symptoms are gone for 12 to 48 hours. Other good choices include dry toast, crackers, cooked cereal, and gelatin dessert, such as Jell-O.  · If you have not already done so, prepare a list of advance directives. Advance directives are instructions to your doctor and family members about what kind of care you want if you become unable to speak or express yourself. When should you call for help? Call 911 anytime you think you may need emergency care. For example, call if:  ? · You passed out (lost consciousness). ?Call your doctor now or seek immediate medical care if:  ? · You have a fever. ? · You have abnormal bleeding. ? · You think you have an infection. ? · You have new or worse pain. ? · You have new symptoms, such as a cough, belly pain, vomiting, diarrhea, or a rash. ? Watch closely for changes in your health, and be sure to contact your doctor if:  ? · You are much more tired than usual.   ? · You have swollen glands in your armpits, groin, or neck. ? · You do not get better as expected. Where can you learn more? Go to http://catalino-tiesha.info/. Enter V321 in the search box to learn more about \"Breast Cancer: Care Instructions. \"  Current as of: May 12, 2017  Content Version: 11.4  © 4058-3972 [employer]. Care instructions adapted under license by [employer] (which disclaims liability or warranty for this information). If you have questions about a medical condition or this instruction, always ask your healthcare professional. [address] any warranty or liability for your use of this information.

## 2018-06-11 NOTE — PROGRESS NOTES
Hematology/Oncology  Progress Note    Name: Boy Alejandro  Date: 2018  : 1960    PCP: Vinicius Cheung MD     Ms. Ban Frias is a 62 y.o. -American woman with HER-2 positive invasive ductal adenocarcinoma the right breast    Current therapy: Carboplatin, Taxotere, and Herceptin plus Perjeda. (Cycle 3 will be started on 5/15/2018)    Subjective:     Ms. Ban Frias is a 54-year-old -American woman who is currently receiving neoadjuvant systemic therapy for her HER-2 positive invasive ductal adenocarcinoma the right breast.  On 5/15/2018 she completed cycle 3 of her treatment. The patient reports that she tolerated the medication well. She did not experience any significant nausea or emesis. She has experienced a mild degree of fatigue. Unfortunately, the patient did not come in for her cycle 4 as scheduled. Apparently she was having some problems with transportation. Past medical history, family history, and social history: these were reviewed and remains unchanged. Past Medical History:   Diagnosis Date    Cancer [facility])     right breast    Hepatitis B     Tuberculosis      Past Surgical History:   Procedure Laterality Date    HX GYN      hysterectomy     HX OTHER SURGICAL      neck surgery     [place] INSJ PRPH CTR VAD W/SUBQ PORT AGE 5 YR/> N/A 2018    Dr. Beryl Samaniego History     Social History    Marital status: SINGLE     Spouse name: N/A    Number of children: N/A    Years of education: N/A     Occupational History    Not on file.      Social History Main Topics    Smoking status: Never Smoker    Smokeless tobacco: Current User    Alcohol use 1.2 oz/week     2 Cans of beer per week      Comment: weekly     Drug use: No    Sexual activity: No     Other Topics Concern    Not on file     Social History Narrative     Family History   Problem Relation Age of Onset    Stroke Mother     Heart Disease Father     Cancer Maternal Grandmother      Current Outpatient Prescriptions   Medication Sig Dispense Refill    dexamethasone (DECADRON) 4 mg tablet Take 8 mg once po daily the day before Chemotherapy, the day of chemo and the day after chemo 24 Tab 3    HYDROcodone-acetaminophen (NORCO) 5-325 mg per tablet Take 1 Tab by mouth every six (6) hours as needed for Pain. Max Daily Amount: 4 Tabs. 60 Tab 0    docusate sodium (COLACE) 100 mg capsule Take 1 Cap by mouth two (2) times a day for 90 days. 60 Cap 2    famotidine (PEPCID) 40 mg tablet Take 1 Tab by mouth daily. 30 Tab 1    potassium chloride SA (MICRO-K) 10 mEq capsule Take 1 Cap by mouth daily. 30 Cap 0    calcium carbonate (CALCIUM 500) 500 mg calcium (1,250 mg) chewable tablet Take 1 Tab by mouth two (2) times a day. Indications: hypocalcemia 60 Tab 1    prochlorperazine (COMPAZINE) 10 mg tablet Take one tab po every 6 hours with benadryl 25 mg starting the night of chemo and for three days then PRN 60 Tab 2    lidocaine-prilocaine (EMLA) topical cream Apply a small amount to port area one hour prior to chemo and cover with saran wrap 30 g 3    warfarin (COUMADIN) 1 mg tablet Take one tab po daily at 6 pm or after 30 Tab 6    ondansetron hcl (ZOFRAN, AS HYDROCHLORIDE,) 8 mg tablet Take one tab every 8 hours for nausea starting the night of chemo and for three days 30 Tab 3    cephALEXin (KEFLEX) 250 mg capsule Take 250 mg by mouth two (2) times a day.  sucralfate (CARAFATE) 1 gram tablet 1 g. Review of Systems  Constitutional: The patient has complaints of a mild degree of fatigue and some weakness following chemotherapy. She has a noticeable increase in her food intake due to increasing appetite following chemotherapy. HEENT: The patient denies recent head trauma, eye pain, blurred vision,  hearing deficit, oropharyngeal mucosal pain or lesions, and the patient denies throat pain or discomfort. Lymphatics: The patient denies palpable peripheral lymphadenopathy.   Hematologic: The patient denies having bruising, bleeding, or progressive fatigue. Respiratory: Patient denies having shortness of breath, cough, sputum production, fever, or dyspnea on exertion. Cardiovascular: The patient denies having leg pain, leg swelling, heart palpitations, chest permit, chest pain, or lightheadedness. The patient denies having dyspnea on exertion. Gastrointestinal: The patient denies having nausea, emesis, or diarrhea. The patient denies having any hematemesis or blood in the stool. Genitourinary: Patient denies having urinary urgency, frequency, or dysuria. The patient denies having blood in the urine. Psychological: The patient denies having symptoms of nervousness, anxiety, depression, or thoughts of harming self. Skin: Patient denies having skin rashes, skin, ulcerations, or unexplained itching or pruritus. Musculoskeletal: The patient denies having pain in the joints or bones. Objective:     Visit Vitals    /87    Pulse 94    Temp 98.1 °F (36.7 °C) (Oral)    Wt 51.3 kg (113 lb)    BMI 18.83 kg/m2     ECOG PS=0    Physical Exam:   Gen. Appearance: The patient is in no acute distress. Skin: There is no bruise or rash. HEENT: The exam is unremarkable. Neck: Supple without lymphadenopathy or thyromegaly. Lungs: Clear to auscultation and percussion; there are no wheezes or rhonchi. Heart: Regular rate and rhythm; there are no murmurs, gallops, or rubs. Anterior chest wall and breast: There is no change in the exam.  Abdomen: Bowel sounds are present and normal.  There is no guarding, tenderness, or hepatosplenomegaly. Extremities: There is no clubbing, cyanosis, or edema. Neurologic: There are no focal neurologic deficits. Lymphatics: There is no palpable peripheral lymphadenopathy. Musculoskeletal: The patient has full range of motion at all joints. There is no evidence of joint deformity or effusions. There is no focal joint tenderness.   Psychological/psychiatric: There is no clinical evidence of anxiety, depression, or melancholy. Lab data:      Results for orders placed or performed during the hospital encounter of 06/11/18   CBC WITH 3 PART DIFF     Status: Abnormal   Result Value Ref Range Status    WBC 6.9 4.5 - 13.0 K/uL Final    RBC 3.29 (L) 4.10 - 5.10 M/uL Final    HGB 10.3 (L) 12.0 - 16 g/dL Final    HCT 32.0 (L) 36 - 48 % Final    MCV 97.3 78 - 102 FL Final    MCH 31.3 25.0 - 35.0 PG Final    MCHC 32.2 31 - 37 g/dL Final    RDW 19.2 (H) 11.5 - 14.5 % Final    PLATELET 68 (L) 259 - 440 K/uL Final    NEUTROPHILS 92 (H) 40 - 70 % Final    MIXED CELLS 1 0.1 - 17 % Final    LYMPHOCYTES 7 (L) 14 - 44 % Final    ABS. NEUTROPHILS 6.3 1.8 - 9.5 K/UL Final    ABS. MIXED CELLS 0.1 0.0 - 2.3 K/uL Final    ABS. LYMPHOCYTES 0.5 (L) 1.1 - 5.9 K/UL Final     Comment: Test performed at [place] [address] Location. Results Reviewed by Medical Director. DF AUTOMATED   Final           Assessment:     1. Malignant neoplasm of upper-outer quadrant of right breast in female, estrogen receptor negative ([facility] [address]    2. Antineoplastic chemotherapy induced anemia    3. Fatigue due to treatment    4. Gastroesophageal reflux disease without esophagitis      Plan:   Invasive ductal adenocarcinoma right upper quadrant of the breast: Cycle #4 of her treatment program with carboplatin based regimen and Herceptin based regimen will be rescheduled and provided on 6/19/2018. Chemotherapy-induced anemia: I will check her iron profile and ferritin levels at this time. I have instructed the patient to begin taking Slow Fe 1 tablet daily. She will also be started on Procrit 60,000 unit with her chemotherapy treatment. Her hemoglobin today is 9.6 g/dl and hematocrit is 28.4%.      Fatigue due to treatment: pt will continue to stay well-hydrated particularly on the weeks of chemotherapy and get  Lots of rest.     Follow-up in 3 weeks  Orders Placed This Encounter    COMPLETE CBC & AUTO DIFF WBC    InHouse CBC ([facility])     Standing Status:   Future     Number of Occurrences:   1     Standing Expiration Date:   6/18/2018       Linda Santos MD  5/14/2018      I have assessed the patient independently and  agree with the full assessment as outlined. Rachel Rizzo MD, FACP      Please note: This document has been produced using voice recognition software. Unrecognized errors in transcription may be present.

## 2018-06-11 NOTE — MR AVS SNAPSHOT
Latoya Virgen 
 
 
 [address], [place] [address] [facility] 
[phone] Patient: Nelson Montes De Oca MRN: [id number] JOSEY:[birth year] Visit Information Date & Time Provider Department Dept. Phone Encounter #  
 6/11/2018 11:00 AM Vivienne Ray MD [facility] [phone] [id number] Follow-up Instructions Return in about 3 weeks (around 7/2/2018). Your Appointments 6/11/2018 11:00 AM  
Office Visit with Vivienne Ray MD  
[facility] [facility] Appt Note: 4 weeks [address], [place] [address] [facility] 250 [facility]  
  
   
 [address], [address] [facility] 200 [facility]  
  
    
 7/9/2018  1:45 PM  
Office Visit with Vivienne Ray MD  
[facility] Appt Note: 3 weeks [address], [place] [address] [facility]  
[phone] 7/13/2018  2:00 PM  
Follow Up with [facility] [address] ([facility]) Appt Note: 4 month f/up [address] [place] [address] [address] [facility] Upcoming Health Maintenance Date Due Pneumococcal 19-64 Highest Risk (1 of 3 - PCV13) 9/5/1979 DTaP/Tdap/Td series (1 - Tdap) 9/5/1981 PAP AKA CERVICAL CYTOLOGY 9/5/1981 BREAST CANCER SCRN MAMMOGRAM 9/5/2010 FOBT Q 1 YEAR AGE 50-75 9/5/2010 Influenza Age 5 to Adult 8/1/2018 Allergies as of 6/11/2018  Review Complete On: 6/11/2018 By: Vivienne Ray MD  
 No Known Allergies Current Immunizations  Reviewed on 5/21/2018 No immunizations on file. Not reviewed this visit You Were Diagnosed With   
  
 Codes Comments  Malignant neoplasm of upper-outer quadrant of right breast in female, estrogen receptor negative ([facility]    -  Primary ICD-10-CM: C50.411, Z17.1 ICD-9-CM: 174.4, V86.1 Antineoplastic chemotherapy induced anemia     ICD-10-CM: D64.81, T45.1X5A 
ICD-9-CM: 285.3, E933.1 Fatigue due to treatment     ICD-10-CM: R53.83 ICD-9-CM: 780.79 Gastroesophageal reflux disease without esophagitis     ICD-10-CM: K21.9 ICD-9-CM: 530.81 Vitals BP Pulse Temp Weight(growth percentile) BMI OB Status 149/87 94 98.1 °F (36.7 °C) (Oral) 113 lb (51.3 kg) 18.83 kg/m2 Hysterectomy Smoking Status Never Smoker BMI and BSA Data Body Mass Index Body Surface Area  
 18.83 kg/m 2 1.53 m 2 Preferred Pharmacy Pharmacy Name Phone [address] [facility], [address] [employer] [phone] Your Updated Medication List  
  
   
This list is accurate as of 6/11/18 10:13 AM.  Always use your most recent med list.  
  
  
  
  
 calcium carbonate 500 mg calcium (1,250 mg) chewable tablet Commonly known as:  CALCIUM 500 Take 1 Tab by mouth two (2) times a day. Indications: hypocalcemia  
  
 dexamethasone 4 mg tablet Commonly known as:  DECADRON Take 8 mg once po daily the day before Chemotherapy, the day of chemo and the day after chemo  
  
 docusate sodium 100 mg capsule Commonly known as:  Vernard Carlito Take 1 Cap by mouth two (2) times a day for 90 days. famotidine 40 mg tablet Commonly known as:  PEPCID Take 1 Tab by mouth daily. HYDROcodone-acetaminophen 5-325 mg per tablet Commonly known as:  [place] Take 1 Tab by mouth every six (6) hours as needed for Pain. Max Daily Amount: 4 Tabs. KEFLEX 250 mg capsule Generic drug:  cephALEXin Take 250 mg by mouth two (2) times a day. lidocaine-prilocaine topical cream  
Commonly known as:  EMLA Apply a small amount to port area one hour prior to chemo and cover with saran wrap  
  
 ondansetron hcl 8 mg tablet Commonly known as:  Katelynn Johnson  
 Take one tab every 8 hours for nausea starting the night of chemo and for three days  
  
 potassium chloride SA 10 mEq capsule Commonly known as:  Arvell Windy Take 1 Cap by mouth daily. prochlorperazine 10 mg tablet Commonly known as:  COMPAZINE Take one tab po every 6 hours with benadryl 25 mg starting the night of chemo and for three days then PRN  
  
 sucralfate 1 gram tablet Commonly known as:  CARAFATE  
1 g.  
  
 warfarin 1 mg tablet Commonly known as:  COUMADIN Take one tab po daily at 6 pm or after We Performed the Following COMPLETE CBC & AUTO DIFF WBC [97467 CPT(R)] Follow-up Instructions Return in about 3 weeks (around 7/2/2018). To-Do List   
 06/11/2018 Lab:  CBC WITH 3 PART DIFF   
  
 06/11/2018 Lab:  FERRITIN   
  
 06/11/2018 Lab:  IRON PROFILE   
  
 06/11/2018 Lab:  METABOLIC PANEL, COMPREHENSIVE   
  
 06/19/2018 9:00 AM  
  Appointment with [facility] NURSE 1 at [facility] ([phone])  
  
 07/17/2018 1:30 PM  
  Appointment with [facility] FAST TRACK NURSE at [facility] ([phone]) Patient Instructions Breast Cancer: Care Instructions Your Care Instructions Breast cancer occurs when abnormal cells grow out of control in the breast. These cancer cells can spread within the breast, to nearby lymph nodes and other tissues, and to other parts of the body. Being treated for cancer can weaken your body, and you may feel very tired. Get the rest your body needs so you can feel better. Finding out that you have cancer is scary. You may feel many emotions and may need some help coping. Seek out family, friends, and counselors for support. You also can do things at home to make yourself feel better while you go through treatment. Call the [employer]ine (0-828.284.7847) or visit its website at [address] [employer]. org for more information. Follow-up care is a key part of your treatment and safety. Be sure to make and go to all appointments, and call your doctor if you are having problems. It's also a good idea to know your test results and keep a list of the medicines you take. How can you care for yourself at home? · Take your medicines exactly as prescribed. Call your doctor if you think you are having a problem with your medicine. You may get medicine for nausea and vomiting if you have these side effects. · Follow your doctor's instructions to relieve pain. Pain from cancer and surgery can almost always be controlled. Use pain medicine when you first notice pain, before it becomes severe. · Eat healthy food. If you do not feel like eating, try to eat food that has protein and extra calories to keep up your strength and prevent weight loss. Drink liquid meal replacements for extra calories and protein. Try to eat your main meal early. · Get some physical activity every day, but do not get too tired. Keep doing the hobbies you enjoy as your energy allows. · Do not smoke. Smoking can make your cancer worse. If you need help quitting, talk to your doctor about stop-smoking programs and medicines. These can increase your chances of quitting for good. · Take steps to control your stress and workload. Learn relaxation techniques. ¨ Share your feelings. Stress and tension affect our emotions. By expressing your feelings to others, you may be able to understand and cope with them. ¨ Consider joining a support group. Talking about a problem with your spouse, a good friend, or other people with similar problems is a good way to reduce tension and stress. ¨ Express yourself through art. Try writing, crafts, dance, or art to relieve stress. Some dance, writing, or art groups may be available just for people who have cancer. ¨ Be kind to your body and mind.  Getting enough sleep, eating a healthy diet, and taking time to do things you enjoy can contribute to an overall feeling of balance in your life and can help reduce stress. ¨ Get help if you need it. Discuss your concerns with your doctor or counselor. · If you are vomiting or have diarrhea: ¨ Drink plenty of fluids (enough so that your urine is light yellow or clear like water) to prevent dehydration. Choose water and other caffeine-free clear liquids. If you have kidney, heart, or liver disease and have to limit fluids, talk with your doctor before you increase the amount of fluids you drink. ¨ When you are able to eat, try clear soups, mild foods, and liquids until all symptoms are gone for 12 to 48 hours. Other good choices include dry toast, crackers, cooked cereal, and gelatin dessert, such as Jell-O. · If you have not already done so, prepare a list of advance directives. Advance directives are instructions to your doctor and family members about what kind of care you want if you become unable to speak or express yourself. When should you call for help? Call 911 anytime you think you may need emergency care. For example, call if: 
? · You passed out (lost consciousness). ?Call your doctor now or seek immediate medical care if: 
? · You have a fever. ? · You have abnormal bleeding. ? · You think you have an infection. ? · You have new or worse pain. ? · You have new symptoms, such as a cough, belly pain, vomiting, diarrhea, or a rash. ? Watch closely for changes in your health, and be sure to contact your doctor if: 
? · You are much more tired than usual.  
? · You have swollen glands in your armpits, groin, or neck. ? · You do not get better as expected. Where can you learn more? Go to http://catalino-tiesha.info/. Enter V321 in the search box to learn more about \"Breast Cancer: Care Instructions. \" Current as of: May 12, 2017 Content Version: 11.4 © 7197-3696 Healthwise, Incorporated. Care instructions adapted under license by [employer] (which disclaims liability or warranty for this information). If you have questions about a medical condition or this instruction, always ask your healthcare professional. [address] any warranty or liability for your use of this information. Introducing [facility] [place] [facility]! L[employer] introduces WildBlue patient portal. Now you can access parts of your medical record, email your doctor's office, and request medication refills online. 1. In your internet browser, go to [email or web address] 2. Click on the First Time User? Click Here link in the Sign In box. You will see the New Member Sign Up page. 3. Enter your WildBlue Access Code exactly as it appears below. You will not need to use this code after youve completed the sign-up process. If you do not sign up before the expiration date, you must request a new code. · WildBlue Access Code: [email or web address] Expires: 8/12/2018 12:07 PM 
 
4. Enter the last four digits of your Social Security Number (xxxx) and Date of Birth (mm/dd/yyyy) as indicated and click Submit. You will be taken to the next sign-up page. 5. Create a WildBlue ID. This will be your WildBlue login ID and cannot be changed, so think of one that is secure and easy to remember. 6. Create a WildBlue password. You can change your password at any time. 7. Enter your Password Reset Question and Answer. This can be used at a later time if you forget your password. 8. Enter your e-mail address. You will receive e-mail notification when new information is available in [address] 9. Click Sign Up. You can now view and download portions of your medical record. 10. Click the Download Summary menu link to download a portable copy of your medical information.  
 
If you have questions, please visit the Frequently Asked Questions section of the MessageCast. Remember, Context apphart is NOT to be used for urgent needs. For medical emergencies, dial 911. Now available from your iPhone and Android! Please provide this summary of care documentation to your next provider. Your primary care clinician is listed as Kalpesh Grewal. If you have any questions after today's visit, please call [phone].

## 2018-06-12 RX ORDER — PALONOSETRON 0.05 MG/ML
0.25 INJECTION, SOLUTION INTRAVENOUS ONCE
Status: CANCELLED | OUTPATIENT
Start: 2018-06-19 | End: 2018-06-19

## 2018-06-18 ENCOUNTER — APPOINTMENT (OUTPATIENT)
Dept: INFUSION THERAPY | Age: 58
End: 2018-06-18

## 2018-06-19 ENCOUNTER — HOSPITAL ENCOUNTER (OUTPATIENT)
Dept: INFUSION THERAPY | Age: 58
End: 2018-06-19

## 2018-06-20 DIAGNOSIS — G89.18 POSTOPERATIVE PAIN: ICD-10-CM

## 2018-06-20 DIAGNOSIS — E87.6 HYPOKALEMIA: ICD-10-CM

## 2018-06-20 RX ORDER — POTASSIUM CHLORIDE 750 MG/1
10 CAPSULE, EXTENDED RELEASE ORAL DAILY
Qty: 30 CAP | Refills: 0 | Status: SHIPPED | OUTPATIENT
Start: 2018-06-20 | End: 2018-07-25 | Stop reason: SDUPTHER

## 2018-06-20 RX ORDER — DEXAMETHASONE 4 MG/1
TABLET ORAL
Qty: 24 TAB | Refills: 3 | Status: SHIPPED | OUTPATIENT
Start: 2018-06-20 | End: 2018-10-16

## 2018-06-20 RX ORDER — FAMOTIDINE 40 MG/1
40 TABLET, FILM COATED ORAL DAILY
Qty: 30 TAB | Refills: 1 | Status: SHIPPED | OUTPATIENT
Start: 2018-06-20 | End: 2018-10-16

## 2018-06-20 RX ORDER — DOCUSATE SODIUM 100 MG/1
100 CAPSULE, LIQUID FILLED ORAL 2 TIMES DAILY
Qty: 60 CAP | Refills: 2 | Status: SHIPPED | OUTPATIENT
Start: 2018-06-20 | End: 2018-09-18

## 2018-06-20 RX ORDER — HYDROCODONE BITARTRATE AND ACETAMINOPHEN 5; 325 MG/1; MG/1
1 TABLET ORAL
Qty: 60 TAB | Refills: 0 | Status: SHIPPED | OUTPATIENT
Start: 2018-06-20 | End: 2018-10-16

## 2018-06-26 ENCOUNTER — APPOINTMENT (OUTPATIENT)
Dept: INFUSION THERAPY | Age: 58
End: 2018-06-26

## 2018-07-10 ENCOUNTER — APPOINTMENT (OUTPATIENT)
Dept: INFUSION THERAPY | Age: 58
End: 2018-07-10
Payer: MEDICAID

## 2018-07-13 ENCOUNTER — HOSPITAL ENCOUNTER (OUTPATIENT)
Dept: INFUSION THERAPY | Age: 58
Discharge: HOME | End: 2018-07-13
Payer: MEDICAID

## 2018-07-13 VITALS
WEIGHT: 119.5 LBS | HEART RATE: 75 BPM | BODY MASS INDEX: 19.91 KG/M2 | RESPIRATION RATE: 18 BRPM | SYSTOLIC BLOOD PRESSURE: 125 MMHG | HEIGHT: 65 IN | OXYGEN SATURATION: 100 % | TEMPERATURE: 97.7 F | DIASTOLIC BLOOD PRESSURE: 81 MMHG

## 2018-07-13 LAB
ALBUMIN SERPL-MCNC: 3.2 G/DL (ref 3.4–5)
ALBUMIN/GLOB SERPL: 0.9 {RATIO} (ref 0.8–1.7)
ALP SERPL-CCNC: 65 U/L (ref 45–117)
ALT SERPL-CCNC: 19 U/L (ref 13–56)
ANION GAP BLD CALC-SCNC: 14 MMOL/L (ref 10–20)
AST SERPL-CCNC: 18 U/L (ref 15–37)
BASO+EOS+MONOS # BLD AUTO: 0.3 K/UL (ref 0–2.3)
BASO+EOS+MONOS # BLD AUTO: 9 % (ref 0.1–17)
BILIRUB DIRECT SERPL-MCNC: <0.1 MG/DL (ref 0–0.2)
BILIRUB SERPL-MCNC: 0.3 MG/DL (ref 0.2–1)
BUN BLD-MCNC: 9 MG/DL (ref 7–18)
CA-I BLD-MCNC: 1.22 MMOL/L (ref 1.12–1.32)
CHLORIDE BLD-SCNC: 105 MMOL/L (ref 100–108)
CO2 BLD-SCNC: 26 MMOL/L (ref 19–24)
CREAT UR-MCNC: 0.8 MG/DL (ref 0.6–1.3)
DIFFERENTIAL METHOD BLD: ABNORMAL
ERYTHROCYTE [DISTWIDTH] IN BLOOD BY AUTOMATED COUNT: 15.5 % (ref 11.5–14.5)
GLOBULIN SER CALC-MCNC: 3.7 G/DL (ref 2–4)
GLUCOSE BLD STRIP.AUTO-MCNC: 93 MG/DL (ref 74–106)
HCT VFR BLD AUTO: 33 % (ref 36–48)
HCT VFR BLD CALC: 33 % (ref 36–49)
HGB BLD-MCNC: 11.2 G/DL (ref 12–16)
HGB BLD-MCNC: 11.2 G/DL (ref 12–16)
LYMPHOCYTES # BLD: 1.4 K/UL (ref 1.1–5.9)
LYMPHOCYTES NFR BLD: 38 % (ref 14–44)
MCH RBC QN AUTO: 32.8 PG (ref 25–35)
MCHC RBC AUTO-ENTMCNC: 33.9 G/DL (ref 31–37)
MCV RBC AUTO: 96.8 FL (ref 78–102)
NEUTS SEG # BLD: 1.9 K/UL (ref 1.8–9.5)
NEUTS SEG NFR BLD: 53 % (ref 40–70)
PLATELET # BLD AUTO: 249 K/UL (ref 140–440)
POTASSIUM BLD-SCNC: 4.1 MMOL/L (ref 3.5–5.5)
PROT SERPL-MCNC: 6.9 G/DL (ref 6.4–8.2)
RBC # BLD AUTO: 3.41 M/UL (ref 4.1–5.1)
SODIUM BLD-SCNC: 140 MMOL/L (ref 136–145)
WBC # BLD AUTO: 3.6 K/UL (ref 4.5–13)

## 2018-07-13 PROCEDURE — 74011250636 HC RX REV CODE- 250/636

## 2018-07-13 PROCEDURE — 96375 TX/PRO/DX INJ NEW DRUG ADDON: CPT

## 2018-07-13 PROCEDURE — 80076 HEPATIC FUNCTION PANEL: CPT | Performed by: INTERNAL MEDICINE

## 2018-07-13 PROCEDURE — 96417 CHEMO IV INFUS EACH ADDL SEQ: CPT

## 2018-07-13 PROCEDURE — 85025 COMPLETE CBC W/AUTO DIFF WBC: CPT | Performed by: INTERNAL MEDICINE

## 2018-07-13 PROCEDURE — 74011250636 HC RX REV CODE- 250/636: Performed by: INTERNAL MEDICINE

## 2018-07-13 PROCEDURE — 80047 BASIC METABLC PNL IONIZED CA: CPT

## 2018-07-13 PROCEDURE — 96367 TX/PROPH/DG ADDL SEQ IV INF: CPT

## 2018-07-13 PROCEDURE — 74011000258 HC RX REV CODE- 258: Performed by: INTERNAL MEDICINE

## 2018-07-13 PROCEDURE — 96377 APPLICATON ON-BODY INJECTOR: CPT

## 2018-07-13 PROCEDURE — 96413 CHEMO IV INFUSION 1 HR: CPT

## 2018-07-13 PROCEDURE — 77030012965 HC NDL HUBR BBMI -A

## 2018-07-13 RX ORDER — PALONOSETRON 0.05 MG/ML
0.25 INJECTION, SOLUTION INTRAVENOUS ONCE
Status: COMPLETED | OUTPATIENT
Start: 2018-07-13 | End: 2018-07-13

## 2018-07-13 RX ORDER — SODIUM CHLORIDE 9 MG/ML
50 INJECTION, SOLUTION INTRAVENOUS CONTINUOUS
Status: DISPENSED | OUTPATIENT
Start: 2018-07-13 | End: 2018-07-14

## 2018-07-13 RX ORDER — SODIUM CHLORIDE 0.9 % (FLUSH) 0.9 %
10-40 SYRINGE (ML) INJECTION AS NEEDED
Status: DISCONTINUED | OUTPATIENT
Start: 2018-07-13 | End: 2018-07-17 | Stop reason: HOSPADM

## 2018-07-13 RX ORDER — HEPARIN 100 UNIT/ML
500 SYRINGE INTRAVENOUS AS NEEDED
Status: DISCONTINUED | OUTPATIENT
Start: 2018-07-13 | End: 2018-07-17 | Stop reason: HOSPADM

## 2018-07-13 RX ADMIN — SODIUM CHLORIDE 50 ML/HR: 900 INJECTION, SOLUTION INTRAVENOUS at 10:21

## 2018-07-13 RX ADMIN — TRASTUZUMAB 320 MG: 150 INJECTION, POWDER, LYOPHILIZED, FOR SOLUTION INTRAVENOUS at 11:33

## 2018-07-13 RX ADMIN — PALONOSETRON HYDROCHLORIDE 0.25 MG: 0.25 INJECTION INTRAVENOUS at 11:23

## 2018-07-13 RX ADMIN — DEXAMETHASONE SODIUM PHOSPHATE 12 MG: 4 INJECTION, SOLUTION INTRA-ARTICULAR; INTRALESIONAL; INTRAMUSCULAR; INTRAVENOUS; SOFT TISSUE at 10:55

## 2018-07-13 RX ADMIN — DOCETAXEL ANHYDROUS 120 MG: 10 INJECTION, SOLUTION INTRAVENOUS at 13:48

## 2018-07-13 RX ADMIN — PEGFILGRASTIM 6 MG: KIT SUBCUTANEOUS at 16:17

## 2018-07-13 RX ADMIN — SODIUM CHLORIDE 150 MG: 900 INJECTION, SOLUTION INTRAVENOUS at 10:23

## 2018-07-13 RX ADMIN — CARBOPLATIN 540 MG: 10 INJECTION, SOLUTION INTRAVENOUS at 14:56

## 2018-07-13 RX ADMIN — Medication 500 UNITS: at 16:18

## 2018-07-13 RX ADMIN — Medication 20 ML: at 09:30

## 2018-07-13 RX ADMIN — Medication 30 ML: at 16:18

## 2018-07-13 RX ADMIN — PERTUZUMAB 420 MG: 30 INJECTION, SOLUTION, CONCENTRATE INTRAVENOUS at 12:17

## 2018-07-13 NOTE — PROGRESS NOTES
[facility] Progress Note    Date: 2018    Name: Nancy Cates              MRN: [id number]              : 1960    Chemotherapy Cycle: C4 of 6 Herceptin, Perjeta, Taxotere, Carboplatin and Procrit every 4 weeks      Ms. Madeleine Reveles was assessed and education was provided. Patient was in the hospital in  and has not had chemotherapy since May. Sky Szymanski, NP notified, order received to proceed with chemotherapy. Ms. Ventura's vitals were reviewed. Visit Vitals    /88 (BP 1 Location: Right arm, BP Patient Position: Sitting)    Pulse 88    Temp 98.2 °F (36.8 °C)    Resp 18    Ht 5' 5\" (1.651 m)    Wt 54.2 kg (119 lb 8 oz)    SpO2 100%    BMI 19.89 kg/m2     Patient Vitals for the past 12 hrs:   Temp Pulse Resp BP SpO2   18 1352 97.7 °F (36.5 °C) 75 18 125/81 -   18 0919 98.2 °F (36.8 °C) 88 18 132/88 100 %       Medial port of dual Mediport at left upper chest accessed with 20 gauge, 1 inch Branch needle. Good blood return obtained and labs drawn after 8 ml discard. Port flushed with 20 ml NS. Lab results were obtained and reviewed. Recent Results (from the past 12 hour(s))   CBC WITH 3 PART DIFF    Collection Time: 18  9:35 AM   Result Value Ref Range    WBC 3.6 (L) 4.5 - 13.0 K/uL    RBC 3.41 (L) 4.10 - 5.10 M/uL    HGB 11.2 (L) 12.0 - 16 g/dL    HCT 33.0 (L) 36 - 48 %    MCV 96.8 78 - 102 FL    MCH 32.8 25.0 - 35.0 PG    MCHC 33.9 31 - 37 g/dL    RDW 15.5 (H) 11.5 - 14.5 %    PLATELET 544 319 - 538 K/uL    NEUTROPHILS 53 40 - 70 %    MIXED CELLS 9 0.1 - 17 %    LYMPHOCYTES 38 14 - 44 %    ABS. NEUTROPHILS 1.9 1.8 - 9.5 K/UL    ABS. MIXED CELLS 0.3 0.0 - 2.3 K/uL    ABS.  LYMPHOCYTES 1.4 1.1 - 5.9 K/UL    DF AUTOMATED     POC CHEM8    Collection Time: 18  9:37 AM   Result Value Ref Range    CO2, POC 26 (H) 19 - 24 MMOL/L    Glucose, POC 93 74 - 106 MG/DL    BUN, POC 9 7 - 18 MG/DL    Creatinine, POC 0.8 0.6 - 1.3 MG/DL    GFRAA, POC >60 >60 ml/min/1.73m2 GFRNA, POC >60 >60 ml/min/1.73m2    Sodium,  136 - 145 MMOL/L    Potassium, POC 4.1 3.5 - 5.5 MMOL/L    Calcium, ionized (POC) 1.22 1.12 - 1.32 mmol/L    Chloride,  100 - 108 MMOL/L    Anion gap, POC 14 10 - 20      Hematocrit, POC 33 (L) 36 - 49 %    Hemoglobin, POC 11.2 (L) 12 - 16 G/DL     Procrit ordered for Hgb less than 10 or Hct less than 30. Procrit held today for Hgb 11.2 and Hct 33. Pre-medications of Emend 150 mg/150 ml IVPB, Dexamethasone 12 mg/50 ml IVPB, Aloxi 0.25 mg IVP were administered as ordered and chemotherapy was initiated. Herceptin 320 mg/290 ml was infused at 500 ml/hr. No s/s reaction noted. Perjeta 420 mg/289 ml was infused at 528 ml/hr. Patient observed for 30 minutes after completion of Perjeta infusion prior to being Taxol infusion. No s/s reaction noted. Taxotere 120 mg/282 ml was infused at 282 ml/hr. No s/s reaction noted    Carboplatin 54 mg/329 ml was infused at 329 ml/hr. No s/s reaction noted. Port flushed with 30 ml NS and 5 ml of 100 units/ml Heparin, then de-accessed. No irritation or drainage noted at site, band aid applied. Neulasta 6 mg subcutaneous On Body Injector applied to back of patient's upper right arm at 1617. Verified that patietn was able to verbalize care and removeal of OBI. OBI should inject at approximately 1917 on 7/14/2018. Ms. Lisa Willis tolerated infusion, and had no complaints at this time. Armband removed and shredded. Ms. Lisa Willis was discharged from [place] [address] in stable condition at [phone]. She is to return on 8/3/2018 at 0900 for her next appointment for labs, chemotherapy and Procrit.     Jaylyn Harrington RN  July 13, 2018  10:38 AM

## 2018-07-17 ENCOUNTER — APPOINTMENT (OUTPATIENT)
Dept: INFUSION THERAPY | Age: 58
End: 2018-07-17
Payer: MEDICAID

## 2018-07-20 ENCOUNTER — HOSPITAL ENCOUNTER (OUTPATIENT)
Dept: INFUSION THERAPY | Age: 58
End: 2018-07-20
Payer: MEDICAID

## 2018-07-25 DIAGNOSIS — E87.6 HYPOKALEMIA: ICD-10-CM

## 2018-07-25 RX ORDER — POTASSIUM CHLORIDE 750 MG/1
CAPSULE, EXTENDED RELEASE ORAL
Qty: 30 CAP | Refills: 0 | Status: SHIPPED | OUTPATIENT
Start: 2018-07-25 | End: 2018-08-10 | Stop reason: SDUPTHER

## 2018-08-03 ENCOUNTER — HOSPITAL ENCOUNTER (OUTPATIENT)
Dept: INFUSION THERAPY | Age: 58
Discharge: HOME | End: 2018-08-03
Payer: MEDICAID

## 2018-08-03 VITALS
HEART RATE: 69 BPM | HEIGHT: 65 IN | RESPIRATION RATE: 19 BRPM | SYSTOLIC BLOOD PRESSURE: 128 MMHG | OXYGEN SATURATION: 100 % | DIASTOLIC BLOOD PRESSURE: 75 MMHG | BODY MASS INDEX: 19.19 KG/M2 | WEIGHT: 115.2 LBS | TEMPERATURE: 97.9 F

## 2018-08-03 LAB
ALBUMIN SERPL-MCNC: 3 G/DL (ref 3.4–5)
ALBUMIN/GLOB SERPL: 0.9 {RATIO} (ref 0.8–1.7)
ALP SERPL-CCNC: 61 U/L (ref 45–117)
ALT SERPL-CCNC: 19 U/L (ref 13–56)
ANION GAP BLD CALC-SCNC: 16 MMOL/L (ref 10–20)
AST SERPL-CCNC: 16 U/L (ref 15–37)
BASOPHILS # BLD: 0 K/UL (ref 0–0.1)
BASOPHILS NFR BLD: 0 % (ref 0–2)
BILIRUB DIRECT SERPL-MCNC: <0.1 MG/DL (ref 0–0.2)
BILIRUB SERPL-MCNC: 0.4 MG/DL (ref 0.2–1)
BUN BLD-MCNC: 14 MG/DL (ref 7–18)
CA-I BLD-MCNC: 1.19 MMOL/L (ref 1.12–1.32)
CHLORIDE BLD-SCNC: 107 MMOL/L (ref 100–108)
CO2 BLD-SCNC: 23 MMOL/L (ref 19–24)
CREAT UR-MCNC: 0.5 MG/DL (ref 0.6–1.3)
DIFFERENTIAL METHOD BLD: ABNORMAL
EOSINOPHIL # BLD: 0 K/UL (ref 0–0.4)
EOSINOPHIL NFR BLD: 0 % (ref 0–5)
ERYTHROCYTE [DISTWIDTH] IN BLOOD BY AUTOMATED COUNT: 15.3 % (ref 11.6–14.5)
GLOBULIN SER CALC-MCNC: 3.5 G/DL (ref 2–4)
GLUCOSE BLD STRIP.AUTO-MCNC: 88 MG/DL (ref 74–106)
HCT VFR BLD AUTO: 28.1 % (ref 35–45)
HCT VFR BLD CALC: 31 % (ref 36–49)
HGB BLD-MCNC: 10.5 G/DL (ref 12–16)
HGB BLD-MCNC: 9.4 G/DL (ref 12–16)
LYMPHOCYTES # BLD: 0.7 K/UL (ref 0.9–3.6)
LYMPHOCYTES NFR BLD: 12 % (ref 21–52)
MCH RBC QN AUTO: 31.5 PG (ref 24–34)
MCHC RBC AUTO-ENTMCNC: 33.5 G/DL (ref 31–37)
MCV RBC AUTO: 94.3 FL (ref 74–97)
MONOCYTES # BLD: 0.4 K/UL (ref 0.05–1.2)
MONOCYTES NFR BLD: 6 % (ref 3–10)
NEUTS SEG # BLD: 5.1 K/UL (ref 1.8–8)
NEUTS SEG NFR BLD: 82 % (ref 40–73)
PLATELET # BLD AUTO: 161 K/UL (ref 135–420)
PMV BLD AUTO: 8.3 FL (ref 9.2–11.8)
POTASSIUM BLD-SCNC: 4.2 MMOL/L (ref 3.5–5.5)
PROT SERPL-MCNC: 6.5 G/DL (ref 6.4–8.2)
RBC # BLD AUTO: 2.98 M/UL (ref 4.2–5.3)
SODIUM BLD-SCNC: 140 MMOL/L (ref 136–145)
WBC # BLD AUTO: 6.2 K/UL (ref 4.6–13.2)

## 2018-08-03 PROCEDURE — A4216 STERILE WATER/SALINE, 10 ML: HCPCS | Performed by: PHYSICIAN ASSISTANT

## 2018-08-03 PROCEDURE — 85025 COMPLETE CBC W/AUTO DIFF WBC: CPT | Performed by: INTERNAL MEDICINE

## 2018-08-03 PROCEDURE — 77030012965 HC NDL HUBR BBMI -A

## 2018-08-03 PROCEDURE — 96375 TX/PRO/DX INJ NEW DRUG ADDON: CPT

## 2018-08-03 PROCEDURE — 74011000258 HC RX REV CODE- 258: Performed by: INTERNAL MEDICINE

## 2018-08-03 PROCEDURE — 74011250636 HC RX REV CODE- 250/636: Performed by: INTERNAL MEDICINE

## 2018-08-03 PROCEDURE — 36593 DECLOT VASCULAR DEVICE: CPT

## 2018-08-03 PROCEDURE — 80076 HEPATIC FUNCTION PANEL: CPT | Performed by: INTERNAL MEDICINE

## 2018-08-03 PROCEDURE — 96413 CHEMO IV INFUSION 1 HR: CPT

## 2018-08-03 PROCEDURE — 74011250636 HC RX REV CODE- 250/636: Performed by: NURSE PRACTITIONER

## 2018-08-03 PROCEDURE — 80047 BASIC METABLC PNL IONIZED CA: CPT

## 2018-08-03 PROCEDURE — 74011250636 HC RX REV CODE- 250/636: Performed by: PHYSICIAN ASSISTANT

## 2018-08-03 PROCEDURE — 96367 TX/PROPH/DG ADDL SEQ IV INF: CPT

## 2018-08-03 PROCEDURE — 96372 THER/PROPH/DIAG INJ SC/IM: CPT

## 2018-08-03 PROCEDURE — 74011250636 HC RX REV CODE- 250/636

## 2018-08-03 PROCEDURE — 96417 CHEMO IV INFUS EACH ADDL SEQ: CPT

## 2018-08-03 RX ORDER — SODIUM CHLORIDE 9 MG/ML
50 INJECTION, SOLUTION INTRAVENOUS CONTINUOUS
Status: DISPENSED | OUTPATIENT
Start: 2018-08-03 | End: 2018-08-04

## 2018-08-03 RX ORDER — SODIUM CHLORIDE 0.9 % (FLUSH) 0.9 %
10-40 SYRINGE (ML) INJECTION AS NEEDED
Status: DISCONTINUED | OUTPATIENT
Start: 2018-08-03 | End: 2018-08-07 | Stop reason: HOSPADM

## 2018-08-03 RX ORDER — HEPARIN 100 UNIT/ML
500 SYRINGE INTRAVENOUS AS NEEDED
Status: DISCONTINUED | OUTPATIENT
Start: 2018-08-03 | End: 2018-08-07 | Stop reason: HOSPADM

## 2018-08-03 RX ORDER — PALONOSETRON 0.05 MG/ML
0.25 INJECTION, SOLUTION INTRAVENOUS ONCE
Status: COMPLETED | OUTPATIENT
Start: 2018-08-03 | End: 2018-08-03

## 2018-08-03 RX ADMIN — Medication 40 ML: at 09:45

## 2018-08-03 RX ADMIN — ERYTHROPOIETIN 20000 UNITS: 20000 INJECTION, SOLUTION INTRAVENOUS; SUBCUTANEOUS at 12:46

## 2018-08-03 RX ADMIN — SODIUM CHLORIDE 50 ML/HR: 900 INJECTION, SOLUTION INTRAVENOUS at 10:22

## 2018-08-03 RX ADMIN — PERTUZUMAB 420 MG: 30 INJECTION, SOLUTION, CONCENTRATE INTRAVENOUS at 14:12

## 2018-08-03 RX ADMIN — Medication 30 ML: at 09:59

## 2018-08-03 RX ADMIN — ERYTHROPOIETIN 40000 UNITS: 40000 INJECTION, SOLUTION INTRAVENOUS; SUBCUTANEOUS at 12:45

## 2018-08-03 RX ADMIN — Medication 30 ML: at 16:42

## 2018-08-03 RX ADMIN — PALONOSETRON HYDROCHLORIDE 0.25 MG: 0.25 INJECTION INTRAVENOUS at 12:59

## 2018-08-03 RX ADMIN — CARBOPLATIN 540 MG: 10 INJECTION, SOLUTION INTRAVENOUS at 15:58

## 2018-08-03 RX ADMIN — Medication 500 UNITS: at 17:09

## 2018-08-03 RX ADMIN — Medication 40 ML: at 17:08

## 2018-08-03 RX ADMIN — Medication 20 ML: at 12:26

## 2018-08-03 RX ADMIN — TRASTUZUMAB 320 MG: 150 INJECTION, POWDER, LYOPHILIZED, FOR SOLUTION INTRAVENOUS at 13:37

## 2018-08-03 RX ADMIN — ALTEPLASE 2 MG: 2.2 INJECTION, POWDER, LYOPHILIZED, FOR SOLUTION INTRAVENOUS at 11:44

## 2018-08-03 RX ADMIN — DOCETAXEL ANHYDROUS 120 MG: 10 INJECTION, SOLUTION INTRAVENOUS at 14:51

## 2018-08-03 RX ADMIN — PEGFILGRASTIM 6 MG: KIT SUBCUTANEOUS at 17:13

## 2018-08-03 RX ADMIN — DEXAMETHASONE SODIUM PHOSPHATE 12 MG: 4 INJECTION, SOLUTION INTRA-ARTICULAR; INTRALESIONAL; INTRAMUSCULAR; INTRAVENOUS; SOFT TISSUE at 12:37

## 2018-08-03 RX ADMIN — ALTEPLASE 2 MG: 2.2 INJECTION, POWDER, LYOPHILIZED, FOR SOLUTION INTRAVENOUS at 11:45

## 2018-08-03 RX ADMIN — SODIUM CHLORIDE 150 MG: 900 INJECTION, SOLUTION INTRAVENOUS at 13:10

## 2018-08-03 RX ADMIN — Medication 500 UNITS: at 17:08

## 2018-08-03 NOTE — PROGRESS NOTES
[facility] Progress Note Date: August 3, 2018 Name: Nancy Cates MRN: [id number] : 1960 Chemotherapy Cycle:  C5 of 6 Herceptin/Perjecta/Docetaxol/Carboplatin Ms. Madeleine Reveles was assessed and education was provided. Patient c/o being tired. Ms. Ventura's vitals were reviewed. Visit Vitals  /72 (BP 1 Location: Right arm, BP Patient Position: Standing)  Pulse 82  Temp 98 °F (36.7 °C)  Resp 18  Ht 5' 5\" (1.651 m)  Wt 52.3 kg (115 lb 3.2 oz)  SpO2 100%  BMI 19.17 kg/m2 Medial site of mediport at left upper chest accessed with 20 gauge, 1 inch Branch needle w/o difficulty. Good blood return obtained, labs drawn after 8 ml discard. This was followed with 40 ml NS flush as no blood return obtained again after blood drawn. Lateral site of mediport accessed with 20 gauge, 1 inch Branch needle w/o difficulty. No blood return obtained even with 30 ml NS. flush. Lab results were obtained and reviewed. Recent Results (from the past 12 hour(s)) HEPATIC FUNCTION PANEL Collection Time: 18  9:45 AM  
Result Value Ref Range Protein, total 6.5 6.4 - 8.2 g/dL Albumin 3.0 (L) 3.4 - 5.0 g/dL Globulin 3.5 2.0 - 4.0 g/dL A-G Ratio 0.9 0.8 - 1.7 Bilirubin, total 0.4 0.2 - 1.0 MG/DL Bilirubin, direct <0.1 0.0 - 0.2 MG/DL Alk. phosphatase 61 45 - 117 U/L  
 AST (SGOT) 16 15 - 37 U/L  
 ALT (SGPT) 19 13 - 56 U/L  
CBC WITH AUTOMATED DIFF Collection Time: 18  9:45 AM  
Result Value Ref Range WBC 6.2 4.6 - 13.2 K/uL  
 RBC 2.98 (L) 4.20 - 5.30 M/uL HGB 9.4 (L) 12.0 - 16.0 g/dL HCT 28.1 (L) 35.0 - 45.0 % MCV 94.3 74.0 - 97.0 FL  
 MCH 31.5 24.0 - 34.0 PG  
 MCHC 33.5 31.0 - 37.0 g/dL  
 RDW 15.3 (H) 11.6 - 14.5 % PLATELET 136 416 - 843 K/uL MPV 8.3 (L) 9.2 - 11.8 FL  
 NEUTROPHILS 82 (H) 40 - 73 % LYMPHOCYTES 12 (L) 21 - 52 % MONOCYTES 6 3 - 10 % EOSINOPHILS 0 0 - 5 % BASOPHILS 0 0 - 2 % ABS. NEUTROPHILS 5.1 1.8 - 8.0 K/UL  
 ABS. LYMPHOCYTES 0.7 (L) 0.9 - 3.6 K/UL  
 ABS. MONOCYTES 0.4 0.05 - 1.2 K/UL  
 ABS. EOSINOPHILS 0.0 0.0 - 0.4 K/UL  
 ABS. BASOPHILS 0.0 0.0 - 0.1 K/UL  
 DF AUTOMATED    
POC CHEM8 Collection Time: 08/03/18  9:52 AM  
Result Value Ref Range CO2, POC 23 19 - 24 MMOL/L Glucose, POC 88 74 - 106 MG/DL  
 BUN, POC 14 7 - 18 MG/DL Creatinine, POC 0.5 (L) 0.6 - 1.3 MG/DL  
 GFRAA, POC >60 >60 ml/min/1.73m2 GFRNA, POC >60 >60 ml/min/1.73m2 Sodium,  136 - 145 MMOL/L Potassium, POC 4.2 3.5 - 5.5 MMOL/L Calcium, ionized (POC) 1.19 1.12 - 1.32 mmol/L Chloride,  100 - 108 MMOL/L Anion gap, POC 16 10 - 20 Hematocrit, POC 31 (L) 36 - 49 % Hemoglobin, POC 10.5 (L) 12 - 16 G/DL  
 
500 ml Normal Saline infusing at 125 ml/hr via medial port. After 1 hour still no blood return. NS infusion stopped. Each site of port had Cath Carlitos 2 mg/2.2ml Sterile water instilled. No blood return after 30 minutes. Julio Oropeza NP notified that blood return when obtained was brisk and of large volume. When line re-checked at 1220, medial site had brisk blood return, and 5 ml discarded. Normal Saline restarted via medial site. Procrit 60,000 units was administered subcutaneously in back of left upper arm for HGB of 9.4/Hct of 28.1 %. Parameters are to hold if Hgb greater than 10 or Hct greater than 30. No irritation noted at site, band aid applied. Pre-medications of Dexamethasone 12 mg/50 ml IVPB, Aloxi 0.25 mg/5 ml IVP, Emend 150 mg/150 ml IVPB  were administered as ordered and chemotherapy was initiated. Herceptin 320mg/290 ml was infused at 580 ml/hr. No s.s reaction noted. Perjeta 420 mg/289 ml was infused at 578 ml/hr. No s/s reaction noted. Docetaxol 120 mg/282 ml was infused at 282 ml/hr  No s/s reaction noted. Carboplatin 540 mg/329 ml was infused at 329 ml/hr  No s/s reaction noted.  
 
At 1600,obtained brisk blood return from lateral site of port. 8 ml discarded, the flushed with 30 ml NS and still able to obtain blood return. Each site flushed with 20 ml NS and 5 ml of 100 units/ml Heparin, then de-accessed. No irritation noted at sites, band aid applied. Neulasta 6 mg On Body Injector prepared and applied to back of right upper arm at 1713. Patient instructed that OBI will initiate infusion at 2013 on 8/4/2018. Patient verbalized understanding of OBI care and removal. 
 
 
Ms. Pam Davis tolerated infusion, and had no complaints at this time. Armband removed and shredded. Ms. Pam Davis was discharged from [place] [address] in stable condition at [phone]. She is to return on 8/24/2018 at 0900 for her next appointment labs,C6 of 6 chemotherapy and OBI. Timoteo Ramos RN August 3, 2018 
4:21 PM

## 2018-08-10 DIAGNOSIS — E87.6 HYPOKALEMIA: ICD-10-CM

## 2018-08-13 RX ORDER — POTASSIUM CHLORIDE 750 MG/1
CAPSULE, EXTENDED RELEASE ORAL
Qty: 30 CAP | Refills: 0 | Status: SHIPPED | OUTPATIENT
Start: 2018-08-13 | End: 2018-10-16

## 2018-08-17 ENCOUNTER — APPOINTMENT (OUTPATIENT)
Dept: INFUSION THERAPY | Age: 58
End: 2018-08-17
Payer: MEDICAID

## 2018-08-24 ENCOUNTER — HOSPITAL ENCOUNTER (OUTPATIENT)
Dept: INFUSION THERAPY | Age: 58
Discharge: HOME | End: 2018-08-24
Payer: MEDICAID

## 2018-08-24 VITALS
OXYGEN SATURATION: 100 % | HEIGHT: 65 IN | BODY MASS INDEX: 19.61 KG/M2 | HEART RATE: 88 BPM | TEMPERATURE: 98.2 F | RESPIRATION RATE: 18 BRPM | SYSTOLIC BLOOD PRESSURE: 120 MMHG | WEIGHT: 117.7 LBS | DIASTOLIC BLOOD PRESSURE: 78 MMHG

## 2018-08-24 LAB
ALBUMIN SERPL-MCNC: 3 G/DL (ref 3.4–5)
ALBUMIN/GLOB SERPL: 0.9 {RATIO} (ref 0.8–1.7)
ALP SERPL-CCNC: 71 U/L (ref 45–117)
ALT SERPL-CCNC: 15 U/L (ref 13–56)
ANION GAP BLD CALC-SCNC: 13 MMOL/L (ref 10–20)
AST SERPL-CCNC: 13 U/L (ref 15–37)
BASO+EOS+MONOS # BLD AUTO: 0.1 K/UL (ref 0–2.3)
BASO+EOS+MONOS # BLD AUTO: 3 % (ref 0.1–17)
BILIRUB DIRECT SERPL-MCNC: <0.1 MG/DL (ref 0–0.2)
BILIRUB SERPL-MCNC: 0.2 MG/DL (ref 0.2–1)
BUN BLD-MCNC: 8 MG/DL (ref 7–18)
CA-I BLD-MCNC: 1.14 MMOL/L (ref 1.12–1.32)
CHLORIDE BLD-SCNC: 105 MMOL/L (ref 100–108)
CO2 BLD-SCNC: 25 MMOL/L (ref 19–24)
CREAT UR-MCNC: 0.6 MG/DL (ref 0.6–1.3)
DIFFERENTIAL METHOD BLD: ABNORMAL
ERYTHROCYTE [DISTWIDTH] IN BLOOD BY AUTOMATED COUNT: 15.8 % (ref 11.5–14.5)
GLOBULIN SER CALC-MCNC: 3.2 G/DL (ref 2–4)
GLUCOSE BLD STRIP.AUTO-MCNC: 85 MG/DL (ref 74–106)
HCT VFR BLD AUTO: 29 % (ref 36–48)
HCT VFR BLD CALC: 27 % (ref 36–49)
HGB BLD-MCNC: 9.2 G/DL (ref 12–16)
HGB BLD-MCNC: 9.4 G/DL (ref 12–16)
LYMPHOCYTES # BLD: 1 K/UL (ref 1.1–5.9)
LYMPHOCYTES NFR BLD: 36 % (ref 14–44)
MCH RBC QN AUTO: 32 PG (ref 25–35)
MCHC RBC AUTO-ENTMCNC: 32.4 G/DL (ref 31–37)
MCV RBC AUTO: 98.6 FL (ref 78–102)
NEUTS SEG # BLD: 1.7 K/UL (ref 1.8–9.5)
NEUTS SEG NFR BLD: 62 % (ref 40–70)
PLATELET # BLD AUTO: 44 K/UL (ref 140–440)
POTASSIUM BLD-SCNC: 3.7 MMOL/L (ref 3.5–5.5)
PROT SERPL-MCNC: 6.2 G/DL (ref 6.4–8.2)
RBC # BLD AUTO: 2.94 M/UL (ref 4.1–5.1)
SODIUM BLD-SCNC: 139 MMOL/L (ref 136–145)
WBC # BLD AUTO: 2.8 K/UL (ref 4.5–13)

## 2018-08-24 PROCEDURE — 74011000250 HC RX REV CODE- 250: Performed by: INTERNAL MEDICINE

## 2018-08-24 PROCEDURE — 36593 DECLOT VASCULAR DEVICE: CPT

## 2018-08-24 PROCEDURE — 80047 BASIC METABLC PNL IONIZED CA: CPT

## 2018-08-24 PROCEDURE — 74011250636 HC RX REV CODE- 250/636: Performed by: INTERNAL MEDICINE

## 2018-08-24 PROCEDURE — 96372 THER/PROPH/DIAG INJ SC/IM: CPT

## 2018-08-24 PROCEDURE — 96523 IRRIG DRUG DELIVERY DEVICE: CPT

## 2018-08-24 PROCEDURE — 85025 COMPLETE CBC W/AUTO DIFF WBC: CPT | Performed by: INTERNAL MEDICINE

## 2018-08-24 PROCEDURE — 77030012965 HC NDL HUBR BBMI -A

## 2018-08-24 PROCEDURE — 80076 HEPATIC FUNCTION PANEL: CPT | Performed by: INTERNAL MEDICINE

## 2018-08-24 RX ORDER — PALONOSETRON 0.05 MG/ML
0.25 INJECTION, SOLUTION INTRAVENOUS ONCE
Status: ACTIVE | OUTPATIENT
Start: 2018-08-24 | End: 2018-08-24

## 2018-08-24 RX ORDER — HEPARIN 100 UNIT/ML
SYRINGE INTRAVENOUS
Status: DISPENSED
Start: 2018-08-24 | End: 2018-08-24

## 2018-08-24 RX ORDER — SODIUM CHLORIDE 0.9 % (FLUSH) 0.9 %
10-40 SYRINGE (ML) INJECTION AS NEEDED
Status: DISCONTINUED | OUTPATIENT
Start: 2018-08-24 | End: 2018-08-28 | Stop reason: HOSPADM

## 2018-08-24 RX ORDER — SODIUM CHLORIDE 9 MG/ML
250 INJECTION, SOLUTION INTRAVENOUS CONTINUOUS
Status: DISPENSED | OUTPATIENT
Start: 2018-08-24 | End: 2018-08-25

## 2018-08-24 RX ORDER — HEPARIN 100 UNIT/ML
500 SYRINGE INTRAVENOUS AS NEEDED
Status: DISCONTINUED | OUTPATIENT
Start: 2018-08-24 | End: 2018-08-28 | Stop reason: HOSPADM

## 2018-08-24 RX ADMIN — ERYTHROPOIETIN 20000 UNITS: 20000 INJECTION, SOLUTION INTRAVENOUS; SUBCUTANEOUS at 10:04

## 2018-08-24 RX ADMIN — ERYTHROPOIETIN 40000 UNITS: 40000 INJECTION, SOLUTION INTRAVENOUS; SUBCUTANEOUS at 10:04

## 2018-08-24 RX ADMIN — Medication 30 ML: at 09:18

## 2018-08-24 RX ADMIN — SODIUM CHLORIDE, PRESERVATIVE FREE 500 UNITS: 5 INJECTION INTRAVENOUS at 10:55

## 2018-08-24 RX ADMIN — Medication 20 ML: at 10:53

## 2018-08-24 RX ADMIN — ALTEPLASE 2 MG: 2.2 INJECTION, POWDER, LYOPHILIZED, FOR SOLUTION INTRAVENOUS at 09:45

## 2018-08-24 RX ADMIN — ALTEPLASE 2 MG: 2.2 INJECTION, POWDER, LYOPHILIZED, FOR SOLUTION INTRAVENOUS at 09:49

## 2018-08-24 RX ADMIN — Medication 30 ML: at 09:22

## 2018-08-24 RX ADMIN — SODIUM CHLORIDE, PRESERVATIVE FREE 500 UNITS: 5 INJECTION INTRAVENOUS at 10:54

## 2018-08-24 RX ADMIN — Medication 20 ML: at 10:51

## 2018-08-24 NOTE — PROGRESS NOTES
[facility] Progress Note    Date: 2018    Name: Natalya Judge    MRN: [id number]         : 1960      Ms. Patience Rocha arrived to [facility] at 2695 ambulatory. Pt is here today for Herceptin/ Perjeta/Docetaxol/Carboplatin, Cycle 6, Day 1. Ms. Patience Rocha was assessed and education was provided. No complaints or concerns voiced    Ms. Ventura's vitals were reviewed. Visit Vitals    /78 (BP 1 Location: Right arm, BP Patient Position: Sitting)    Pulse 88    Temp 98.2 °F (36.8 °C)    Resp 18    Ht 5' 5\" (1.651 m)    Wt 53.4 kg (117 lb 11.2 oz)    SpO2 100%    BMI 19.59 kg/m2       Patient's double ports to upper left chest accessed with 1' 20 gauge hubers ( scant blood returns from medial port, and brisk flush noted, but not enough blood to get blood draw. Scant blood returns noted from lateral port, but not enough for blood draw--good flush noted. Will declot both ports)    Blood sample obtained via barb-puncture stick x 1 to right AC for cbc, bmp and hepatic function        Lab results were obtained and reviewed. Recent Results (from the past 12 hour(s))   CBC WITH 3 PART DIFF    Collection Time: 18  9:25 AM   Result Value Ref Range    WBC 2.8 (L) 4.5 - 13.0 K/uL    RBC 2.94 (L) 4.10 - 5.10 M/uL    HGB 9.4 (L) 12.0 - 16 g/dL    HCT 29.0 (L) 36 - 48 %    MCV 98.6 78 - 102 FL    MCH 32.0 25.0 - 35.0 PG    MCHC 32.4 31 - 37 g/dL    RDW 15.8 (H) 11.5 - 14.5 %    PLATELET 44 (L) 528 - 440 K/uL    NEUTROPHILS 62 40 - 70 %    MIXED CELLS 3 0.1 - 17 %    LYMPHOCYTES 36 14 - 44 %    ABS. NEUTROPHILS 1.7 (L) 1.8 - 9.5 K/UL    ABS. MIXED CELLS 0.1 0.0 - 2.3 K/uL    ABS.  LYMPHOCYTES 1.0 (L) 1.1 - 5.9 K/UL    DF AUTOMATED     HEPATIC FUNCTION PANEL    Collection Time: 18  9:25 AM   Result Value Ref Range    Protein, total 6.2 (L) 6.4 - 8.2 g/dL    Albumin 3.0 (L) 3.4 - 5.0 g/dL    Globulin 3.2 2.0 - 4.0 g/dL    A-G Ratio 0.9 0.8 - 1.7      Bilirubin, total 0.2 0.2 - 1.0 MG/DL    Bilirubin, direct <0.1 0.0 - 0.2 MG/DL    Alk. phosphatase 71 45 - 117 U/L    AST (SGOT) 13 (L) 15 - 37 U/L    ALT (SGPT) 15 13 - 56 U/L   POC CHEM8    Collection Time: 08/24/18  9:30 AM   Result Value Ref Range    CO2, POC 25 (H) 19 - 24 MMOL/L    Glucose, POC 85 74 - 106 MG/DL    BUN, POC 8 7 - 18 MG/DL    Creatinine, POC 0.6 0.6 - 1.3 MG/DL    GFRAA, POC >60 >60 ml/min/1.73m2    GFRNA, POC >60 >60 ml/min/1.73m2    Sodium,  136 - 145 MMOL/L    Potassium, POC 3.7 3.5 - 5.5 MMOL/L    Calcium, ionized (POC) 1.14 1.12 - 1.32 mmol/L    Chloride,  100 - 108 MMOL/L    Anion gap, POC 13 10 - 20      Hematocrit, POC 27 (L) 36 - 49 %    Hemoglobin, POC 9.2 (L) 12 - 16 G/DL       Jackie Otoo NP notified of above platelet count. Hold chemo today per Jackie, and re-challenge cycle 6 in one week. Patient made aware       Procrit injection 60,000 units given SQ to upper back of left arm. Tolerated well. Bandaid to site      Cath karla 2mg/2ml instilled in each port to dwell 45-60 minutes. After 60 minutes, both ports with brisk blood returns. 5 ml blood discarded from each port and each port flushed with 20 ml normal saline and 500 units heparin flush. Hubers removed. Sites without redness, swelling, infiltration or tenderness. Gauze/tape applied to sites. Reviewed discharge instructions with patient. She verbalized understanding    Ms. Janak Gillis was discharged from [place] [address] in stable condition at 1100. She is to return on 8/31/18 at 0800 for her next appointment.     Torrie Newton RN  August 24, 2018

## 2018-08-27 RX ORDER — PALONOSETRON 0.05 MG/ML
0.25 INJECTION, SOLUTION INTRAVENOUS ONCE
Status: CANCELLED | OUTPATIENT
Start: 2018-08-31 | End: 2018-08-31

## 2018-08-28 ENCOUNTER — HOSPITAL ENCOUNTER (OUTPATIENT)
Dept: INFUSION THERAPY | Age: 58
End: 2018-08-28
Payer: MEDICAID

## 2018-08-31 ENCOUNTER — HOSPITAL ENCOUNTER (OUTPATIENT)
Dept: INFUSION THERAPY | Age: 58
Discharge: HOME | End: 2018-08-31
Payer: MEDICAID

## 2018-08-31 RX ORDER — SODIUM CHLORIDE 9 MG/ML
500 INJECTION, SOLUTION INTRAVENOUS ONCE
Status: CANCELLED | OUTPATIENT
Start: 2018-08-31 | End: 2018-08-31

## 2018-08-31 RX ORDER — HEPARIN 100 UNIT/ML
500 SYRINGE INTRAVENOUS AS NEEDED
Status: CANCELLED | OUTPATIENT
Start: 2018-08-31

## 2018-08-31 RX ORDER — SODIUM CHLORIDE 0.9 % (FLUSH) 0.9 %
10-40 SYRINGE (ML) INJECTION AS NEEDED
Status: CANCELLED | OUTPATIENT
Start: 2018-08-31

## 2018-08-31 NOTE — PROGRESS NOTES
Patient did not show for her C6 chemotherapy appointment today. Number on file was called and I spoke with Ms. Pedro Magaña. She stated that she did not know that she had an appointment today. Patient was transferred to the [facility] and her appointment was rescheduled.

## 2018-09-10 ENCOUNTER — HOSPITAL ENCOUNTER (OUTPATIENT)
Dept: INFUSION THERAPY | Age: 58
Discharge: HOME | End: 2018-09-10
Payer: MEDICAID

## 2018-09-10 VITALS
RESPIRATION RATE: 18 BRPM | HEART RATE: 76 BPM | HEIGHT: 65 IN | TEMPERATURE: 97.9 F | DIASTOLIC BLOOD PRESSURE: 84 MMHG | WEIGHT: 119.4 LBS | SYSTOLIC BLOOD PRESSURE: 147 MMHG | OXYGEN SATURATION: 100 % | BODY MASS INDEX: 19.89 KG/M2

## 2018-09-10 LAB
ALBUMIN SERPL-MCNC: 3 G/DL (ref 3.4–5)
ALBUMIN/GLOB SERPL: 0.8 {RATIO} (ref 0.8–1.7)
ALP SERPL-CCNC: 77 U/L (ref 45–117)
ALT SERPL-CCNC: 17 U/L (ref 13–56)
ANION GAP BLD CALC-SCNC: 14 MMOL/L (ref 10–20)
AST SERPL-CCNC: 17 U/L (ref 15–37)
BASO+EOS+MONOS # BLD AUTO: 0.1 K/UL (ref 0–2.3)
BASO+EOS+MONOS # BLD AUTO: 3 % (ref 0.1–17)
BILIRUB DIRECT SERPL-MCNC: <0.1 MG/DL (ref 0–0.2)
BILIRUB SERPL-MCNC: 0.4 MG/DL (ref 0.2–1)
BUN BLD-MCNC: 12 MG/DL (ref 7–18)
CA-I BLD-MCNC: 1.2 MMOL/L (ref 1.12–1.32)
CHLORIDE BLD-SCNC: 106 MMOL/L (ref 100–108)
CO2 BLD-SCNC: 25 MMOL/L (ref 19–24)
CREAT UR-MCNC: 0.7 MG/DL (ref 0.6–1.3)
DIFFERENTIAL METHOD BLD: ABNORMAL
ERYTHROCYTE [DISTWIDTH] IN BLOOD BY AUTOMATED COUNT: 16.1 % (ref 11.5–14.5)
GLOBULIN SER CALC-MCNC: 4 G/DL (ref 2–4)
GLUCOSE BLD STRIP.AUTO-MCNC: 86 MG/DL (ref 74–106)
HCT VFR BLD AUTO: 33.7 % (ref 36–48)
HCT VFR BLD CALC: 35 % (ref 36–49)
HGB BLD-MCNC: 11.1 G/DL (ref 12–16)
HGB BLD-MCNC: 11.9 G/DL (ref 12–16)
LYMPHOCYTES # BLD: 0.5 K/UL (ref 1.1–5.9)
LYMPHOCYTES NFR BLD: 10 % (ref 14–44)
MCH RBC QN AUTO: 32.6 PG (ref 25–35)
MCHC RBC AUTO-ENTMCNC: 32.9 G/DL (ref 31–37)
MCV RBC AUTO: 99.1 FL (ref 78–102)
NEUTS SEG # BLD: 4.2 K/UL (ref 1.8–9.5)
NEUTS SEG NFR BLD: 87 % (ref 40–70)
PLATELET # BLD AUTO: 247 K/UL (ref 140–440)
POTASSIUM BLD-SCNC: 4.1 MMOL/L (ref 3.5–5.5)
PROT SERPL-MCNC: 7 G/DL (ref 6.4–8.2)
RBC # BLD AUTO: 3.4 M/UL (ref 4.1–5.1)
SODIUM BLD-SCNC: 140 MMOL/L (ref 136–145)
WBC # BLD AUTO: 4.8 K/UL (ref 4.5–13)

## 2018-09-10 PROCEDURE — 96377 APPLICATON ON-BODY INJECTOR: CPT

## 2018-09-10 PROCEDURE — 74011250636 HC RX REV CODE- 250/636: Performed by: INTERNAL MEDICINE

## 2018-09-10 PROCEDURE — 96415 CHEMO IV INFUSION ADDL HR: CPT

## 2018-09-10 PROCEDURE — 74011000258 HC RX REV CODE- 258: Performed by: INTERNAL MEDICINE

## 2018-09-10 PROCEDURE — 96375 TX/PRO/DX INJ NEW DRUG ADDON: CPT

## 2018-09-10 PROCEDURE — 80047 BASIC METABLC PNL IONIZED CA: CPT

## 2018-09-10 PROCEDURE — 74011250636 HC RX REV CODE- 250/636

## 2018-09-10 PROCEDURE — 74011000250 HC RX REV CODE- 250: Performed by: INTERNAL MEDICINE

## 2018-09-10 PROCEDURE — 85025 COMPLETE CBC W/AUTO DIFF WBC: CPT | Performed by: INTERNAL MEDICINE

## 2018-09-10 PROCEDURE — 36415 COLL VENOUS BLD VENIPUNCTURE: CPT

## 2018-09-10 PROCEDURE — 96367 TX/PROPH/DG ADDL SEQ IV INF: CPT

## 2018-09-10 PROCEDURE — 77030012965 HC NDL HUBR BBMI -A

## 2018-09-10 PROCEDURE — 96413 CHEMO IV INFUSION 1 HR: CPT

## 2018-09-10 PROCEDURE — 80076 HEPATIC FUNCTION PANEL: CPT | Performed by: INTERNAL MEDICINE

## 2018-09-10 PROCEDURE — 36593 DECLOT VASCULAR DEVICE: CPT

## 2018-09-10 PROCEDURE — 96417 CHEMO IV INFUS EACH ADDL SEQ: CPT

## 2018-09-10 RX ORDER — SODIUM CHLORIDE 9 MG/ML
500 INJECTION, SOLUTION INTRAVENOUS ONCE
Status: COMPLETED | OUTPATIENT
Start: 2018-09-10 | End: 2018-09-10

## 2018-09-10 RX ORDER — SODIUM CHLORIDE 0.9 % (FLUSH) 0.9 %
10-40 SYRINGE (ML) INJECTION AS NEEDED
Status: DISCONTINUED | OUTPATIENT
Start: 2018-09-10 | End: 2018-09-14 | Stop reason: HOSPADM

## 2018-09-10 RX ORDER — PALONOSETRON 0.05 MG/ML
0.25 INJECTION, SOLUTION INTRAVENOUS ONCE
Status: COMPLETED | OUTPATIENT
Start: 2018-09-10 | End: 2018-09-10

## 2018-09-10 RX ORDER — HEPARIN 100 UNIT/ML
500 SYRINGE INTRAVENOUS AS NEEDED
Status: DISCONTINUED | OUTPATIENT
Start: 2018-09-10 | End: 2018-09-14 | Stop reason: HOSPADM

## 2018-09-10 RX ADMIN — SODIUM CHLORIDE, PRESERVATIVE FREE 500 UNITS: 5 INJECTION INTRAVENOUS at 18:01

## 2018-09-10 RX ADMIN — ALTEPLASE 2 MG: 2.2 INJECTION, POWDER, LYOPHILIZED, FOR SOLUTION INTRAVENOUS at 11:00

## 2018-09-10 RX ADMIN — SODIUM CHLORIDE, PRESERVATIVE FREE 500 UNITS: 5 INJECTION INTRAVENOUS at 18:00

## 2018-09-10 RX ADMIN — SODIUM CHLORIDE 150 MG: 900 INJECTION, SOLUTION INTRAVENOUS at 12:19

## 2018-09-10 RX ADMIN — Medication 10 ML: at 12:13

## 2018-09-10 RX ADMIN — SODIUM CHLORIDE 120 MG: 900 INJECTION, SOLUTION INTRAVENOUS at 15:55

## 2018-09-10 RX ADMIN — Medication 20 ML: at 18:00

## 2018-09-10 RX ADMIN — SODIUM CHLORIDE 500 ML: 900 INJECTION, SOLUTION INTRAVENOUS at 12:13

## 2018-09-10 RX ADMIN — ALTEPLASE 2 MG: 2.2 INJECTION, POWDER, LYOPHILIZED, FOR SOLUTION INTRAVENOUS at 11:01

## 2018-09-10 RX ADMIN — PALONOSETRON HYDROCHLORIDE 0.25 MG: 0.25 INJECTION INTRAVENOUS at 12:18

## 2018-09-10 RX ADMIN — PEGFILGRASTIM 6 MG: KIT SUBCUTANEOUS at 18:06

## 2018-09-10 RX ADMIN — Medication 10 ML: at 18:01

## 2018-09-10 RX ADMIN — TRASTUZUMAB 420 MG: 150 INJECTION, POWDER, LYOPHILIZED, FOR SOLUTION INTRAVENOUS at 13:14

## 2018-09-10 RX ADMIN — PERTUZUMAB 420 MG: 30 INJECTION, SOLUTION, CONCENTRATE INTRAVENOUS at 14:52

## 2018-09-10 RX ADMIN — CARBOPLATIN 540 MG: 10 INJECTION, SOLUTION INTRAVENOUS at 16:56

## 2018-09-10 NOTE — PROGRESS NOTES
[facility] Progress Note Date: September 10, 2018 Name: Julian Seo MRN: [id number] : 1960 Chemotherapy Cycle: Perjeta/Herceptin/Taxol/Carbo C6D1 Pt to \[facility]\"", ambulatory, at 1020. Ms. Arslan Ross was assessed and education was provided. Ms. Ventura's vitals were reviewed. Visit Vitals  /84 (BP 1 Location: Right arm, BP Patient Position: Sitting)  Pulse 76  Temp 97.9 °F (36.6 °C)  Resp 18  Ht 5' 5\" (1.651 m)  Wt 54.2 kg (119 lb 6.4 oz)  SpO2 100%  Breastfeeding No  
 BMI 19.87 kg/m2 Left chest double lumen mediport accessed with 20 g 1 inch johnson needle. Both ports flushed easily however, did not have blood return. Cathflo 2 mg instilled in both lumen. After approximately 1 hour, both lumen had scant blood return. 2 ml of blood was removed from the lateral lumen and wasted. NS initiated @ [facility]. After approximately an hour and 20 minuets the medial lumen had brisk blood return and 10 ml was removed and wasted. Lumen then flushed with 10 ml NS. Patient states that she is not pregnant and refused a pregnancy test today. Blood drawn via the left forearm for CBC, BMP and LFT per patient request. Gauze and coban applied to the site. Lab results were obtained and reviewed. Recent Results (from the past 12 hour(s)) CBC WITH 3 PART DIFF Collection Time: 09/10/18 11:05 AM  
Result Value Ref Range WBC 4.8 4.5 - 13.0 K/uL  
 RBC 3.40 (L) 4.10 - 5.10 M/uL  
 HGB 11.1 (L) 12.0 - 16 g/dL HCT 33.7 (L) 36 - 48 % MCV 99.1 78 - 102 FL  
 MCH 32.6 25.0 - 35.0 PG  
 MCHC 32.9 31 - 37 g/dL  
 RDW 16.1 (H) 11.5 - 14.5 % PLATELET 840 633 - 888 K/uL NEUTROPHILS 87 (H) 40 - 70 % MIXED CELLS 3 0.1 - 17 % LYMPHOCYTES 10 (L) 14 - 44 % ABS. NEUTROPHILS 4.2 1.8 - 9.5 K/UL  
 ABS. MIXED CELLS 0.1 0.0 - 2.3 K/uL  
 ABS. LYMPHOCYTES 0.5 (L) 1.1 - 5.9 K/UL  
 DF AUTOMATED HEPATIC FUNCTION PANEL  
 Collection Time: 09/10/18 11:05 AM  
Result Value Ref Range Protein, total 7.0 6.4 - 8.2 g/dL Albumin 3.0 (L) 3.4 - 5.0 g/dL Globulin 4.0 2.0 - 4.0 g/dL A-G Ratio 0.8 0.8 - 1.7 Bilirubin, total 0.4 0.2 - 1.0 MG/DL Bilirubin, direct <0.1 0.0 - 0.2 MG/DL Alk. phosphatase 77 45 - 117 U/L  
 AST (SGOT) 17 15 - 37 U/L  
 ALT (SGPT) 17 13 - 56 U/L  
POC CHEM8 Collection Time: 09/10/18 11:13 AM  
Result Value Ref Range CO2, POC 25 (H) 19 - 24 MMOL/L Glucose, POC 86 74 - 106 MG/DL  
 BUN, POC 12 7 - 18 MG/DL Creatinine, POC 0.7 0.6 - 1.3 MG/DL  
 GFRAA, POC >60 >60 ml/min/1.73m2 GFRNA, POC >60 >60 ml/min/1.73m2 Sodium,  136 - 145 MMOL/L Potassium, POC 4.1 3.5 - 5.5 MMOL/L Calcium, ionized (POC) 1.20 1. 12 - 1.32 mmol/L Chloride,  100 - 108 MMOL/L Anion gap, POC 14 10 - 20 Hematocrit, POC 35 (L) 36 - 49 % Hemoglobin, POC 11.9 (L) 12 - 16 G/DL Lab results within ordered parameters to give chemo today. PLT = 247, ANC = 4.2. Chemo dosages verified with today's BSA and creatinine and found to be within 10% of ordered dosages. Patient states that she took her Decadron as ordered this morning prior to coming into the [facility]. She was also given a reminder to take her Decadron BID tomorrow as well. Patient stated understanding. Pre-medications (Emend 150 mg and Aloxi 0.25 mg) were administered as ordered and chemotherapy was initiated after blood return from port re-verified. Reviewed expected side effects of premeds with patient. Decadron 12 mg was held per written order since the patient took the PO Decadron prior to coming into the [facility] today. Trastuzumab 420 mg was initiated to run over 90 minuets per written order. 15 minutes into infusion, VS stable and pt denied itching/hives, lip/tongue/facial swelling, SOB, back pain, or other complaints. Pt tolerated entire infusion well and VS remained stable. Line flushed with NS and blood return from port re-verified. Pertuzumab 420 mg was initiated to run over 30 minuets per written order. 15 minutes into infusion, VS stable and pt denied itching/hives, lip/tongue/facial swelling, SOB, back pain, or other complaints. Pt tolerated entire infusion well and VS remained stable. Line flushed with NS and blood return from port re-verified. Patient was observed for 30 minuets post infusion per written order. No reactions or distress noted. Docetaxel 120 mg was initiated to run over 60 minuets per written order. 15 minutes into infusion, VS stable and pt denied itching/hives, lip/tongue/facial swelling, SOB, back pain, or other complaints. Pt tolerated entire infusion well and VS remained stable. Line flushed with NS and blood return from port re-verified. Carboplatin 540 mg was initiated to run over 60 minuets per written order. VS stable at end of infusion and pt denied complaints. Line flushed with NS and blood return from port re-verified. Ms. Tomas Dumont tolerated infusion, and had no complaints at this time. Patient politely refused her post infusion observation period at this time. Patient Vitals for the past 12 hrs: 
 Temp Pulse Resp BP SpO2  
09/10/18 1800 97.9 °F (36.6 °C) 76 18 147/84 100 % 09/10/18 1551 97.6 °F (36.4 °C) 81 18 99/61 -  
09/10/18 1023 98.5 °F (36.9 °C) 77 18 119/81 100 % Both lumen of the mediport flushed with NS 20 ml and Heparin 500 units then de-accessed. No irritation or bleeding noted. Bandaid applied. Neulasta 6 mg OBI was placed on her right arm. The green light was noted to be on and blinking. Patient was told that she could remove the OBI tomorrow, 09/11/2018 at approximately 1015 pm. Patient was given instructions on the medication and she stated understanding. Patient armband removed and shredded.  
 
Ms. Tomas Dumont was discharged from [place] [address] in stable condition at 1815. She is to return on 09/14/2018 at 0900 for her next appointment. Shaka Brennan RN September 10, 2018

## 2018-09-14 ENCOUNTER — HOSPITAL ENCOUNTER (OUTPATIENT)
Dept: INFUSION THERAPY | Age: 58
End: 2018-09-14
Payer: MEDICAID

## 2018-09-17 ENCOUNTER — HOSPITAL ENCOUNTER (OUTPATIENT)
Dept: INFUSION THERAPY | Age: 58
Discharge: HOME | End: 2018-09-17
Payer: MEDICAID

## 2018-09-17 VITALS
RESPIRATION RATE: 18 BRPM | SYSTOLIC BLOOD PRESSURE: 117 MMHG | HEART RATE: 94 BPM | TEMPERATURE: 98.6 F | OXYGEN SATURATION: 100 % | DIASTOLIC BLOOD PRESSURE: 80 MMHG

## 2018-09-17 LAB
BASOPHILS # BLD: 0 K/UL (ref 0–0.06)
BASOPHILS NFR BLD: 0 % (ref 0–3)
DIFFERENTIAL METHOD BLD: ABNORMAL
EOSINOPHIL # BLD: 0 K/UL (ref 0–0.4)
EOSINOPHIL NFR BLD: 0 % (ref 0–5)
ERYTHROCYTE [DISTWIDTH] IN BLOOD BY AUTOMATED COUNT: 15 % (ref 11.6–14.5)
HCT VFR BLD AUTO: 32.6 % (ref 35–45)
HGB BLD-MCNC: 10.9 G/DL (ref 12–16)
LYMPHOCYTES # BLD: 0.7 K/UL (ref 0.8–3.5)
LYMPHOCYTES NFR BLD: 63 % (ref 20–51)
MCH RBC QN AUTO: 31.1 PG (ref 24–34)
MCHC RBC AUTO-ENTMCNC: 33.4 G/DL (ref 31–37)
MCV RBC AUTO: 93.1 FL (ref 74–97)
MONOCYTES # BLD: 0.1 K/UL (ref 0–1)
MONOCYTES NFR BLD: 6 % (ref 2–9)
NEUTS BAND NFR BLD MANUAL: 12 % (ref 0–5)
NEUTS SEG # BLD: 0.3 K/UL (ref 1.8–8)
NEUTS SEG NFR BLD: 19 % (ref 42–75)
PLATELET # BLD AUTO: 120 K/UL (ref 135–420)
PLATELET COMMENTS,PCOM: ABNORMAL
PMV BLD AUTO: 10.1 FL (ref 9.2–11.8)
RBC # BLD AUTO: 3.5 M/UL (ref 4.2–5.3)
RBC MORPH BLD: ABNORMAL
WBC # BLD AUTO: 1.1 K/UL (ref 4.6–13.2)

## 2018-09-17 PROCEDURE — 96372 THER/PROPH/DIAG INJ SC/IM: CPT

## 2018-09-17 PROCEDURE — 85025 COMPLETE CBC W/AUTO DIFF WBC: CPT | Performed by: INTERNAL MEDICINE

## 2018-09-17 PROCEDURE — 74011250636 HC RX REV CODE- 250/636: Performed by: NURSE PRACTITIONER

## 2018-09-17 PROCEDURE — 36415 COLL VENOUS BLD VENIPUNCTURE: CPT

## 2018-09-17 RX ADMIN — PEGFILGRASTIM 6 MG: 6 INJECTION SUBCUTANEOUS at 09:15

## 2018-09-17 NOTE — PROGRESS NOTES
[facility] Progress Note Date: 2018 Name: Bharathi Gilmore MRN: [id number] : 1960 Neulasta  AND cbc/procrit Q 3 weeks Ms. Anusha Valente arrived to [facility] at 7485 ambulatory. Denies pain, but states she is tired and fatigued. Ms. Anusha Valente was assessed and education was provided. Ms. Ventura's vitals were reviewed. Visit Vitals  /80 (BP 1 Location: Left arm, BP Patient Position: Sitting)  Pulse 94  Temp 98.6 °F (37 °C)  Resp 18  SpO2 100% Blood drawn for CBC via RIGHT AC venipuncture [id number]. Lab results were obtained and reviewed. Lab results scanned into media tab. No results found for this or any previous visit (from the past 12 hour(s)). Procrit injection 60,000 units not indicated today for H&H 11.3/33.9. Neulasta 6mg sq given in the right arm. Bandaid applied and patient tolerated well. WBC today 1.2. Patient Vitals for the past 4 hrs: 
 Temp Pulse Resp BP SpO2  
18 0908 98.6 °F (37 °C) 94 18 117/80 100 % Ms. Anusha Valente was discharged from [place] [address] in stable condition at [id number]. She is to return on 10/7//18 at 1500 for her next Procrit/Port Flush appointment. Zoey Matias RN 2018

## 2018-09-19 ENCOUNTER — OFFICE VISIT (OUTPATIENT)
Dept: ONCOLOGY | Age: 58
End: 2018-09-19

## 2018-09-19 ENCOUNTER — HOSPITAL ENCOUNTER (OUTPATIENT)
Dept: ONCOLOGY | Age: 58
Discharge: HOME | End: 2018-09-19

## 2018-09-19 ENCOUNTER — HOSPITAL ENCOUNTER (OUTPATIENT)
Dept: LAB | Age: 58
Discharge: HOME | End: 2018-09-19
Payer: MEDICAID

## 2018-09-19 VITALS
RESPIRATION RATE: 16 BRPM | HEART RATE: 91 BPM | SYSTOLIC BLOOD PRESSURE: 103 MMHG | HEIGHT: 65 IN | TEMPERATURE: 98.4 F | DIASTOLIC BLOOD PRESSURE: 69 MMHG | WEIGHT: 112 LBS | BODY MASS INDEX: 18.66 KG/M2

## 2018-09-19 DIAGNOSIS — T45.1X5A ANTINEOPLASTIC CHEMOTHERAPY INDUCED ANEMIA: ICD-10-CM

## 2018-09-19 DIAGNOSIS — R53.83 FATIGUE DUE TO TREATMENT: ICD-10-CM

## 2018-09-19 DIAGNOSIS — C50.411 MALIGNANT NEOPLASM OF UPPER-OUTER QUADRANT OF RIGHT BREAST IN FEMALE, ESTROGEN RECEPTOR NEGATIVE (HCC): ICD-10-CM

## 2018-09-19 DIAGNOSIS — D64.81 ANTINEOPLASTIC CHEMOTHERAPY INDUCED ANEMIA: ICD-10-CM

## 2018-09-19 DIAGNOSIS — C50.411 MALIGNANT NEOPLASM OF UPPER-OUTER QUADRANT OF RIGHT BREAST IN FEMALE, ESTROGEN RECEPTOR NEGATIVE (HCC): Primary | ICD-10-CM

## 2018-09-19 DIAGNOSIS — K21.9 GASTROESOPHAGEAL REFLUX DISEASE WITHOUT ESOPHAGITIS: ICD-10-CM

## 2018-09-19 DIAGNOSIS — Z17.1 MALIGNANT NEOPLASM OF UPPER-OUTER QUADRANT OF RIGHT BREAST IN FEMALE, ESTROGEN RECEPTOR NEGATIVE (HCC): ICD-10-CM

## 2018-09-19 DIAGNOSIS — Z17.1 MALIGNANT NEOPLASM OF UPPER-OUTER QUADRANT OF RIGHT BREAST IN FEMALE, ESTROGEN RECEPTOR NEGATIVE (HCC): Primary | ICD-10-CM

## 2018-09-19 LAB
ALBUMIN SERPL-MCNC: 3.4 G/DL (ref 3.4–5)
ALBUMIN/GLOB SERPL: 1 {RATIO} (ref 0.8–1.7)
ALP SERPL-CCNC: 73 U/L (ref 45–117)
ALT SERPL-CCNC: 22 U/L (ref 13–56)
ANION GAP SERPL CALC-SCNC: 6 MMOL/L (ref 3–18)
AST SERPL-CCNC: 15 U/L (ref 15–37)
BASOPHILS # BLD: 0 K/UL (ref 0–0.06)
BASOPHILS NFR BLD: 0 % (ref 0–3)
BILIRUB SERPL-MCNC: 0.3 MG/DL (ref 0.2–1)
BUN SERPL-MCNC: 11 MG/DL (ref 7–18)
BUN/CREAT SERPL: 15 (ref 12–20)
CALCIUM SERPL-MCNC: 8.6 MG/DL (ref 8.5–10.1)
CHLORIDE SERPL-SCNC: 104 MMOL/L (ref 100–108)
CO2 SERPL-SCNC: 27 MMOL/L (ref 21–32)
CREAT SERPL-MCNC: 0.71 MG/DL (ref 0.6–1.3)
DIFFERENTIAL METHOD BLD: ABNORMAL
EOSINOPHIL # BLD: 0 K/UL (ref 0–0.4)
EOSINOPHIL NFR BLD: 0 % (ref 0–5)
ERYTHROCYTE [DISTWIDTH] IN BLOOD BY AUTOMATED COUNT: 14.7 % (ref 11.6–14.5)
FERRITIN SERPL-MCNC: 253 NG/ML (ref 8–388)
GLOBULIN SER CALC-MCNC: 3.5 G/DL (ref 2–4)
GLUCOSE SERPL-MCNC: 74 MG/DL (ref 74–99)
HCT VFR BLD AUTO: 32.3 % (ref 35–45)
HGB BLD-MCNC: 10.7 G/DL (ref 12–16)
IRON SATN MFR SERPL: 19 %
IRON SERPL-MCNC: 44 UG/DL (ref 50–175)
LYMPHOCYTES # BLD: 1.1 K/UL (ref 0.8–3.5)
LYMPHOCYTES NFR BLD: 69 % (ref 20–51)
MCH RBC QN AUTO: 31.6 PG (ref 24–34)
MCHC RBC AUTO-ENTMCNC: 33.1 G/DL (ref 31–37)
MCV RBC AUTO: 95.3 FL (ref 74–97)
MONOCYTES # BLD: 0.4 K/UL (ref 0–1)
MONOCYTES NFR BLD: 26 % (ref 2–9)
NEUTS BAND NFR BLD MANUAL: 2 % (ref 0–5)
NEUTS SEG # BLD: 0.1 K/UL (ref 1.8–8)
NEUTS SEG NFR BLD: 3 % (ref 42–75)
PLATELET # BLD AUTO: 103 K/UL (ref 135–420)
PLATELET COMMENTS,PCOM: ABNORMAL
PMV BLD AUTO: 10 FL (ref 9.2–11.8)
POTASSIUM SERPL-SCNC: 3.9 MMOL/L (ref 3.5–5.5)
PROT SERPL-MCNC: 6.9 G/DL (ref 6.4–8.2)
RBC # BLD AUTO: 3.39 M/UL (ref 4.2–5.3)
RBC MORPH BLD: ABNORMAL
RBC MORPH BLD: ABNORMAL
SODIUM SERPL-SCNC: 137 MMOL/L (ref 136–145)
TIBC SERPL-MCNC: 227 UG/DL (ref 250–450)
WBC # BLD AUTO: 1.6 K/UL (ref 4.6–13.2)

## 2018-09-19 PROCEDURE — 82728 ASSAY OF FERRITIN: CPT | Performed by: INTERNAL MEDICINE

## 2018-09-19 PROCEDURE — 80053 COMPREHEN METABOLIC PANEL: CPT | Performed by: INTERNAL MEDICINE

## 2018-09-19 PROCEDURE — 83540 ASSAY OF IRON: CPT | Performed by: INTERNAL MEDICINE

## 2018-09-19 NOTE — PROGRESS NOTES
Hematology/Oncology  Progress Note Name: Amara Barber Date: 2018 : 1960 PCP: Vianney Benoit MD  
 
Ms. Hemant Davis is a 62 y.o. -American woman with HER-2 positive invasive ductal adenocarcinoma the right breast 
 
Current therapy: Carboplatin, Taxotere, and Herceptin plus Perjeda. (Cycle 3 will be started on 5/15/2018) Subjective: Ms. Hemant Davis is a 22-year-old -American woman who is currently receiving neoadjuvant systemic therapy for her HER-2 positive invasive ductal adenocarcinoma the right breast.  On 9/10/2018 she completed cycle 6 of her treatment. The patient reports that she tolerated the medication well. She did not experience any significant nausea or emesis. She has experienced a mild degree of fatigue. Today, she has no new or additional complaints to report. Past medical history, family history, and social history: these were reviewed and remains unchanged. Past Medical History:  
Diagnosis Date  Cancer ([facility] [address] right breast  
 Hepatitis B   
 Tuberculosis  Past Surgical History:  
Procedure Laterality Date  HX GYN    
 hysterectomy  HX OTHER SURGICAL    
 neck surgery  [place] INSJ PRPH CTR VAD W/SUBQ PORT AGE 5 YR/> N/A 2018 Dr. Gardenia Decker Social History Social History  Marital status: SINGLE Spouse name: N/A  
 Number of children: N/A  
 Years of education: N/A Occupational History  Not on file. Social History Main Topics  Smoking status: Never Smoker  Smokeless tobacco: Current User  Alcohol use 1.2 oz/week 2 Cans of beer per week Comment: weekly  Drug use: No  
 Sexual activity: No  
 
Other Topics Concern  Not on file Social History Narrative Family History Problem Relation Age of Onset  Stroke Mother  Heart Disease Father  Cancer Maternal Grandmother Current Outpatient Prescriptions Medication Sig Dispense Refill  potassium chloride SA (MICRO-K) 10 mEq capsule TAKE 1 CAPSULE BY MOUTH ONCE DAILY 30 Cap 0  
 dexamethasone (DECADRON) 4 mg tablet Take 8 mg once po daily the day before Chemotherapy, the day of chemo and the day after chemo 24 Tab 3  
 famotidine (PEPCID) 40 mg tablet Take 1 Tab by mouth daily. 30 Tab 1  
 HYDROcodone-acetaminophen (NORCO) 5-325 mg per tablet Take 1 Tab by mouth every six (6) hours as needed for Pain. Max Daily Amount: 4 Tabs. 60 Tab 0  
 calcium carbonate (CALCIUM 500) 500 mg calcium (1,250 mg) chewable tablet Take 1 Tab by mouth two (2) times a day. Indications: hypocalcemia 60 Tab 1  prochlorperazine (COMPAZINE) 10 mg tablet Take one tab po every 6 hours with benadryl 25 mg starting the night of chemo and for three days then PRN 60 Tab 2  
 lidocaine-prilocaine (EMLA) topical cream Apply a small amount to port area one hour prior to chemo and cover with saran wrap 30 g 3  warfarin (COUMADIN) 1 mg tablet Take one tab po daily at 6 pm or after 30 Tab 6  
 ondansetron hcl (ZOFRAN, AS HYDROCHLORIDE,) 8 mg tablet Take one tab every 8 hours for nausea starting the night of chemo and for three days 30 Tab 3  cephALEXin (KEFLEX) 250 mg capsule Take 250 mg by mouth two (2) times a day.  sucralfate (CARAFATE) 1 gram tablet 1 g. Review of Systems Constitutional: The patient has complaints of a mild degree of fatigue and some weakness following chemotherapy. She has a noticeable increase in her food intake due to increasing appetite following chemotherapy. HEENT: The patient denies recent head trauma, eye pain, blurred vision,  hearing deficit, oropharyngeal mucosal pain or lesions, and the patient denies throat pain or discomfort. Lymphatics: The patient denies palpable peripheral lymphadenopathy. Hematologic: The patient denies having bruising, bleeding, or progressive fatigue.  
Respiratory: Patient denies having shortness of breath, cough, sputum production, fever, or dyspnea on exertion. Cardiovascular: The patient denies having leg pain, leg swelling, heart palpitations, chest permit, chest pain, or lightheadedness. The patient denies having dyspnea on exertion. Gastrointestinal: The patient denies having nausea, emesis, or diarrhea. The patient denies having any hematemesis or blood in the stool. Genitourinary: Patient denies having urinary urgency, frequency, or dysuria. The patient denies having blood in the urine. Psychological: The patient denies having symptoms of nervousness, anxiety, depression, or thoughts of harming self. Skin: Patient denies having skin rashes, skin, ulcerations, or unexplained itching or pruritus. Musculoskeletal: The patient denies having pain in the joints or bones. Objective:  
 
Visit Vitals  /69 (BP 1 Location: Right arm, BP Patient Position: Sitting)  Pulse 91  Temp 98.4 °F (36.9 °C) (Oral)  Resp 16  
 Ht 5' 5\" (1.651 m)  Wt 50.8 kg (112 lb)  BMI 18.64 kg/m2 ECOG PS=0 Physical Exam:  
Gen. Appearance: The patient is in no acute distress. Skin: There is no bruise or rash. HEENT: The exam is unremarkable. Neck: Supple without lymphadenopathy or thyromegaly. Lungs: Clear to auscultation and percussion; there are no wheezes or rhonchi. Heart: Regular rate and rhythm; there are no murmurs, gallops, or rubs. Anterior chest wall and breast: There is no change in the exam.  Abdomen: Bowel sounds are present and normal.  There is no guarding, tenderness, or hepatosplenomegaly. Extremities: There is no clubbing, cyanosis, or edema. Neurologic: There are no focal neurologic deficits. Lymphatics: There is no palpable peripheral lymphadenopathy. Musculoskeletal: The patient has full range of motion at all joints. There is no evidence of joint deformity or effusions. There is no focal joint tenderness.  
Psychological/psychiatric: There is no clinical evidence of anxiety, depression, or melancholy. Lab data: 
   
Results for orders placed or performed during the hospital encounter of 09/10/18 CBC WITH 3 PART DIFF     Status: Abnormal  
Result Value Ref Range Status WBC 4.8 4.5 - 13.0 K/uL Final  
 RBC 3.40 (L) 4.10 - 5.10 M/uL Final  
 HGB 11.1 (L) 12.0 - 16 g/dL Final  
 HCT 33.7 (L) 36 - 48 % Final  
 MCV 99.1 78 - 102 FL Final  
 MCH 32.6 25.0 - 35.0 PG Final  
 MCHC 32.9 31 - 37 g/dL Final  
 RDW 16.1 (H) 11.5 - 14.5 % Final  
 PLATELET 461 798 - 735 K/uL Final  
 NEUTROPHILS 87 (H) 40 - 70 % Final  
 MIXED CELLS 3 0.1 - 17 % Final  
 LYMPHOCYTES 10 (L) 14 - 44 % Final  
 ABS. NEUTROPHILS 4.2 1.8 - 9.5 K/UL Final  
 ABS. MIXED CELLS 0.1 0.0 - 2.3 K/uL Final  
 ABS. LYMPHOCYTES 0.5 (L) 1.1 - 5.9 K/UL Final  
  Comment: Test performed at [place] [address] Location. Results Reviewed by Medical Director. DF AUTOMATED   Final  
 
 
   
Assessment:  
 
1. Malignant neoplasm of upper-outer quadrant of right breast in female, estrogen receptor negative ([facility] [address] 2. Antineoplastic chemotherapy induced anemia 3. Fatigue due to treatment 4. Gastroesophageal reflux disease without esophagitis Plan:  
Invasive ductal adenocarcinoma right upper quadrant of the breast: Cycle #6 of her treatment program with carboplatin based regimen and Herceptin based regimen was completed on 9/10/2018. At this point we will refer the patient back to her surgeon so that she can undergo her surgical procedure. Following surgery I have explained to the patient is she will need to complete Herceptin for 1 full year. Therefore I will plan to see her back in clinic in 4-6 weeks to plan the completion of her adjuvant Herceptin therapy. Chemotherapy-induced anemia: I will check her iron profile and ferritin levels at this time. I have instructed the patient to begin taking Slow Fe 1 tablet daily.  She was previously started on Procrit 60,000 unit with her chemotherapy treatment. Her hemoglobin today is 10.6 g/dL with hematocrit 33%. Fatigue due to treatment: pt will continue to stay well-hydrated particularly on the weeks of chemotherapy and get  Lots of rest.  
 
Follow-up in 4 weeks Orders Placed This Encounter  COMPLETE CBC & AUTO DIFF WBC  InHouse CBC ([employer]) Standing Status:   Future Number of Occurrences:   1 Standing Expiration Date:   9/26/2018  METABOLIC PANEL, COMPREHENSIVE Standing Status:   Future Standing Expiration Date:   9/20/2019  
 IRON PROFILE Standing Status:   Future Standing Expiration Date:   9/20/2019  FERRITIN Standing Status:   Future Standing Expiration Date:   9/20/2019 Nestor Carreon MD 
9/19/2018 Please note: This document has been produced using voice recognition software. Unrecognized errors in transcription may be present.

## 2018-09-19 NOTE — MR AVS SNAPSHOT
[address] 
[phone] Patient: Stella Ferrer MRN: [id number] [facility]:0/1/1412 Visit Information Date & Time Provider Department Dept. Phone Encounter #  
 9/19/2018  9:15 AM Katey Garcia MD [facility]cology [phone] [id number] Follow-up Instructions Return in about 4 weeks (around 10/17/2018). Your Appointments 9/25/2018  2:00 PM  
Follow Up [facility] Appt Note: Re: Finished Chemo and ready to have surgery scheduled. / Per Dr. Anam Tran. / Marquita Bo Thad 240 [facility] [address] [phone]  
  
    
 10/31/2018 11:00 AM  
Office Visit with Katey Garcia MD  
[address] [place] [address] Oncology [facility]) Appt Note: 6 WK RET  
 [address], [address] [place] [address] Upcoming Health Maintenance Date Due Pneumococcal 19-64 Highest Risk (1 of 3 - PCV13) 9/5/1979 DTaP/Tdap/Td series (1 - Tdap) 9/5/1981 PAP AKA CERVICAL CYTOLOGY 9/5/1981 BREAST CANCER SCRN MAMMOGRAM 9/5/2010 FOBT Q 1 YEAR AGE 50-75 9/5/2010 Influenza Age 5 to Adult 8/1/2018 Allergies as of 9/19/2018  Review Complete On: 9/19/2018 By: Britney Public No Known Allergies Current Immunizations  Reviewed on 9/17/2018 No immunizations on file. Not reviewed this visit You Were Diagnosed With   
  
 Codes Comments Malignant neoplasm of upper-outer quadrant of right breast in female, estrogen receptor negative ([facility] [address]    -  Primary ICD-10-CM: C50.411, Z17.1 ICD-9-CM: 174.4, V86.1  Antineoplastic chemotherapy induced anemia     ICD-10-CM: D64.81, T45.1X5A 
ICD-9-CM: 285.3, E933.1 Fatigue due to treatment     ICD-10-CM: R53.83 ICD-9-CM: 780.79 Gastroesophageal reflux disease without esophagitis     ICD-10-CM: K21.9 ICD-9-CM: 530.81 Vitals BP Pulse Temp Resp Height(growth percentile) Weight(growth percentile) 103/69 (BP 1 Location: Right arm, BP Patient Position: Sitting) 91 98.4 °F (36.9 °C) (Oral) 16 5' 5\" (1.651 m) 112 lb (50.8 kg) BMI OB Status Smoking Status 18.64 kg/m2 Hysterectomy Never Smoker BMI and BSA Data Body Mass Index Body Surface Area  
 18.64 kg/m 2 1.53 m 2 Preferred Pharmacy Pharmacy Name Phone [address], [address] [place] [phone] Your Updated Medication List  
  
   
This list is accurate as of 9/19/18 10:11 AM.  Always use your most recent med list.  
  
  
  
  
 calcium carbonate 500 mg calcium (1,250 mg) chewable tablet Commonly known as:  CALCIUM 500 Take 1 Tab by mouth two (2) times a day. Indications: hypocalcemia  
  
 dexamethasone 4 mg tablet Commonly known as:  DECADRON Take 8 mg once po daily the day before Chemotherapy, the day of chemo and the day after chemo  
  
 famotidine 40 mg tablet Commonly known as:  PEPCID Take 1 Tab by mouth daily. HYDROcodone-acetaminophen 5-325 mg per tablet Commonly known as:  Lele Beckerall Take 1 Tab by mouth every six (6) hours as needed for Pain. Max Daily Amount: 4 Tabs. KEFLEX 250 mg capsule Generic drug:  cephALEXin Take 250 mg by mouth two (2) times a day. lidocaine-prilocaine topical cream  
Commonly known as:  EMLA Apply a small amount to port area one hour prior to chemo and cover with saran wrap  
  
 ondansetron hcl 8 mg tablet Commonly known as:  Shivam Womack Take one tab every 8 hours for nausea starting the night of chemo and for three days  
  
 potassium chloride SA 10 mEq capsule Commonly known as:  Kristi Arguello  
 TAKE 1 CAPSULE BY MOUTH ONCE DAILY prochlorperazine 10 mg tablet Commonly known as:  COMPAZINE Take one tab po every 6 hours with benadryl 25 mg starting the night of chemo and for three days then PRN  
  
 sucralfate 1 gram tablet Commonly known as:  CARAFATE  
1 g.  
  
 warfarin 1 mg tablet Commonly known as:  COUMADIN Take one tab po daily at 6 pm or after We Performed the Following COMPLETE CBC & AUTO DIFF WBC [41257 CPT(R)] Follow-up Instructions Return in about 4 weeks (around 10/17/2018). To-Do List   
 09/19/2018 Lab:  CBC WITH 3 PART DIFF   
  
 09/19/2018 Lab:  FERRITIN   
  
 09/19/2018 Lab:  IRON PROFILE   
  
 09/19/2018 Lab:  METABOLIC PANEL, COMPREHENSIVE   
  
 10/08/2018 3:00 PM  
  Appointment with [facility] FAST TRACK NURSE at [facility] OP INFUSION ([phone]) Patient Instructions Breast Cancer: Care Instructions Your Care Instructions Breast cancer occurs when abnormal cells grow out of control in the breast. These cancer cells can spread within the breast, to nearby lymph nodes and other tissues, and to other parts of the body. Being treated for cancer can weaken your body, and you may feel very tired. Get the rest your body needs so you can feel better. Finding out that you have cancer is scary. You may feel many emotions and may need some help coping. Seek out family, friends, and counselors for support. You also can do things at home to make yourself feel better while you go through treatment. Call the [employer] (2-466.103.6649) or visit its website at 6387 Like.com. Packetzoom for more information. Follow-up care is a key part of your treatment and safety. Be sure to make and go to all appointments, and call your doctor if you are having problems. It's also a good idea to know your test results and keep a list of the medicines you take. How can you care for yourself at home? · Take your medicines exactly as prescribed. Call your doctor if you think you are having a problem with your medicine. You may get medicine for nausea and vomiting if you have these side effects. · Follow your doctor's instructions to relieve pain. Pain from cancer and surgery can almost always be controlled. Use pain medicine when you first notice pain, before it becomes severe. · Eat healthy food. If you do not feel like eating, try to eat food that has protein and extra calories to keep up your strength and prevent weight loss. Drink liquid meal replacements for extra calories and protein. Try to eat your main meal early. · Get some physical activity every day, but do not get too tired. Keep doing the hobbies you enjoy as your energy allows. · Do not smoke. Smoking can make your cancer worse. If you need help quitting, talk to your doctor about stop-smoking programs and medicines. These can increase your chances of quitting for good. · Take steps to control your stress and workload. Learn relaxation techniques. ¨ Share your feelings. Stress and tension affect our emotions. By expressing your feelings to others, you may be able to understand and cope with them. ¨ Consider joining a support group. Talking about a problem with your spouse, a good friend, or other people with similar problems is a good way to reduce tension and stress. ¨ Express yourself through art. Try writing, crafts, dance, or art to relieve stress. Some dance, writing, or art groups may be available just for people who have cancer. ¨ Be kind to your body and mind. Getting enough sleep, eating a healthy diet, and taking time to do things you enjoy can contribute to an overall feeling of balance in your life and can help reduce stress. ¨ Get help if you need it. Discuss your concerns with your doctor or counselor. · If you are vomiting or have diarrhea: ¨ Drink plenty of fluids (enough so that your urine is light yellow or clear like water) to prevent dehydration. Choose water and other caffeine-free clear liquids. If you have kidney, heart, or liver disease and have to limit fluids, talk with your doctor before you increase the amount of fluids you drink. ¨ When you are able to eat, try clear soups, mild foods, and liquids until all symptoms are gone for 12 to 48 hours. Other good choices include dry toast, crackers, cooked cereal, and gelatin dessert, such as Jell-O. · If you have not already done so, prepare a list of advance directives. Advance directives are instructions to your doctor and family members about what kind of care you want if you become unable to speak or express yourself. When should you call for help? Call 911 anytime you think you may need emergency care. For example, call if: 
  · You passed out (lost consciousness).  
 Call your doctor now or seek immediate medical care if: 
  · You have a fever.  
  · You have abnormal bleeding.  
  · You think you have an infection.  
  · You have new or worse pain.  
  · You have new symptoms, such as a cough, belly pain, vomiting, diarrhea, or a rash.  
 Watch closely for changes in your health, and be sure to contact your doctor if: 
  · You are much more tired than usual.  
  · You have swollen glands in your armpits, groin, or neck.  
  · You do not get better as expected. Where can you learn more? Go to http://catalino-tiesha.info/. Enter V321 in the search box to learn more about \"Breast Cancer: Care Instructions. \" Current as of: May 12, 2017 Content Version: 11.7 © 1596-7492 SolarGreen. Care instructions adapted under license by LimeTray (which disclaims liability or warranty for this information). If you have questions about a medical condition or this instruction, always ask your healthcare professional. [address] any warranty or liability for your use of this information. Introducing [facility]! Waldo Tucker introduces Angry Citizen patient portal. Now you can access parts of your medical record, email your doctor's office, and request medication refills online. 1. In your internet browser, go to [email or web address] 2. Click on the First Time User? Click Here link in the Sign In box. You will see the New Member Sign Up page. 3. Enter your Angry Citizen Access Code exactly as it appears below. You will not need to use this code after youve completed the sign-up process. If you do not sign up before the expiration date, you must request a new code. · Angry Citizen Access Code: [email or web address] Expires: 11/19/2018  9:24 AM 
 
4. Enter the last four digits of your Social Security Number (xxxx) and Date of Birth (mm/dd/yyyy) as indicated and click Submit. You will be taken to the next sign-up page. 5. Create a Angry Citizen ID. This will be your Angry Citizen login ID and cannot be changed, so think of one that is secure and easy to remember. 6. Create a Angry Citizen password. You can change your password at any time. 7. Enter your Password Reset Question and Answer. This can be used at a later time if you forget your password. 8. Enter your e-mail address. You will receive e-mail notification when new information is available in [address] 9. Click Sign Up. You can now view and download portions of your medical record. 10. Click the Download Summary menu link to download a portable copy of your medical information. If you have questions, please visit the Frequently Asked Questions section of the Angry Citizen website. Remember, Angry Citizen is NOT to be used for urgent needs. For medical emergencies, dial 911. Now available from your iPhone and Android! Please provide this summary of care documentation to your next provider. Your primary care clinician is listed as Clair Lomeli. If you have any questions after today's visit, please call [phone].

## 2018-09-19 NOTE — PATIENT INSTRUCTIONS
Breast Cancer: Care Instructions Your Care Instructions Breast cancer occurs when abnormal cells grow out of control in the breast. These cancer cells can spread within the breast, to nearby lymph nodes and other tissues, and to other parts of the body. Being treated for cancer can weaken your body, and you may feel very tired. Get the rest your body needs so you can feel better. Finding out that you have cancer is scary. You may feel many emotions and may need some help coping. Seek out family, friends, and counselors for support. You also can do things at home to make yourself feel better while you go through treatment. Call the [employer] (5-997.391.9194) or visit its website at Roll205 Hatchtech for more information. Follow-up care is a key part of your treatment and safety. Be sure to make and go to all appointments, and call your doctor if you are having problems. It's also a good idea to know your test results and keep a list of the medicines you take. How can you care for yourself at home? · Take your medicines exactly as prescribed. Call your doctor if you think you are having a problem with your medicine. You may get medicine for nausea and vomiting if you have these side effects. · Follow your doctor's instructions to relieve pain. Pain from cancer and surgery can almost always be controlled. Use pain medicine when you first notice pain, before it becomes severe. · Eat healthy food. If you do not feel like eating, try to eat food that has protein and extra calories to keep up your strength and prevent weight loss. Drink liquid meal replacements for extra calories and protein. Try to eat your main meal early. · Get some physical activity every day, but do not get too tired. Keep doing the hobbies you enjoy as your energy allows. · Do not smoke. Smoking can make your cancer worse. If you need help quitting, talk to your doctor about stop-smoking programs and medicines. These can increase your chances of quitting for good. · Take steps to control your stress and workload. Learn relaxation techniques. ¨ Share your feelings. Stress and tension affect our emotions. By expressing your feelings to others, you may be able to understand and cope with them. ¨ Consider joining a support group. Talking about a problem with your spouse, a good friend, or other people with similar problems is a good way to reduce tension and stress. ¨ Express yourself through art. Try writing, crafts, dance, or art to relieve stress. Some dance, writing, or art groups may be available just for people who have cancer. ¨ Be kind to your body and mind. Getting enough sleep, eating a healthy diet, and taking time to do things you enjoy can contribute to an overall feeling of balance in your life and can help reduce stress. ¨ Get help if you need it. Discuss your concerns with your doctor or counselor. · If you are vomiting or have diarrhea: ¨ Drink plenty of fluids (enough so that your urine is light yellow or clear like water) to prevent dehydration. Choose water and other caffeine-free clear liquids. If you have kidney, heart, or liver disease and have to limit fluids, talk with your doctor before you increase the amount of fluids you drink. ¨ When you are able to eat, try clear soups, mild foods, and liquids until all symptoms are gone for 12 to 48 hours. Other good choices include dry toast, crackers, cooked cereal, and gelatin dessert, such as Jell-O. · If you have not already done so, prepare a list of advance directives. Advance directives are instructions to your doctor and family members about what kind of care you want if you become unable to speak or express yourself. When should you call for help? Call 911 anytime you think you may need emergency care. For example, call if: 
  · You passed out (lost consciousness).  
 Call your doctor now or seek immediate medical care if:   · You have a fever.  
  · You have abnormal bleeding.  
  · You think you have an infection.  
  · You have new or worse pain.  
  · You have new symptoms, such as a cough, belly pain, vomiting, diarrhea, or a rash.  
 Watch closely for changes in your health, and be sure to contact your doctor if: 
  · You are much more tired than usual.  
  · You have swollen glands in your armpits, groin, or neck.  
  · You do not get better as expected. Where can you learn more? Go to http://catalino-tiesha.info/. Enter V321 in the search box to learn more about \"Breast Cancer: Care Instructions. \" Current as of: May 12, 2017 Content Version: 11.7 © 4139-4323 [employer] Protein Technologies, Placemeter. Care instructions adapted under license by NewYork60.com (which disclaims liability or warranty for this information). If you have questions about a medical condition or this instruction, always ask your healthcare professional. [address] any warranty or liability for your use of this information.

## 2018-09-26 ENCOUNTER — OFFICE VISIT (OUTPATIENT)
Dept: SURGERY | Age: 58
End: 2018-09-26

## 2018-09-26 VITALS
HEART RATE: 99 BPM | BODY MASS INDEX: 19.33 KG/M2 | OXYGEN SATURATION: 99 % | TEMPERATURE: 98.4 F | SYSTOLIC BLOOD PRESSURE: 108 MMHG | HEIGHT: 65 IN | RESPIRATION RATE: 16 BRPM | DIASTOLIC BLOOD PRESSURE: 78 MMHG | WEIGHT: 116 LBS

## 2018-09-26 DIAGNOSIS — C50.411 MALIGNANT NEOPLASM OF UPPER-OUTER QUADRANT OF RIGHT BREAST IN FEMALE, ESTROGEN RECEPTOR NEGATIVE (HCC): Primary | ICD-10-CM

## 2018-09-26 DIAGNOSIS — Z17.1 MALIGNANT NEOPLASM OF UPPER-OUTER QUADRANT OF RIGHT BREAST IN FEMALE, ESTROGEN RECEPTOR NEGATIVE (HCC): Primary | ICD-10-CM

## 2018-09-26 NOTE — PATIENT INSTRUCTIONS
Learning About Breast Cancer Treatments  Your Care Instructions    Breast cancer means abnormal cells grow out of control in one or both breasts. These cancer cells can spread from the breast to nearby lymph nodes and other tissues. They can also spread to other parts of the body. The type and stage of cancer you have is based on:  · Where in the breast the cancer started. · The genetics of those cancer cells. · How far cancer has spread within the breast, to nearby tissues, or to other organs. · What the cancer cells look like under a microscope. · Whether there is cancer in the nearby lymph nodes. Tests that help find the stage of your cancer can help choose the right treatment for you. These tests can include a breast biopsy, lymph node biopsy, blood tests, and X-rays. You may need other tests as well, such as a bone, CT, or MRI scan. Whether you have more tests depends on your symptoms and the stage of the cancer. When you find out that you have cancer, you may feel many emotions and may need some help coping. Seek out family, friends, and counselors for support. You also can do things at home to make yourself feel better while you go through treatment. Call the [employer] (5-165.672.8978) or visit its website at [employer] [employer]. DGTS for more information. How is breast cancer treated? Your doctor may combine treatments. This is a common way to treat breast cancer. Treatment depends on what type and stage of cancer you have. You may have:  · Surgery to remove the cancer. · Radiation. This uses high-dose X-rays to kill cancer cells and shrink tumors. · Chemotherapy. This uses medicine to kill cancer cells. · Hormone therapy. This uses medicines such as tamoxifen. It limits the effect of the hormone estrogen. This hormone can help some types of breast cancer cells to grow. · Biological therapy.  This uses medicines such as trastuzumab (Herceptin) to help your immune system fight the cancer. What surgeries are done for breast cancer? Surgery is a key part of treatment for breast cancer. The main types of surgeries are:  · Breast-conserving surgery. This is surgery that does not remove the whole breast. This includes:  ¨ Lumpectomy. This surgery removes the cancer in the breast and some of the normal tissue around it. ¨ Partial mastectomy. This surgery removes part of the breast. The lining of the chest muscles below the cancer and some of the lymph nodes may also be removed. · Surgery to remove the breast. This includes:  ¨ Total mastectomy. This removes the whole breast.  ¨ Nipple-sparing mastectomy. This removes the whole breast but leaves the nipple. ¨ Modified radical mastectomy. This removes the whole breast and the lymph nodes under the arm (axillary lymph nodes). ¨ Radical mastectomy. This removes the whole breast, the chest muscles, and all the lymph nodes under the arm. If lymph nodes under the arm are removed, they are looked at under the microscope to check for cancer. There are two types of lymph node removal:  · [place] lymph node biopsy removes the lymph node that is the first to receive lymph fluid from the tumor. If cancer has spread from the breast to the lymph nodes, cancer cells most often show up in the sentinel node first.  · Axillary lymph node dissection removes most of the lymph nodes in the armpit. The type of surgery you have depends on the size, location, and type of the cancer. It also depends on your overall health and personal preferences. Even if your doctor removes all the cancer that can be seen at the time of your surgery, you may still need more treatment. You may get radiation, chemotherapy, or hormone therapy after surgery to try to kill any cancer cells that may be left. No matter what kind of surgery you have, you will get information about why you are having it, what its risks are, how to prepare, and what to do after surgery.   Follow-up care is a key part of your treatment and safety. Be sure to make and go to all appointments, and call your doctor if you are having problems. It's also a good idea to know your test results and keep a list of the medicines you take. Where can you learn more? Go to http://catalino-tiesha.info/. Enter X810 in the search box to learn more about \"Learning About Breast Cancer Treatments. \"  Current as of: May 12, 2017  Content Version: 11.7  © 2034-6148 [employer]. Care instructions adapted under license by TransEnergy (which disclaims liability or warranty for this information). If you have questions about a medical condition or this instruction, always ask your healthcare professional. [address] any warranty or liability for your use of this information.

## 2018-09-26 NOTE — PROGRESS NOTES
Chief Complaint   Patient presents with    Breast Cancer     pt finished chemo and is ready to have surgery. Stage 1 cancer right Breast     1. Have you been to the ER, urgent care clinic since your last visit? Hospitalized since your last visit? No    2. Have you seen or consulted any other health care providers outside of the [address] [place] [address] since your last visit? Include any pap smears or colon screening.  No

## 2018-09-26 NOTE — PROGRESS NOTES
Cloteal Pillar. Sada Hickey, MASOUDP-C  PROGRESS NOTE    Subjective:    Ms. Augustina Mcelroy is a very pleasant 63 yo female who presents today with a female family member to discuss next steps. She has recently (September 10) completed 6 cycles of neoadjuvant chemotherapy for HER2 + right breast cancer. She reports tolerating the therapy well and denies having any nausea, vomiting or fatigue. Before we can schedule her surgery, she will need to have a new mammogram. We discussed the significance of this imaging and they both understand. Caregiver has requested a Friday afternoon appointment for mammogram and an afternoon appointment with Dr. Mina Reid next Wednesday as her father will be having surgery that morning. I answered all questions to their satisfaction. Objective:    Vitals:    09/26/18 1454   BP: 108/78   Pulse: 99   Resp: 16   Temp: 98.4 °F (36.9 °C)   TempSrc: Oral   SpO2: 99%   Weight: 52.6 kg (116 lb)   Height: 5' 5\" (1.651 m)       Physical Exam:    General: Alert and oriented x 3, cooperative, in no apparent distress   Breasts:    Left:  Deferred   Right: deferred    Current Medications:  Current Outpatient Prescriptions   Medication Sig Dispense Refill    potassium chloride SA (MICRO-K) 10 mEq capsule TAKE 1 CAPSULE BY MOUTH ONCE DAILY 30 Cap 0    dexamethasone (DECADRON) 4 mg tablet Take 8 mg once po daily the day before Chemotherapy, the day of chemo and the day after chemo 24 Tab 3    famotidine (PEPCID) 40 mg tablet Take 1 Tab by mouth daily. 30 Tab 1    HYDROcodone-acetaminophen (NORCO) 5-325 mg per tablet Take 1 Tab by mouth every six (6) hours as needed for Pain. Max Daily Amount: 4 Tabs. 60 Tab 0    calcium carbonate (CALCIUM 500) 500 mg calcium (1,250 mg) chewable tablet Take 1 Tab by mouth two (2) times a day.  Indications: hypocalcemia 60 Tab 1    prochlorperazine (COMPAZINE) 10 mg tablet Take one tab po every 6 hours with benadryl 25 mg starting the night of chemo and for three days then PRN 60 Tab 2  lidocaine-prilocaine (EMLA) topical cream Apply a small amount to port area one hour prior to chemo and cover with saran wrap 30 g 3    warfarin (COUMADIN) 1 mg tablet Take one tab po daily at 6 pm or after 30 Tab 6    ondansetron hcl (ZOFRAN, AS HYDROCHLORIDE,) 8 mg tablet Take one tab every 8 hours for nausea starting the night of chemo and for three days 30 Tab 3    cephALEXin (KEFLEX) 250 mg capsule Take 250 mg by mouth two (2) times a day.  sucralfate (CARAFATE) 1 gram tablet 1 g. Chart and notes reviewed. Data reviewed. I have evaluated and examined the patient. Impression:  · Patient with right, HER2+, invasive ductal carcinoma who has completed 6 cycles of Herceptin based systemic therapy here today to be evaluated for definitive surgical treatment. Plan:   · Right diagnostic mammogram this week  · Follow up with Dr. Verlin Koyanagi in one week    Ms. Reyna Bailon has a reminder for a \"due or due soon\" health maintenance. I have asked that she contact her primary care provider for follow-up on this health maintenance. Sebastian Wynn.  Barry Vazquez, MASOUDP-C

## 2018-09-28 ENCOUNTER — HOSPITAL ENCOUNTER (OUTPATIENT)
Dept: MAMMOGRAPHY | Age: 58
Discharge: HOME | End: 2018-09-28
Attending: NURSE PRACTITIONER
Payer: MEDICAID

## 2018-09-28 ENCOUNTER — HOSPITAL ENCOUNTER (OUTPATIENT)
Dept: ULTRASOUND IMAGING | Age: 58
Discharge: HOME | End: 2018-09-28
Attending: NURSE PRACTITIONER
Payer: MEDICAID

## 2018-09-28 DIAGNOSIS — C50.411 MALIGNANT NEOPLASM OF UPPER-OUTER QUADRANT OF RIGHT BREAST IN FEMALE, ESTROGEN RECEPTOR NEGATIVE (HCC): ICD-10-CM

## 2018-09-28 DIAGNOSIS — R92.8 ABNORMAL MAMMOGRAM OF RIGHT BREAST: Primary | ICD-10-CM

## 2018-09-28 DIAGNOSIS — Z85.3 HISTORY OF RIGHT BREAST CANCER: ICD-10-CM

## 2018-09-28 DIAGNOSIS — Z17.1 MALIGNANT NEOPLASM OF UPPER-OUTER QUADRANT OF RIGHT BREAST IN FEMALE, ESTROGEN RECEPTOR NEGATIVE (HCC): ICD-10-CM

## 2018-09-28 DIAGNOSIS — R92.8 ABNORMAL MAMMOGRAM OF RIGHT BREAST: ICD-10-CM

## 2018-09-28 PROCEDURE — 76642 ULTRASOUND BREAST LIMITED: CPT

## 2018-09-28 PROCEDURE — 77061 BREAST TOMOSYNTHESIS UNI: CPT

## 2018-10-03 ENCOUNTER — OFFICE VISIT (OUTPATIENT)
Dept: SURGERY | Age: 58
End: 2018-10-03

## 2018-10-03 VITALS
HEART RATE: 68 BPM | WEIGHT: 114 LBS | OXYGEN SATURATION: 98 % | SYSTOLIC BLOOD PRESSURE: 118 MMHG | HEIGHT: 65 IN | DIASTOLIC BLOOD PRESSURE: 68 MMHG | BODY MASS INDEX: 18.99 KG/M2 | RESPIRATION RATE: 16 BRPM | TEMPERATURE: 98.6 F

## 2018-10-03 DIAGNOSIS — C50.911 STAGE 1 BREAST CANCER, ER-, RIGHT (HCC): Primary | ICD-10-CM

## 2018-10-03 DIAGNOSIS — Z17.1 STAGE 1 BREAST CANCER, ER-, RIGHT (HCC): Primary | ICD-10-CM

## 2018-10-03 NOTE — H&P (VIEW-ONLY)
[address] [facility] General Surgery Subjective: HPI: Patient is a very pleasant 60-year-old female with a past medical history remarkable for hepatitis B and tuberculosis 1980 and recently diagnosed right breast cancer upper outer quadrant stage I ER negative HER-2 positive status post neoadjuvant monoclonal antibody therapy with Perjetta and Herceptin. Diagnostic mammogram of the right breast which are reviewed independently on the monitor reveals no new lesions within the right breast.  The BI-RADS 6 malignancy at the clip adjacent is still present. Patient Active Problem List  
 Diagnosis Date Noted  Hypokalemia 04/19/2018  Antineoplastic chemotherapy induced anemia 04/18/2018  Fatigue due to treatment 04/18/2018  Breast cancer, right ([facility] [address] 03/08/2018  Malignant neoplasm of upper-outer quadrant of right breast in female, estrogen receptor negative ([facility] [facility] 02/14/2018  Viral hepatitis B with hepatic coma 02/14/2018  GERD (gastroesophageal reflux disease) 09/05/2014 Past Medical History:  
Diagnosis Date  Cancer ([facility] [address] right breast  
 Hepatitis B   
 Tuberculosis 1980 Past Surgical History:  
Procedure Laterality Date  HX GYN    
 hysterectomy  HX OTHER SURGICAL    
 neck surgery  [place] INS[facility] PRPH CTR VAD W/SUBQ PORT AGE 5 YR/> N/A 03/08/2018 Dr. Tristen Ramirez Family History Problem Relation Age of Onset  Stroke Mother  Breast Cancer Mother  Heart Disease Father  Cancer Maternal Grandmother Social History Substance Use Topics  Smoking status: Never Smoker  Smokeless tobacco: Current User  Alcohol use 1.2 oz/week 2 Cans of beer per week Comment: weekly No Known Allergies Prior to Admission medications Medication Sig Start Date End Date Taking?  Authorizing Provider  
potassium chloride SA (MICRO-K) 10 mEq capsule TAKE 1 CAPSULE BY MOUTH ONCE DAILY 8/13/18   Elizabeth Llnaos NP  
 dexamethasone (DECADRON) 4 mg tablet Take 8 mg once po daily the day before Chemotherapy, the day of chemo and the day after chemo 6/20/18   Alicia Mcallister NP  
famotidine (PEPCID) 40 mg tablet Take 1 Tab by mouth daily. 6/20/18   Alicia Mcallister NP  
HYDROcodone-acetaminophen (NORCO) 5-325 mg per tablet Take 1 Tab by mouth every six (6) hours as needed for Pain. Max Daily Amount: 4 Tabs. 6/20/18   Alicia Mcallister NP  
calcium carbonate (CALCIUM 500) 500 mg calcium (1,250 mg) chewable tablet Take 1 Tab by mouth two (2) times a day. Indications: hypocalcemia 4/19/18   Bertha Haro NP  
prochlorperazine (COMPAZINE) 10 mg tablet Take one tab po every 6 hours with benadryl 25 mg starting the night of chemo and for three days then PRN 3/23/18   Guicho Leblanc NP  
lidocaine-prilocaine (EMLA) topical cream Apply a small amount to port area one hour prior to chemo and cover with saran wrap 3/23/18   Guicho Leblanc NP  
warfarin (COUMADIN) 1 mg tablet Take one tab po daily at 6 pm or after 3/23/18   Guicho Leblanc NP  
ondansetron hcl (ZOFRAN, AS HYDROCHLORIDE,) 8 mg tablet Take one tab every 8 hours for nausea starting the night of chemo and for three days 3/23/18   Guicho Leblanc NP  
cephALEXin (KEFLEX) 250 mg capsule Take 250 mg by mouth two (2) times a day. Historical Provider  
sucralfate (CARAFATE) 1 gram tablet 1 g. Historical Provider Review of Systems:   
14 systems were reviewed. The results are as above in the HPI and otherwise negative. Objective:  
 
 
Physical Exam: 
GENERAL: alert, cooperative, no distress, appears stated age, EYE: conjunctivae/corneas clear. PERRL, EOM's intact. Fundi benign, THROAT & NECK: normal and no erythema or exudates noted. ,   
LYMPHATIC: Cervical, supraclavicular, and axillary nodes normal. ,  
LUNG: clear to auscultation bilaterally, HEART: regular rate and rhythm, S1, S2 normal, no murmur, click, rub or gallop BREAST: The breast are symmetrical.  No dimpling, retractions, skin changes or nipple retractions are visible. No axillary of supraclavicular lymphadenopathy bilaterally. No nipple retraction or discharge bilaterally. No breast masses bilaterally. ABDOMEN: soft, non-tender. Bowel sounds normal. No masses,  no organomegaly, EXTREMITIES:  extremities normal, atraumatic, no cyanosis or edema, SKIN: Normal., NEUROLOGIC: AOx3. Gait normal. Reflexes and motor strength normal and symmetric. Cranial nerves 2-12 and sensation grossly intact. ,  
 
Data Review: Mammography Ms. Vida Saab has a reminder for a \"due or due soon\" health maintenance. I have asked that she contact her primary care provider for follow-up on this health maintenance. Impression: · Patient with stage I ER negative HER-2 positive right breast cancer upper outer quadrant. Plan: · Needle localized lumpectomy right breast with right axillary sentinel lymph node biopsy · Consent on chart · Preoperative orders written Signed By: Reilly Coelho MD   
 October 3, 2018

## 2018-10-03 NOTE — PROGRESS NOTES
[address] [facility]  General Surgery    Subjective:      HPI: Patient is a very pleasant 60-year-old female with a past medical history remarkable for hepatitis B and tuberculosis 1980 and recently diagnosed right breast cancer upper outer quadrant stage I ER negative HER-2 positive status post neoadjuvant monoclonal antibody therapy with Perjetta and Herceptin. Diagnostic mammogram of the right breast which are reviewed independently on the monitor reveals no new lesions within the right breast.  The BI-RADS 6 malignancy at the clip adjacent is still present. Patient Active Problem List    Diagnosis Date Noted    Hypokalemia 04/19/2018    Antineoplastic chemotherapy induced anemia 04/18/2018    Fatigue due to treatment 04/18/2018    Breast cancer, right ([facility] [address] 03/08/2018    Malignant neoplasm of upper-outer quadrant of right breast in female, estrogen receptor negative ([facility] [address] 02/14/2018    Viral hepatitis B with hepatic coma 02/14/2018    GERD (gastroesophageal reflux disease) 09/05/2014     Past Medical History:   Diagnosis Date    Cancer ([facility] [address]     right breast    Hepatitis B     Tuberculosis 1980      Past Surgical History:   Procedure Laterality Date    HX GYN      hysterectomy     HX OTHER SURGICAL      neck surgery     [place] INSJ PRPH CTR VAD W/SUBQ PORT AGE 5 YR/> N/A 03/08/2018    Dr. Tristen Ramirez      Family History   Problem Relation Age of Onset    Stroke Mother     Breast Cancer Mother     Heart Disease Father     Cancer Maternal Grandmother       Social History   Substance Use Topics    Smoking status: Never Smoker    Smokeless tobacco: Current User    Alcohol use 1.2 oz/week     2 Cans of beer per week      Comment: weekly       No Known Allergies    Prior to Admission medications    Medication Sig Start Date End Date Taking?  Authorizing Provider   potassium chloride SA (MICRO-K) 10 mEq capsule TAKE 1 CAPSULE BY MOUTH ONCE DAILY 8/13/18   Elizabeth Llanos NP dexamethasone (DECADRON) 4 mg tablet Take 8 mg once po daily the day before Chemotherapy, the day of chemo and the day after chemo 6/20/18   Richa Lockett NP   famotidine (PEPCID) 40 mg tablet Take 1 Tab by mouth daily. 6/20/18   Richa Lockett NP   HYDROcodone-acetaminophen (NORCO) 5-325 mg per tablet Take 1 Tab by mouth every six (6) hours as needed for Pain. Max Daily Amount: 4 Tabs. 6/20/18   Richa Lockett NP   calcium carbonate (CALCIUM 500) 500 mg calcium (1,250 mg) chewable tablet Take 1 Tab by mouth two (2) times a day. Indications: hypocalcemia 4/19/18   Evelio Mccabe NP   prochlorperazine (COMPAZINE) 10 mg tablet Take one tab po every 6 hours with benadryl 25 mg starting the night of chemo and for three days then PRN 3/23/18   Joshua Bearded, NP   lidocaine-prilocaine (EMLA) topical cream Apply a small amount to port area one hour prior to chemo and cover with saran wrap 3/23/18   Joshua Bearded, NP   warfarin (COUMADIN) 1 mg tablet Take one tab po daily at 6 pm or after 3/23/18   Joshua Bearded, NP   ondansetron hcl (ZOFRAN, AS HYDROCHLORIDE,) 8 mg tablet Take one tab every 8 hours for nausea starting the night of chemo and for three days 3/23/18   Joshua Bearded, NP   cephALEXin (KEFLEX) 250 mg capsule Take 250 mg by mouth two (2) times a day. Historical Provider   sucralfate (CARAFATE) 1 gram tablet 1 g. Historical Provider       Review of Systems:    14 systems were reviewed. The results are as above in the HPI and otherwise negative. Objective:       Physical Exam:  GENERAL: alert, cooperative, no distress, appears stated age,   EYE: conjunctivae/corneas clear. PERRL, EOM's intact.  Fundi benign,  THROAT & NECK: normal and no erythema or exudates noted. ,    LYMPHATIC: Cervical, supraclavicular, and axillary nodes normal. ,   LUNG: clear to auscultation bilaterally,   HEART: regular rate and rhythm, S1, S2 normal, no murmur, click, rub or gallop  BREAST: The breast are symmetrical.  No dimpling, retractions, skin changes or nipple retractions are visible. No axillary of supraclavicular lymphadenopathy bilaterally. No nipple retraction or discharge bilaterally. No breast masses bilaterally. ABDOMEN: soft, non-tender. Bowel sounds normal. No masses,  no organomegaly,  EXTREMITIES:  extremities normal, atraumatic, no cyanosis or edema,   SKIN: Normal.,   NEUROLOGIC: AOx3. Gait normal. Reflexes and motor strength normal and symmetric. Cranial nerves 2-12 and sensation grossly intact. ,     Data Review: Mammography     Ms. Lionel Love has a reminder for a \"due or due soon\" health maintenance. I have asked that she contact her primary care provider for follow-up on this health maintenance. Impression:     · Patient with stage I ER negative HER-2 positive right breast cancer upper outer quadrant.     Plan:     · Needle localized lumpectomy right breast with right axillary sentinel lymph node biopsy  · Consent on chart  · Preoperative orders written    Signed By: Oliva Allison MD     October 3, 2018

## 2018-10-11 ENCOUNTER — HOSPITAL ENCOUNTER (OUTPATIENT)
Dept: INFUSION THERAPY | Age: 58
End: 2018-10-11
Payer: MEDICAID

## 2018-10-12 ENCOUNTER — HOSPITAL ENCOUNTER (OUTPATIENT)
Dept: INFUSION THERAPY | Age: 58
Discharge: HOME | End: 2018-10-12
Payer: MEDICAID

## 2018-10-12 VITALS
DIASTOLIC BLOOD PRESSURE: 72 MMHG | SYSTOLIC BLOOD PRESSURE: 117 MMHG | OXYGEN SATURATION: 100 % | RESPIRATION RATE: 18 BRPM | TEMPERATURE: 98.9 F | HEART RATE: 86 BPM

## 2018-10-12 LAB
BASO+EOS+MONOS # BLD AUTO: 0.3 K/UL (ref 0–2.3)
BASO+EOS+MONOS # BLD AUTO: 8 % (ref 0.1–17)
DIFFERENTIAL METHOD BLD: ABNORMAL
ERYTHROCYTE [DISTWIDTH] IN BLOOD BY AUTOMATED COUNT: 16.4 % (ref 11.5–14.5)
HCT VFR BLD AUTO: 28.2 % (ref 36–48)
HGB BLD-MCNC: 9.4 G/DL (ref 12–16)
LYMPHOCYTES # BLD: 1.4 K/UL (ref 1.1–5.9)
LYMPHOCYTES NFR BLD: 39 % (ref 14–44)
MCH RBC QN AUTO: 32.5 PG (ref 25–35)
MCHC RBC AUTO-ENTMCNC: 33.3 G/DL (ref 31–37)
MCV RBC AUTO: 97.6 FL (ref 78–102)
NEUTS SEG # BLD: 1.8 K/UL (ref 1.8–9.5)
NEUTS SEG NFR BLD: 54 % (ref 40–70)
PLATELET # BLD AUTO: 64 K/UL (ref 140–440)
RBC # BLD AUTO: 2.89 M/UL (ref 4.1–5.1)
WBC # BLD AUTO: 3.5 K/UL (ref 4.5–13)

## 2018-10-12 PROCEDURE — 96372 THER/PROPH/DIAG INJ SC/IM: CPT

## 2018-10-12 PROCEDURE — 36591 DRAW BLOOD OFF VENOUS DEVICE: CPT

## 2018-10-12 PROCEDURE — 74011000250 HC RX REV CODE- 250: Performed by: INTERNAL MEDICINE

## 2018-10-12 PROCEDURE — 85025 COMPLETE CBC W/AUTO DIFF WBC: CPT | Performed by: INTERNAL MEDICINE

## 2018-10-12 PROCEDURE — 36593 DECLOT VASCULAR DEVICE: CPT

## 2018-10-12 PROCEDURE — 74011250636 HC RX REV CODE- 250/636: Performed by: INTERNAL MEDICINE

## 2018-10-12 RX ORDER — SODIUM CHLORIDE 0.9 % (FLUSH) 0.9 %
10-40 SYRINGE (ML) INJECTION AS NEEDED
Status: DISCONTINUED | OUTPATIENT
Start: 2018-10-12 | End: 2018-10-16 | Stop reason: HOSPADM

## 2018-10-12 RX ORDER — HEPARIN 100 UNIT/ML
500 SYRINGE INTRAVENOUS AS NEEDED
Status: DISCONTINUED | OUTPATIENT
Start: 2018-10-12 | End: 2018-10-16 | Stop reason: HOSPADM

## 2018-10-12 RX ADMIN — ERYTHROPOIETIN 20000 UNITS: 20000 INJECTION, SOLUTION INTRAVENOUS; SUBCUTANEOUS at 13:14

## 2018-10-12 RX ADMIN — ERYTHROPOIETIN 40000 UNITS: 40000 INJECTION, SOLUTION INTRAVENOUS; SUBCUTANEOUS at 13:13

## 2018-10-12 RX ADMIN — HEPARIN 1000 UNITS: 100 SYRINGE at 13:08

## 2018-10-12 RX ADMIN — ALTEPLASE 2 MG: 2.2 INJECTION, POWDER, LYOPHILIZED, FOR SOLUTION INTRAVENOUS at 12:10

## 2018-10-12 RX ADMIN — ALTEPLASE 2 MG: 2.2 INJECTION, POWDER, LYOPHILIZED, FOR SOLUTION INTRAVENOUS at 12:09

## 2018-10-12 RX ADMIN — Medication 40 ML: at 13:08

## 2018-10-12 NOTE — PROGRESS NOTES
[facility] Progress Note Date: 2018 Name: Sharlette Mohs MRN: [id number] : 1960 Ms. Evaristo Herrmann arrived to [facility] at [phone] ambulatory. Pt is here today for procrit and port flush. Ms. Evaristo Herrmann was assessed and education was provided. No complaints or concerns voiced Ms. Ventura's vitals were reviewed. Visit Vitals  /72 (BP 1 Location: Right arm, BP Patient Position: At rest;Sitting)  Pulse 86  Temp 98.9 °F (37.2 °C)  Resp 18  SpO2 100%  Breastfeeding No  
 
 
Patient's double ports to upper left chest accessed with 1\" 20gauge hubers ( no blood return from medial or lateral port). Will declot both ports. Cath karla 2mg/2ml instilled in each port to dwell. After 60 minutes, both ports with brisk blood returns. 10ml blood discarded from lateral port and cbc obtained, 5ml blood discarded from lateral port, then each port flushed with 20 ml normal saline and 500 units heparin flush. Hubers removed. Sites without redness, swelling, infiltration or tenderness. Bandaid applied to sites. Recent Results (from the past 12 hour(s)) CBC WITH 3 PART DIFF Collection Time: 10/12/18 12:51 PM  
Result Value Ref Range WBC 3.5 (L) 4.5 - 13.0 K/uL  
 RBC 2.89 (L) 4.10 - 5.10 M/uL HGB 9.4 (L) 12.0 - 16 g/dL HCT 28.2 (L) 36 - 48 % MCV 97.6 78 - 102 FL  
 MCH 32.5 25.0 - 35.0 PG  
 MCHC 33.3 31 - 37 g/dL  
 RDW 16.4 (H) 11.5 - 14.5 % PLATELET 64 (L) 229 - 440 K/uL NEUTROPHILS 54 40 - 70 % MIXED CELLS 8 0.1 - 17 % LYMPHOCYTES 39 14 - 44 % ABS. NEUTROPHILS 1.8 1.8 - 9.5 K/UL  
 ABS. MIXED CELLS 0.3 0.0 - 2.3 K/uL  
 ABS. LYMPHOCYTES 1.4 1.1 - 5.9 K/UL  
 DF AUTOMATED Procrit injection 60,000 units given SQ to upper back of left arm. Tolerated well. Bandaid to site Reviewed discharge instructions with patient. She verbalized understanding Ms. Loera Pod was discharged from [place] [id number] in stable condition at 1320. She is to return on 11/1/18 at 1100 for her next appointment. Lesia Sutton RN October 12, 2018  
[id number]

## 2018-10-16 RX ORDER — OXYCODONE AND ACETAMINOPHEN 5; 325 MG/1; MG/1
1 TABLET ORAL
Status: ON HOLD | COMMUNITY
End: 2018-10-19 | Stop reason: SDUPTHER

## 2018-10-18 ENCOUNTER — APPOINTMENT (OUTPATIENT)
Dept: MAMMOGRAPHY | Age: 58
End: 2018-10-18
Attending: SURGERY
Payer: MEDICAID

## 2018-10-18 ENCOUNTER — HOSPITAL ENCOUNTER (OUTPATIENT)
Dept: NUCLEAR MEDICINE | Age: 58
Discharge: HOME | End: 2018-10-18
Attending: SURGERY
Payer: MEDICAID

## 2018-10-18 ENCOUNTER — ANESTHESIA EVENT (OUTPATIENT)
Dept: SURGERY | Age: 58
End: 2018-10-18
Payer: MEDICAID

## 2018-10-18 DIAGNOSIS — Z17.1 STAGE 1 BREAST CANCER, ER-, RIGHT (HCC): ICD-10-CM

## 2018-10-18 DIAGNOSIS — C50.911 STAGE 1 BREAST CANCER, ER-, RIGHT (HCC): ICD-10-CM

## 2018-10-18 PROCEDURE — 74011250636 HC RX REV CODE- 250/636

## 2018-10-18 PROCEDURE — 74011000250 HC RX REV CODE- 250

## 2018-10-18 PROCEDURE — 78195 LYMPH SYSTEM IMAGING: CPT

## 2018-10-18 RX ORDER — LIDOCAINE HYDROCHLORIDE 10 MG/ML
INJECTION, SOLUTION EPIDURAL; INFILTRATION; INTRACAUDAL; PERINEURAL
Status: COMPLETED
Start: 2018-10-18 | End: 2018-10-18

## 2018-10-18 RX ORDER — SODIUM BICARBONATE 1 MEQ/ML
SYRINGE (ML) INTRAVENOUS
Status: COMPLETED
Start: 2018-10-18 | End: 2018-10-18

## 2018-10-18 RX ADMIN — SODIUM BICARBONATE: 84 INJECTION, SOLUTION INTRAVENOUS at 10:00

## 2018-10-18 RX ADMIN — LIDOCAINE HYDROCHLORIDE: 10 INJECTION, SOLUTION EPIDURAL; INFILTRATION; INTRACAUDAL; PERINEURAL at 10:00

## 2018-10-19 ENCOUNTER — HOSPITAL ENCOUNTER (OUTPATIENT)
Age: 58
Setting detail: OUTPATIENT SURGERY
Discharge: HOME | End: 2018-10-19
Attending: SURGERY | Admitting: SURGERY
Payer: MEDICAID

## 2018-10-19 ENCOUNTER — ANESTHESIA (OUTPATIENT)
Dept: SURGERY | Age: 58
End: 2018-10-19
Payer: MEDICAID

## 2018-10-19 ENCOUNTER — APPOINTMENT (OUTPATIENT)
Dept: MAMMOGRAPHY | Age: 58
End: 2018-10-19
Attending: SURGERY
Payer: MEDICAID

## 2018-10-19 VITALS
HEART RATE: 69 BPM | SYSTOLIC BLOOD PRESSURE: 126 MMHG | OXYGEN SATURATION: 98 % | WEIGHT: 115 LBS | BODY MASS INDEX: 19.16 KG/M2 | HEIGHT: 65 IN | RESPIRATION RATE: 20 BRPM | TEMPERATURE: 97.3 F | DIASTOLIC BLOOD PRESSURE: 76 MMHG

## 2018-10-19 DIAGNOSIS — Z17.1 STAGE 1 BREAST CANCER, ER-, RIGHT (HCC): ICD-10-CM

## 2018-10-19 DIAGNOSIS — G89.18 POSTOPERATIVE PAIN: Primary | ICD-10-CM

## 2018-10-19 DIAGNOSIS — C50.911 STAGE 1 BREAST CANCER, ER-, RIGHT (HCC): ICD-10-CM

## 2018-10-19 PROCEDURE — 77030031139 HC SUT VCRL2 J&J -A: Performed by: SURGERY

## 2018-10-19 PROCEDURE — 76010000153 HC OR TIME 1.5 TO 2 HR: Performed by: SURGERY

## 2018-10-19 PROCEDURE — 74011250636 HC RX REV CODE- 250/636: Performed by: NURSE ANESTHETIST, CERTIFIED REGISTERED

## 2018-10-19 PROCEDURE — 77030011265 HC ELECTRD BLD HEX COVD -A: Performed by: SURGERY

## 2018-10-19 PROCEDURE — 74011250636 HC RX REV CODE- 250/636

## 2018-10-19 PROCEDURE — 76210000006 HC OR PH I REC 0.5 TO 1 HR: Performed by: SURGERY

## 2018-10-19 PROCEDURE — 76210000021 HC REC RM PH II 0.5 TO 1 HR: Performed by: SURGERY

## 2018-10-19 PROCEDURE — 77030018836 HC SOL IRR NACL ICUM -A: Performed by: SURGERY

## 2018-10-19 PROCEDURE — 88307 TISSUE EXAM BY PATHOLOGIST: CPT

## 2018-10-19 PROCEDURE — 77030003028 HC SUT VCRL J&J -A: Performed by: SURGERY

## 2018-10-19 PROCEDURE — 77030039266 HC ADH SKN EXOFIN S2SG -A: Performed by: SURGERY

## 2018-10-19 PROCEDURE — 88309 TISSUE EXAM BY PATHOLOGIST: CPT | Performed by: SURGERY

## 2018-10-19 PROCEDURE — 77030032490 HC SLV COMPR SCD KNE COVD -B: Performed by: SURGERY

## 2018-10-19 PROCEDURE — 74011250637 HC RX REV CODE- 250/637: Performed by: NURSE ANESTHETIST, CERTIFIED REGISTERED

## 2018-10-19 PROCEDURE — 74011250636 HC RX REV CODE- 250/636: Performed by: SURGERY

## 2018-10-19 PROCEDURE — 76060000034 HC ANESTHESIA 1.5 TO 2 HR: Performed by: SURGERY

## 2018-10-19 PROCEDURE — 77030020782 HC GWN BAIR PAWS FLX 3M -B: Performed by: SURGERY

## 2018-10-19 PROCEDURE — 77030013079 HC BLNKT BAIR HGGR 3M -A: Performed by: SURGERY

## 2018-10-19 PROCEDURE — 77030003464 MAM PLC NDL/WIRE/CLIP/SEED BREAST RT

## 2018-10-19 PROCEDURE — 88307 TISSUE EXAM BY PATHOLOGIST: CPT | Performed by: SURGERY

## 2018-10-19 PROCEDURE — 74011000250 HC RX REV CODE- 250: Performed by: SURGERY

## 2018-10-19 PROCEDURE — 77030008683 HC TU ET CUF COVD -A: Performed by: ANESTHESIOLOGY

## 2018-10-19 PROCEDURE — 77030002996 HC SUT SLK J&J -A: Performed by: SURGERY

## 2018-10-19 PROCEDURE — 88342 IMHCHEM/IMCYTCHM 1ST ANTB: CPT | Performed by: SURGERY

## 2018-10-19 RX ORDER — OXYCODONE AND ACETAMINOPHEN 5; 325 MG/1; MG/1
1 TABLET ORAL
Status: DISCONTINUED | OUTPATIENT
Start: 2018-10-19 | End: 2018-10-19 | Stop reason: HOSPADM

## 2018-10-19 RX ORDER — METHYLENE BLUE 10 MG/ML
INJECTION INTRAVENOUS AS NEEDED
Status: DISCONTINUED | OUTPATIENT
Start: 2018-10-19 | End: 2018-10-19 | Stop reason: HOSPADM

## 2018-10-19 RX ORDER — DEXAMETHASONE SODIUM PHOSPHATE 4 MG/ML
INJECTION, SOLUTION INTRA-ARTICULAR; INTRALESIONAL; INTRAMUSCULAR; INTRAVENOUS; SOFT TISSUE AS NEEDED
Status: DISCONTINUED | OUTPATIENT
Start: 2018-10-19 | End: 2018-10-19 | Stop reason: HOSPADM

## 2018-10-19 RX ORDER — MIDAZOLAM HYDROCHLORIDE 1 MG/ML
INJECTION, SOLUTION INTRAMUSCULAR; INTRAVENOUS AS NEEDED
Status: DISCONTINUED | OUTPATIENT
Start: 2018-10-19 | End: 2018-10-19 | Stop reason: HOSPADM

## 2018-10-19 RX ORDER — OXYCODONE AND ACETAMINOPHEN 5; 325 MG/1; MG/1
1 TABLET ORAL
Qty: 40 TAB | Refills: 0 | Status: ON HOLD | OUTPATIENT
Start: 2018-10-19 | End: 2019-01-16

## 2018-10-19 RX ORDER — SODIUM CHLORIDE, SODIUM LACTATE, POTASSIUM CHLORIDE, CALCIUM CHLORIDE 600; 310; 30; 20 MG/100ML; MG/100ML; MG/100ML; MG/100ML
50 INJECTION, SOLUTION INTRAVENOUS CONTINUOUS
Status: DISCONTINUED | OUTPATIENT
Start: 2018-10-19 | End: 2018-10-19 | Stop reason: HOSPADM

## 2018-10-19 RX ORDER — ONDANSETRON 2 MG/ML
4 INJECTION INTRAMUSCULAR; INTRAVENOUS ONCE
Status: DISCONTINUED | OUTPATIENT
Start: 2018-10-19 | End: 2018-10-19 | Stop reason: HOSPADM

## 2018-10-19 RX ORDER — SUCCINYLCHOLINE CHLORIDE 20 MG/ML
INJECTION INTRAMUSCULAR; INTRAVENOUS AS NEEDED
Status: DISCONTINUED | OUTPATIENT
Start: 2018-10-19 | End: 2018-10-19 | Stop reason: HOSPADM

## 2018-10-19 RX ORDER — FENTANYL CITRATE 50 UG/ML
50 INJECTION, SOLUTION INTRAMUSCULAR; INTRAVENOUS
Status: DISCONTINUED | OUTPATIENT
Start: 2018-10-19 | End: 2018-10-19 | Stop reason: HOSPADM

## 2018-10-19 RX ORDER — BUPIVACAINE HYDROCHLORIDE AND EPINEPHRINE 2.5; 5 MG/ML; UG/ML
INJECTION, SOLUTION EPIDURAL; INFILTRATION; INTRACAUDAL; PERINEURAL AS NEEDED
Status: DISCONTINUED | OUTPATIENT
Start: 2018-10-19 | End: 2018-10-19 | Stop reason: HOSPADM

## 2018-10-19 RX ORDER — DOCUSATE SODIUM 100 MG/1
100 CAPSULE, LIQUID FILLED ORAL 2 TIMES DAILY
Qty: 60 CAP | Refills: 2 | Status: ON HOLD | OUTPATIENT
Start: 2018-10-19 | End: 2019-01-16

## 2018-10-19 RX ORDER — PROPOFOL 10 MG/ML
INJECTION, EMULSION INTRAVENOUS AS NEEDED
Status: DISCONTINUED | OUTPATIENT
Start: 2018-10-19 | End: 2018-10-19 | Stop reason: HOSPADM

## 2018-10-19 RX ORDER — FENTANYL CITRATE 50 UG/ML
INJECTION, SOLUTION INTRAMUSCULAR; INTRAVENOUS AS NEEDED
Status: DISCONTINUED | OUTPATIENT
Start: 2018-10-19 | End: 2018-10-19 | Stop reason: HOSPADM

## 2018-10-19 RX ORDER — FAMOTIDINE 20 MG/1
20 TABLET, FILM COATED ORAL ONCE
Status: COMPLETED | OUTPATIENT
Start: 2018-10-19 | End: 2018-10-19

## 2018-10-19 RX ORDER — CEFAZOLIN SODIUM IN 0.9 % NACL 2 G/100 ML
PLASTIC BAG, INJECTION (ML) INTRAVENOUS AS NEEDED
Status: DISCONTINUED | OUTPATIENT
Start: 2018-10-19 | End: 2018-10-19 | Stop reason: HOSPADM

## 2018-10-19 RX ORDER — DIPHENHYDRAMINE HYDROCHLORIDE 50 MG/ML
12.5 INJECTION, SOLUTION INTRAMUSCULAR; INTRAVENOUS
Status: DISCONTINUED | OUTPATIENT
Start: 2018-10-19 | End: 2018-10-19 | Stop reason: HOSPADM

## 2018-10-19 RX ORDER — SODIUM CHLORIDE, SODIUM LACTATE, POTASSIUM CHLORIDE, CALCIUM CHLORIDE 600; 310; 30; 20 MG/100ML; MG/100ML; MG/100ML; MG/100ML
INJECTION, SOLUTION INTRAVENOUS
Status: DISCONTINUED | OUTPATIENT
Start: 2018-10-19 | End: 2018-10-19 | Stop reason: HOSPADM

## 2018-10-19 RX ORDER — HYDROMORPHONE HYDROCHLORIDE 2 MG/ML
0.5 INJECTION, SOLUTION INTRAMUSCULAR; INTRAVENOUS; SUBCUTANEOUS
Status: DISCONTINUED | OUTPATIENT
Start: 2018-10-19 | End: 2018-10-19 | Stop reason: HOSPADM

## 2018-10-19 RX ORDER — LIDOCAINE HYDROCHLORIDE 10 MG/ML
0.1 INJECTION, SOLUTION EPIDURAL; INFILTRATION; INTRACAUDAL; PERINEURAL AS NEEDED
Status: DISCONTINUED | OUTPATIENT
Start: 2018-10-19 | End: 2018-10-19 | Stop reason: HOSPADM

## 2018-10-19 RX ORDER — CEFAZOLIN SODIUM 2 G/50ML
2 SOLUTION INTRAVENOUS
Status: DISCONTINUED | OUTPATIENT
Start: 2018-10-19 | End: 2018-10-19 | Stop reason: HOSPADM

## 2018-10-19 RX ORDER — SODIUM CHLORIDE 0.9 % (FLUSH) 0.9 %
5-10 SYRINGE (ML) INJECTION AS NEEDED
Status: DISCONTINUED | OUTPATIENT
Start: 2018-10-19 | End: 2018-10-19 | Stop reason: HOSPADM

## 2018-10-19 RX ADMIN — SODIUM CHLORIDE, SODIUM LACTATE, POTASSIUM CHLORIDE, CALCIUM CHLORIDE: 600; 310; 30; 20 INJECTION, SOLUTION INTRAVENOUS at 10:59

## 2018-10-19 RX ADMIN — Medication 2 G: at 11:22

## 2018-10-19 RX ADMIN — SUCCINYLCHOLINE CHLORIDE 100 MG: 20 INJECTION INTRAMUSCULAR; INTRAVENOUS at 11:08

## 2018-10-19 RX ADMIN — PROPOFOL 130 MG: 10 INJECTION, EMULSION INTRAVENOUS at 11:08

## 2018-10-19 RX ADMIN — SODIUM CHLORIDE, SODIUM LACTATE, POTASSIUM CHLORIDE, AND CALCIUM CHLORIDE 50 ML/HR: 600; 310; 30; 20 INJECTION, SOLUTION INTRAVENOUS at 10:48

## 2018-10-19 RX ADMIN — DEXAMETHASONE SODIUM PHOSPHATE 4 MG: 4 INJECTION, SOLUTION INTRA-ARTICULAR; INTRALESIONAL; INTRAMUSCULAR; INTRAVENOUS; SOFT TISSUE at 11:19

## 2018-10-19 RX ADMIN — FENTANYL CITRATE 50 MCG: 50 INJECTION INTRAMUSCULAR; INTRAVENOUS at 13:25

## 2018-10-19 RX ADMIN — FENTANYL CITRATE 50 MCG: 50 INJECTION, SOLUTION INTRAMUSCULAR; INTRAVENOUS at 11:51

## 2018-10-19 RX ADMIN — HYDROMORPHONE HYDROCHLORIDE 0.5 MG: 2 INJECTION INTRAMUSCULAR; INTRAVENOUS; SUBCUTANEOUS at 13:29

## 2018-10-19 RX ADMIN — FAMOTIDINE 20 MG: 20 TABLET, FILM COATED ORAL at 10:48

## 2018-10-19 RX ADMIN — FENTANYL CITRATE 50 MCG: 50 INJECTION, SOLUTION INTRAMUSCULAR; INTRAVENOUS at 11:16

## 2018-10-19 RX ADMIN — MIDAZOLAM HYDROCHLORIDE 2 MG: 1 INJECTION, SOLUTION INTRAMUSCULAR; INTRAVENOUS at 10:59

## 2018-10-19 RX ADMIN — PROPOFOL 30 MG: 10 INJECTION, EMULSION INTRAVENOUS at 11:31

## 2018-10-19 NOTE — INTERVAL H&P NOTE
H&P Update:  Ahmet Hernandez was seen and examined. History and physical has been reviewed. The patient has been examined.  There have been no significant clinical changes since the completion of the originally dated History and Physical.    Signed By: Maci Roberts MD     October 19, 2018 10:29 AM

## 2018-10-19 NOTE — DISCHARGE SUMMARY
4 Orquidea Zapien MD, [address]  General Surgery  Discharge Summary     Patient ID:  Randi Crawford  [id number]  62 y.o.  1960    Admit Date: 10/19/2018    Discharge Date: 10/19/2018    Admission Diagnoses: Stage 1 breast cancer, ER-, right ([facility] [C50.911, Z17.1]    Discharge Diagnoses:    Problem List as of 10/19/2018 Date Reviewed: 10/19/2018          Codes Class Noted - Resolved    Hypokalemia ICD-10-CM: E87.6  ICD-9-CM: 276.8  4/19/2018 - Present        Antineoplastic chemotherapy induced anemia ICD-10-CM: D64.81, T45.1X5A  ICD-9-CM: 285.3, E933.1  4/18/2018 - Present        Fatigue due to treatment ICD-10-CM: R53.83  ICD-9-CM: 780.79  4/18/2018 - Present        Breast cancer, right [facility]) ICD-10-CM: C50.911  ICD-9-CM: 174.9  3/8/2018 - Present        Malignant neoplasm of upper-outer quadrant of right breast in female, estrogen receptor negative ([facility] ICD-10-CM: C50.411, Z17.1  ICD-9-CM: 174.4, V86.1  2/14/2018 - Present        Viral hepatitis B with hepatic coma ICD-10-CM: B19.11  ICD-9-CM: 070.20  2/14/2018 - Present        GERD (gastroesophageal reflux disease) ICD-10-CM: K21.9  ICD-9-CM: 530.81  9/5/2014 - Present               Admission Condition: Good    Discharge Condition: Good    Last Procedure: Procedure(s):  RIGHT BREAST NEEDLE LOCALIZED LUMPECTOMY AND SENTINEL LYMPH NODE (LOC 0830; NUC 10.18.18 @ 0900)    Hospital Course:   Normal hospital course for this procedure. Consults: None    Significant Diagnostic Studies: None    Disposition: home    Patient Instructions:   Current Discharge Medication List      CONTINUE these medications which have NOT CHANGED    Details   oxyCODONE-acetaminophen (PERCOCET) 5-325 mg per tablet Take 1 Tab by mouth every six (6) hours as needed for Pain. Activity: See surgical instructions  Diet: Low fat, Low cholesterol  Wound Care: As directed    Follow-up with Dr. Austen Johnson in 1 week.   Follow-up tests/labs None    Signed:  Carol Goel MD  10/19/2018  11:19 AM

## 2018-10-19 NOTE — ANESTHESIA POSTPROCEDURE EVALUATION
Procedure(s): RIGHT BREAST NEEDLE LOCALIZED LUMPECTOMY AND SENTINEL LYMPH NODE (LOC 0830; NUC 10.18.18 @ 0900). Anesthesia Post Evaluation Multimodal analgesia: multimodal analgesia used between 6 hours prior to anesthesia start to PACU discharge Patient location during evaluation: PACU Patient participation: complete - patient participated Level of consciousness: awake and alert Pain score: 0 Pain management: adequate Airway patency: patent Anesthetic complications: no 
Cardiovascular status: stable Respiratory status: spontaneous ventilation and room air Hydration status: euvolemic Visit Vitals /73 Pulse 90 Temp 36.3 °C (97.4 °F) Resp 12 Ht 5' 5\" (1.651 m) Wt 52.2 kg (115 lb) SpO2 99% BMI 19.14 kg/m²

## 2018-10-19 NOTE — ANESTHESIA PREPROCEDURE EVALUATION
Anesthetic History No history of anesthetic complications Review of Systems / Medical History Patient summary reviewed, nursing notes reviewed and pertinent labs reviewed Pulmonary Within defined limits Neuro/Psych Within defined limits Cardiovascular Exercise tolerance: >4 METS 
  
GI/Hepatic/Renal 
  
GERD: well controlled Hepatitis: type B Liver disease Endo/Other Anemia Other Findings Physical Exam 
 
Airway Mallampati: II 
TM Distance: 4 - 6 cm Neck ROM: normal range of motion Mouth opening: Normal 
 
 Cardiovascular Regular rate and rhythm,  S1 and S2 normal,  no murmur, click, rub, or gallop Rhythm: regular Rate: normal 
 
 
 
 Dental 
 
Dentition: Poor dentition Comments: Very bad teeth. Many are loose. Pulmonary Breath sounds clear to auscultation Abdominal 
GI exam deferred Other Findings Anesthetic Plan ASA: 3 Anesthesia type: general 
 
 
 
 
Induction: Intravenous Anesthetic plan and risks discussed with: Patient

## 2018-10-19 NOTE — OP NOTES
[place] [address] [address] [place]                                 OPERATIVE REPORT    PATIENT:    Earl Monroy  MRN:            [id number]      DATE:  10/19/2018  BILLIN  ROOM:        @BED@  ATTENDING:   Estuardo Gan MD  DICTATING:   Estuardo Gan MD      PREOPERATIVE DIAGNOSIS: right breast cancer     POSTOPERATIVE DIAGNOSIS: Same    PROCEDURES PERFORMED: Needle localized lumpectomy right breast with sentinel lymph node biopsy. SURGEON: Kishan Baez MD    ASSISTANT: Maggie Oh SA    ANESTHESIA: LMA general and local (.25 % marcaine plain). SPECIMENS REMOVED: right breast tissue and sentinel nodes. FINDINGS: Specimen mammography is confirmative. ESTIMATED BLOOD LOSS: 15 mL. FLUIDS GIVEN: 700 mL. DESCRIPTION OF PROCEDURE: The patient was identified in the holding area  with family where consent for needle localized lumpectomy of right  breast with right sentinel lymph node biopsy was verified. In the operating room, the patient was positioned supine on the OR table. She did receive perioperative antibiotics. LMA general anesthesia was induced. The patient's left breast and axilla were prepped and draped in sterile fashion using chlorhexidine solution and sterile drapes. The lymphazurin blue was injected intradermally around the areolar in 4 corners. The time-out was performed to ensure correct procedure. The local  anesthetic was infiltrated into the skin and deep dermal tissues at the base of the hair bearing skin. The 15 blade was used to create an incision through skin into the subcutaneous tissue. The pectoral fascia was opened to enter the axilla  The Neoprobe and the blue dye were used to identify the sentinel node. The Harmonic Focus was used to excise the nodes. The nodes were passed off the table to be sent to pathology.        The skin at the base of the wire was injected with the local anesthetic. The #15 blade was used to dissect through the skin and subcutaneous tissue. The electrocautery was used to dissect down approximately 0.5 cm into the breast tissue. The   wire were identified and grasped with an Allis. Circumferential dissection of the  specimen at the distal 5-6 cm of the wire was performed. During the dissection of the mass in the area of scar tissue near the skin was encountered and a inadvertent injury to the skin Sam incurred. This injury was repaired using interrupted stitches of 4-0 Monocryl. It was approximately 1-1/2-2 cm in length. The specimen was  marked with the a long stitch lateral, short stitch superior and a double  stitch posterior. The specimen was sent to radiology where specimen mammography was performed  which confirms the area of interest is in the specimen. Additional local  anesthetic was infiltrated into the cavity. Electrocautery was used to  control bleeding within the cavities. Both incisions were irrigated with warm saline. The 3-0 Vicryl suture in interrupted stitches were used to reapproximate the deep dermal tissues. 4-0 Monocryl  suture was used to reapproximate the skin in a running subcuticular  closure. Dermaflex was used as a wound sealant. The patient tolerated the  procedure very well. DISPOSITION: She was stable upon transport to the recovery room.

## 2018-10-26 ENCOUNTER — OFFICE VISIT (OUTPATIENT)
Dept: SURGERY | Age: 58
End: 2018-10-26

## 2018-10-26 VITALS
SYSTOLIC BLOOD PRESSURE: 118 MMHG | HEART RATE: 68 BPM | RESPIRATION RATE: 18 BRPM | WEIGHT: 118 LBS | BODY MASS INDEX: 19.64 KG/M2 | DIASTOLIC BLOOD PRESSURE: 84 MMHG | TEMPERATURE: 97.8 F

## 2018-10-26 DIAGNOSIS — Z17.1 STAGE 1 BREAST CANCER, ER-, RIGHT (HCC): ICD-10-CM

## 2018-10-26 DIAGNOSIS — Z09 POSTOPERATIVE EXAMINATION: Primary | ICD-10-CM

## 2018-10-26 DIAGNOSIS — C50.911 STAGE 1 BREAST CANCER, ER-, RIGHT (HCC): ICD-10-CM

## 2018-10-26 NOTE — PATIENT INSTRUCTIONS
Breast Cancer: Care Instructions  Your Care Instructions    Breast cancer occurs when abnormal cells grow out of control in the breast. These cancer cells can spread within the breast, to nearby lymph nodes and other tissues, and to other parts of the body. Being treated for cancer can weaken your body, and you may feel very tired. Get the rest your body needs so you can feel better. Finding out that you have cancer is scary. You may feel many emotions and may need some help coping. Seek out family, friends, and counselors for support. You also can do things at home to make yourself feel better while you go through treatment. Call the [employer] (1-471.725.9010) or visit its website at [employer] for more information. Follow-up care is a key part of your treatment and safety. Be sure to make and go to all appointments, and call your doctor if you are having problems. It's also a good idea to know your test results and keep a list of the medicines you take. How can you care for yourself at home? · Take your medicines exactly as prescribed. Call your doctor if you think you are having a problem with your medicine. You may get medicine for nausea and vomiting if you have these side effects. · Follow your doctor's instructions to relieve pain. Pain from cancer and surgery can almost always be controlled. Use pain medicine when you first notice pain, before it becomes severe. · Eat healthy food. If you do not feel like eating, try to eat food that has protein and extra calories to keep up your strength and prevent weight loss. Drink liquid meal replacements for extra calories and protein. Try to eat your main meal early. · Get some physical activity every day, but do not get too tired. Keep doing the hobbies you enjoy as your energy allows. · Do not smoke. Smoking can make your cancer worse. If you need help quitting, talk to your doctor about stop-smoking programs and medicines.  These can increase your chances of quitting for good. · Take steps to control your stress and workload. Learn relaxation techniques. ? Share your feelings. Stress and tension affect our emotions. By expressing your feelings to others, you may be able to understand and cope with them. ? Consider joining a support group. Talking about a problem with your spouse, a good friend, or other people with similar problems is a good way to reduce tension and stress. ? Express yourself through art. Try writing, crafts, dance, or art to relieve stress. Some dance, writing, or art groups may be available just for people who have cancer. ? Be kind to your body and mind. Getting enough sleep, eating a healthy diet, and taking time to do things you enjoy can contribute to an overall feeling of balance in your life and can help reduce stress. ? Get help if you need it. Discuss your concerns with your doctor or counselor. · If you are vomiting or have diarrhea:  ? Drink plenty of fluids (enough so that your urine is light yellow or clear like water) to prevent dehydration. Choose water and other caffeine-free clear liquids. If you have kidney, heart, or liver disease and have to limit fluids, talk with your doctor before you increase the amount of fluids you drink. ? When you are able to eat, try clear soups, mild foods, and liquids until all symptoms are gone for 12 to 48 hours. Other good choices include dry toast, crackers, cooked cereal, and gelatin dessert, such as Jell-O.  · If you have not already done so, prepare a list of advance directives. Advance directives are instructions to your doctor and family members about what kind of care you want if you become unable to speak or express yourself. When should you call for help? Call 911 anytime you think you may need emergency care.  For example, call if:    · You passed out (lost consciousness).    Call your doctor now or seek immediate medical care if:    · You have a fever.     · You have abnormal bleeding.     · You think you have an infection.     · You have new or worse pain.     · You have new symptoms, such as a cough, belly pain, vomiting, diarrhea, or a rash.    Watch closely for changes in your health, and be sure to contact your doctor if:    · You are much more tired than usual.     · You have swollen glands in your armpits, groin, or neck.     · You do not get better as expected. Where can you learn more? Go to http://catalino-tiesha.info/. Enter V321 in the search box to learn more about \"Breast Cancer: Care Instructions. \"  Current as of: March 28, 2018  Content Version: 11.8  © 6096-8489 [employer]. Care instructions adapted under license by [employer] (which disclaims liability or warranty for this information). If you have questions about a medical condition or this instruction, always ask your healthcare professional. [place] [address] any warranty or liability for your use of this information. Breast Cancer: Care Instructions  Your Care Instructions    Breast cancer occurs when abnormal cells grow out of control in the breast. These cancer cells can spread within the breast, to nearby lymph nodes and other tissues, and to other parts of the body. Being treated for cancer can weaken your body, and you may feel very tired. Get the rest your body needs so you can feel better. Finding out that you have cancer is scary. You may feel many emotions and may need some help coping. Seek out family, friends, and counselors for support. You also can do things at home to make yourself feel better while you go through treatment. Call the [facility] Charleen Wilson (6-233.569.5709) or visit its website at [employer] [employer] for more information. Follow-up care is a key part of your treatment and safety. Be sure to make and go to all appointments, and call your doctor if you are having problems.  It's also a good idea to know your test results and keep a list of the medicines you take. How can you care for yourself at home? · Take your medicines exactly as prescribed. Call your doctor if you think you are having a problem with your medicine. You may get medicine for nausea and vomiting if you have these side effects. · Follow your doctor's instructions to relieve pain. Pain from cancer and surgery can almost always be controlled. Use pain medicine when you first notice pain, before it becomes severe. · Eat healthy food. If you do not feel like eating, try to eat food that has protein and extra calories to keep up your strength and prevent weight loss. Drink liquid meal replacements for extra calories and protein. Try to eat your main meal early. · Get some physical activity every day, but do not get too tired. Keep doing the hobbies you enjoy as your energy allows. · Do not smoke. Smoking can make your cancer worse. If you need help quitting, talk to your doctor about stop-smoking programs and medicines. These can increase your chances of quitting for good. · Take steps to control your stress and workload. Learn relaxation techniques. ? Share your feelings. Stress and tension affect our emotions. By expressing your feelings to others, you may be able to understand and cope with them. ? Consider joining a support group. Talking about a problem with your spouse, a good friend, or other people with similar problems is a good way to reduce tension and stress. ? Express yourself through art. Try writing, crafts, dance, or art to relieve stress. Some dance, writing, or art groups may be available just for people who have cancer. ? Be kind to your body and mind. Getting enough sleep, eating a healthy diet, and taking time to do things you enjoy can contribute to an overall feeling of balance in your life and can help reduce stress. ? Get help if you need it. Discuss your concerns with your doctor or counselor.   · If you are vomiting or have diarrhea:  ? Drink plenty of fluids (enough so that your urine is light yellow or clear like water) to prevent dehydration. Choose water and other caffeine-free clear liquids. If you have kidney, heart, or liver disease and have to limit fluids, talk with your doctor before you increase the amount of fluids you drink. ? When you are able to eat, try clear soups, mild foods, and liquids until all symptoms are gone for 12 to 48 hours. Other good choices include dry toast, crackers, cooked cereal, and gelatin dessert, such as Jell-O.  · If you have not already done so, prepare a list of advance directives. Advance directives are instructions to your doctor and family members about what kind of care you want if you become unable to speak or express yourself. When should you call for help? Call 911 anytime you think you may need emergency care. For example, call if:    · You passed out (lost consciousness).    Call your doctor now or seek immediate medical care if:    · You have a fever.     · You have abnormal bleeding.     · You think you have an infection.     · You have new or worse pain.     · You have new symptoms, such as a cough, belly pain, vomiting, diarrhea, or a rash.    Watch closely for changes in your health, and be sure to contact your doctor if:    · You are much more tired than usual.     · You have swollen glands in your armpits, groin, or neck.     · You do not get better as expected. Where can you learn more? Go to http://catalino-tiesha.info/. Enter V321 in the search box to learn more about \"Breast Cancer: Care Instructions. \"  Current as of: March 28, 2018  Content Version: 11.8  © 9073-3601 [employer] Care instructions adapted under license by [employer] (which disclaims liability or warranty for this information).  If you have questions about a medical condition or this instruction, always ask your healthcare professional. [employer] disclaims any warranty or liability for your use of this information.

## 2018-10-26 NOTE — PROGRESS NOTES
Virgil Longoria. Evelio Mccabe, MASOUDP-C  PROGRESS NOTE    Subjective:    Ms. Aisha Saleem presents today 1 week out from right breast, needle localized lumpectomy and sentinel lymph node biopsy. Pathology reveals no residual carcinoma, negative margins, and (0/1) lymph nodes positive for metastatic spread. Surgically, she is doing well. She her tenderness. She denies fevers or chills. She feels that she is doing very well since surgery. She understands that the glue will come off naturally and she can shower as normal.    She did state that she fell getting into a car after surgery and broke her right wrist.  She currently has no brace but states that she does have one that she wears all the time. She continues to follow with Dr. Asif Huizar whom she last saw in September. She states she has an upcoming appointment in another week or 2 with him to continue her chemotherapy. She has no questions about her treatment plan and understands that we will follow her every few months for the first year. Objective:    Vitals:    10/26/18 1535   BP: 118/84   Pulse: 68   Resp: 18   Temp: 97.8 °F (36.6 °C)   Weight: 53.5 kg (118 lb)       Physical Exam:    General: Alert and oriented x 3, cooperative, in no apparent distress   Breasts:    Left: Deferred   Right: Incision to outer quadrant clean, dry, intact. No edema, no erythema, no seroma, no drainage. Dermabond glue still intact. Current Medications:  Current Outpatient Medications   Medication Sig Dispense Refill    docusate sodium (COLACE) 100 mg capsule Take 1 Cap by mouth two (2) times a day for 90 days. 60 Cap 2    oxyCODONE-acetaminophen (PERCOCET) 5-325 mg per tablet Take 1 Tab by mouth every six (6) hours as needed for Pain. Max Daily Amount: 4 Tabs. 40 Tab 0       Chart and notes reviewed. Data reviewed. I have evaluated and examined the patient.         Impression:  · Patient with personal history of HER-2 positive right breast invasive ductal carcinoma status post neoadjuvant chemotherapy and now lumpectomy and sentinel lymph node biopsy doing well and eager to complete her chemotherapy. Plan:   · Continue normal skin hygiene and allow surgical glue to wear off naturally  · And follow-up with Dr. Kang Haynes in 3 months or sooner if needed  · Please call with any questions or concerns prior to next appointment    Ms. Lionel Love has a reminder for a \"due or due soon\" health maintenance. I have asked that she contact her primary care provider for follow-up on this health maintenance. Trcay Rosales.  Noel Mueller, FNP-C

## 2018-11-01 ENCOUNTER — HOSPITAL ENCOUNTER (OUTPATIENT)
Dept: INFUSION THERAPY | Age: 58
Discharge: HOME | End: 2018-11-01
Payer: MEDICAID

## 2018-11-01 VITALS
TEMPERATURE: 97.8 F | DIASTOLIC BLOOD PRESSURE: 59 MMHG | RESPIRATION RATE: 18 BRPM | HEART RATE: 97 BPM | SYSTOLIC BLOOD PRESSURE: 100 MMHG

## 2018-11-01 DIAGNOSIS — Z17.1 STAGE 1 BREAST CANCER, ER-, RIGHT (HCC): ICD-10-CM

## 2018-11-01 DIAGNOSIS — C50.911 STAGE 1 BREAST CANCER, ER-, RIGHT (HCC): ICD-10-CM

## 2018-11-01 LAB
BASO+EOS+MONOS # BLD AUTO: 0.3 K/UL (ref 0–2.3)
BASO+EOS+MONOS # BLD AUTO: 10 % (ref 0.1–17)
DIFFERENTIAL METHOD BLD: ABNORMAL
ERYTHROCYTE [DISTWIDTH] IN BLOOD BY AUTOMATED COUNT: 17.1 % (ref 11.5–14.5)
HCT VFR BLD AUTO: 33.1 % (ref 36–48)
HGB BLD-MCNC: 10.9 G/DL (ref 12–16)
LYMPHOCYTES # BLD: 1.6 K/UL (ref 1.1–5.9)
LYMPHOCYTES NFR BLD: 47 % (ref 14–44)
MCH RBC QN AUTO: 32.9 PG (ref 25–35)
MCHC RBC AUTO-ENTMCNC: 32.9 G/DL (ref 31–37)
MCV RBC AUTO: 100 FL (ref 78–102)
NEUTS SEG # BLD: 1.4 K/UL (ref 1.8–9.5)
NEUTS SEG NFR BLD: 43 % (ref 40–70)
PLATELET # BLD AUTO: 223 K/UL (ref 140–440)
RBC # BLD AUTO: 3.31 M/UL (ref 4.1–5.1)
WBC # BLD AUTO: 3.3 K/UL (ref 4.5–13)

## 2018-11-01 PROCEDURE — 85025 COMPLETE CBC W/AUTO DIFF WBC: CPT | Performed by: INTERNAL MEDICINE

## 2018-11-01 PROCEDURE — 36415 COLL VENOUS BLD VENIPUNCTURE: CPT

## 2018-11-01 NOTE — PROGRESS NOTES
[facility] Progress Note Date: 2018 Name: Zuleika Room MRN: [id number] : 1960 Procrit Q3 weeks Ms. Anabel Chicas to [facility], ambulatory at 1440. Pt was assessed and education was provided. Ms. Ventura's vitals were reviewed and patient was observed for 5 minutes prior to treatment. Visit Vitals /59 (BP 1 Location: Left arm, BP Patient Position: Sitting) Pulse 97 Temp 97.8 °F (36.6 °C) Resp 18 Breastfeeding? No  
 
 
Blood obtained peripherally from the left [facility] without difficulty and sent to lab for CBC per written orders. No bleeding or hematoma noted at site. Guaze and coban applied. Lab results were obtained and reviewed. Recent Results (from the past 12 hour(s)) CBC WITH 3 PART DIFF Collection Time: 18  2:50 PM  
Result Value Ref Range WBC 3.3 (L) 4.5 - 13.0 K/uL  
 RBC 3.31 (L) 4.10 - 5.10 M/uL  
 HGB 10.9 (L) 12.0 - 16 g/dL HCT 33.1 (L) 36 - 48 % .0 78 - 102 FL  
 MCH 32.9 25.0 - 35.0 PG  
 MCHC 32.9 31 - 37 g/dL  
 RDW 17.1 (H) 11.5 - 14.5 % PLATELET 106 601 - 148 K/uL NEUTROPHILS 43 40 - 70 % MIXED CELLS 10 0.1 - 17 % LYMPHOCYTES 47 (H) 14 - 44 % ABS. NEUTROPHILS 1.4 (L) 1.8 - 9.5 K/UL  
 ABS. MIXED CELLS 0.3 0.0 - 2.3 K/uL  
 ABS. LYMPHOCYTES 1.6 1.1 - 5.9 K/UL  
 DF AUTOMATED Results within ordered parameters to HOLD procrit today. Ms. Anabel Chicas tolerated well, and had no complaints. Patient armband removed and shredded. Ms. Anabel Chicas was discharged from [place] [address] in stable condition at 1500. She is to return on 18 at 1430 for her next appointment. Rosaura Clemente RN 2018

## 2018-11-03 DIAGNOSIS — E87.6 HYPOKALEMIA: ICD-10-CM

## 2018-11-08 RX ORDER — POTASSIUM CHLORIDE 750 MG/1
CAPSULE, EXTENDED RELEASE ORAL
Qty: 30 CAP | Refills: 0 | OUTPATIENT
Start: 2018-11-08

## 2018-11-12 ENCOUNTER — OFFICE VISIT (OUTPATIENT)
Dept: ONCOLOGY | Age: 58
End: 2018-11-12

## 2018-11-12 ENCOUNTER — HOSPITAL ENCOUNTER (OUTPATIENT)
Dept: LAB | Age: 58
Discharge: HOME | End: 2018-11-12
Payer: MEDICAID

## 2018-11-12 ENCOUNTER — HOSPITAL ENCOUNTER (OUTPATIENT)
Dept: ONCOLOGY | Age: 58
Discharge: HOME | End: 2018-11-12

## 2018-11-12 VITALS
OXYGEN SATURATION: 99 % | DIASTOLIC BLOOD PRESSURE: 68 MMHG | SYSTOLIC BLOOD PRESSURE: 108 MMHG | BODY MASS INDEX: 20.16 KG/M2 | HEIGHT: 65 IN | RESPIRATION RATE: 18 BRPM | TEMPERATURE: 98.2 F | WEIGHT: 121 LBS | HEART RATE: 68 BPM

## 2018-11-12 DIAGNOSIS — T45.1X5A ANTINEOPLASTIC CHEMOTHERAPY INDUCED ANEMIA: ICD-10-CM

## 2018-11-12 DIAGNOSIS — C50.411 MALIGNANT NEOPLASM OF UPPER-OUTER QUADRANT OF RIGHT BREAST IN FEMALE, ESTROGEN RECEPTOR NEGATIVE (HCC): Primary | ICD-10-CM

## 2018-11-12 DIAGNOSIS — D64.81 ANTINEOPLASTIC CHEMOTHERAPY INDUCED ANEMIA: ICD-10-CM

## 2018-11-12 DIAGNOSIS — Z17.1 MALIGNANT NEOPLASM OF UPPER-OUTER QUADRANT OF RIGHT BREAST IN FEMALE, ESTROGEN RECEPTOR NEGATIVE (HCC): ICD-10-CM

## 2018-11-12 DIAGNOSIS — Z17.1 MALIGNANT NEOPLASM OF UPPER-OUTER QUADRANT OF RIGHT BREAST IN FEMALE, ESTROGEN RECEPTOR NEGATIVE (HCC): Primary | ICD-10-CM

## 2018-11-12 DIAGNOSIS — C50.411 MALIGNANT NEOPLASM OF UPPER-OUTER QUADRANT OF RIGHT BREAST IN FEMALE, ESTROGEN RECEPTOR NEGATIVE (HCC): ICD-10-CM

## 2018-11-12 DIAGNOSIS — R53.83 FATIGUE DUE TO TREATMENT: ICD-10-CM

## 2018-11-12 LAB
ALBUMIN SERPL-MCNC: 3.1 G/DL (ref 3.4–5)
ALBUMIN/GLOB SERPL: 0.8 {RATIO} (ref 0.8–1.7)
ALP SERPL-CCNC: 99 U/L (ref 45–117)
ALT SERPL-CCNC: 18 U/L (ref 13–56)
ANION GAP SERPL CALC-SCNC: 6 MMOL/L (ref 3–18)
AST SERPL-CCNC: 18 U/L (ref 15–37)
BASO+EOS+MONOS # BLD AUTO: 0.4 K/UL (ref 0–2.3)
BASO+EOS+MONOS # BLD AUTO: 9 % (ref 0.1–17)
BILIRUB SERPL-MCNC: 0.2 MG/DL (ref 0.2–1)
BUN SERPL-MCNC: 12 MG/DL (ref 7–18)
BUN/CREAT SERPL: 14 (ref 12–20)
CALCIUM SERPL-MCNC: 8.4 MG/DL (ref 8.5–10.1)
CHLORIDE SERPL-SCNC: 109 MMOL/L (ref 100–108)
CO2 SERPL-SCNC: 24 MMOL/L (ref 21–32)
CREAT SERPL-MCNC: 0.83 MG/DL (ref 0.6–1.3)
DIFFERENTIAL METHOD BLD: ABNORMAL
ERYTHROCYTE [DISTWIDTH] IN BLOOD BY AUTOMATED COUNT: 15.9 % (ref 11.5–14.5)
FERRITIN SERPL-MCNC: 50 NG/ML (ref 8–388)
GLOBULIN SER CALC-MCNC: 3.7 G/DL (ref 2–4)
GLUCOSE SERPL-MCNC: 82 MG/DL (ref 74–99)
HCT VFR BLD AUTO: 33.9 % (ref 36–48)
HGB BLD-MCNC: 11.1 G/DL (ref 12–16)
IRON SATN MFR SERPL: 29 %
IRON SERPL-MCNC: 75 UG/DL (ref 50–175)
LYMPHOCYTES # BLD: 1.7 K/UL (ref 1.1–5.9)
LYMPHOCYTES NFR BLD: 43 % (ref 14–44)
MCH RBC QN AUTO: 32.4 PG (ref 25–35)
MCHC RBC AUTO-ENTMCNC: 32.7 G/DL (ref 31–37)
MCV RBC AUTO: 98.8 FL (ref 78–102)
NEUTS SEG # BLD: 1.8 K/UL (ref 1.8–9.5)
NEUTS SEG NFR BLD: 47 % (ref 40–70)
PLATELET # BLD AUTO: 197 K/UL (ref 140–440)
POTASSIUM SERPL-SCNC: 4.3 MMOL/L (ref 3.5–5.5)
PROT SERPL-MCNC: 6.8 G/DL (ref 6.4–8.2)
RBC # BLD AUTO: 3.43 M/UL (ref 4.1–5.1)
SODIUM SERPL-SCNC: 139 MMOL/L (ref 136–145)
TIBC SERPL-MCNC: 259 UG/DL (ref 250–450)
WBC # BLD AUTO: 3.9 K/UL (ref 4.5–13)

## 2018-11-12 PROCEDURE — 82728 ASSAY OF FERRITIN: CPT

## 2018-11-12 PROCEDURE — 80053 COMPREHEN METABOLIC PANEL: CPT

## 2018-11-12 PROCEDURE — 36415 COLL VENOUS BLD VENIPUNCTURE: CPT

## 2018-11-12 PROCEDURE — 86300 IMMUNOASSAY TUMOR CA 15-3: CPT

## 2018-11-12 PROCEDURE — 83540 ASSAY OF IRON: CPT

## 2018-11-12 NOTE — PATIENT INSTRUCTIONS

## 2018-11-12 NOTE — PROGRESS NOTES
Hematology/Oncology  Progress Note    Name: Jayda Posada  Date: 2018  : 1960    PCP: Linda Ware MD     Ms. Irish Greer is a 62 y.o. -American woman with HER-2 positive invasive ductal adenocarcinoma the right breast    Current therapy: Carboplatin, Taxotere, and Herceptin plus Perjeda. The patient completed 6 cycles of adjuvant therapy and is now completed her final surgical procedure. Subjective:     Ms. Irish Greer is a 66-year-old -American woman who is currently receiving neoadjuvant systemic therapy for her HER-2 positive invasive ductal adenocarcinoma the right breast.  On 9/10/2018 she completed cycle 6 of her treatment. The patient reports that she tolerated the medication well. She did not experience any significant nausea or emesis. She has experienced a mild degree of fatigue. Today, she has no new or additional complaints to report. The patient has subsequently undergone surgery and informed me that there was no residual tumor at the surgical site. Past medical history, family history, and social history: these were reviewed and remains unchanged.     Past Medical History:   Diagnosis Date    Cancer [facility])     right breast    Hepatitis B     S/P chemotherapy, time since 4-12 weeks     Tuberculosis     Wrist fracture, right 10/13/2018     Past Surgical History:   Procedure Laterality Date    BX/REMV,LYMPH NODE,DEEP AXILL Right 10/19/2018    Dr. Mcdaniels Severe HX GYN      hysterectomy     HX OTHER SURGICAL      neck surgery     SD INSJ PRPH CTR VAD W/SUBQ PORT AGE 5 YR/> N/A 2018    Dr. Sofy Casillas History     Socioeconomic History    Marital status: SINGLE     Spouse name: Not on file    Number of children: Not on file    Years of education: Not on file    Highest education level: Not on file   Social Needs    Financial resource strain: Not on file    Food insecurity - worry: Not on file    Food insecurity - inability: Not on file   García Davis Transportation needs - medical: Not on file   ID Watchdog needs - non-medical: Not on file   Occupational History    Not on file   Tobacco Use    Smoking status: Never Smoker    Smokeless tobacco: Current User   Substance and Sexual Activity    Alcohol use: No     Alcohol/week: 1.2 oz     Types: 2 Cans of beer per week     Comment: weekly     Drug use: No    Sexual activity: No   Other Topics Concern    Not on file   Social History Narrative    Not on file     Family History   Problem Relation Age of Onset    Stroke Mother     Breast Cancer Mother     Heart Disease Father     Cancer Maternal Grandmother      Current Outpatient Medications   Medication Sig Dispense Refill    docusate sodium (COLACE) 100 mg capsule Take 1 Cap by mouth two (2) times a day for 90 days. 60 Cap 2    oxyCODONE-acetaminophen (PERCOCET) 5-325 mg per tablet Take 1 Tab by mouth every six (6) hours as needed for Pain. Max Daily Amount: 4 Tabs. 40 Tab 0       Review of Systems  Constitutional: The patient has complaints of a mild degree of fatigue and some weakness following chemotherapy. She has a noticeable increase in her food intake due to increasing appetite following chemotherapy. HEENT: The patient denies recent head trauma, eye pain, blurred vision,  hearing deficit, oropharyngeal mucosal pain or lesions, and the patient denies throat pain or discomfort. Lymphatics: The patient denies palpable peripheral lymphadenopathy. Hematologic: The patient denies having bruising, bleeding, or progressive fatigue. Respiratory: Patient denies having shortness of breath, cough, sputum production, fever, or dyspnea on exertion. Cardiovascular: The patient denies having leg pain, leg swelling, heart palpitations, chest permit, chest pain, or lightheadedness. The patient denies having dyspnea on exertion. Gastrointestinal: The patient denies having nausea, emesis, or diarrhea.  The patient denies having any hematemesis or blood in the stool. Genitourinary: Patient denies having urinary urgency, frequency, or dysuria. The patient denies having blood in the urine. Psychological: The patient denies having symptoms of nervousness, anxiety, depression, or thoughts of harming self. Skin: Patient denies having skin rashes, skin, ulcerations, or unexplained itching or pruritus. Musculoskeletal: The patient denies having pain in the joints or bones. Objective:     Visit Vitals  /68   Pulse 68   Temp 98.2 °F (36.8 °C) (Oral)   Resp 18   Ht 5' 5\" (1.651 m)   Wt 54.9 kg (121 lb)   SpO2 99%   BMI 20.14 kg/m²     ECOG PS=0    Physical Exam:   Gen. Appearance: The patient is in no acute distress. Skin: There is no bruise or rash. HEENT: The exam is unremarkable. Neck: Supple without lymphadenopathy or thyromegaly. Lungs: Clear to auscultation and percussion; there are no wheezes or rhonchi. Heart: Regular rate and rhythm; there are no murmurs, gallops, or rubs. Anterior chest wall and breast: There is no change in the exam.  Abdomen: Bowel sounds are present and normal.  There is no guarding, tenderness, or hepatosplenomegaly. Extremities: There is no clubbing, cyanosis, or edema. Neurologic: There are no focal neurologic deficits. Lymphatics: There is no palpable peripheral lymphadenopathy. Musculoskeletal: The patient has full range of motion at all joints. There is no evidence of joint deformity or effusions. There is no focal joint tenderness. Psychological/psychiatric: There is no clinical evidence of anxiety, depression, or melancholy.     Lab data:      Results for orders placed or performed during the hospital encounter of 11/12/18   CBC WITH 3 PART DIFF     Status: Abnormal   Result Value Ref Range Status    WBC 3.9 (L) 4.5 - 13.0 K/uL Final    RBC 3.43 (L) 4.10 - 5.10 M/uL Final    HGB 11.1 (L) 12.0 - 16 g/dL Final    HCT 33.9 (L) 36 - 48 % Final    MCV 98.8 78 - 102 FL Final    MCH 32.4 25.0 - 35.0 PG Final    MCHC 32.7 31 - 37 g/dL Final    RDW 15.9 (H) 11.5 - 14.5 % Final    PLATELET 166 341 - 846 K/uL Final    NEUTROPHILS 47 40 - 70 % Final    MIXED CELLS 9 0.1 - 17 % Final    LYMPHOCYTES 43 14 - 44 % Final    ABS. NEUTROPHILS 1.8 1.8 - 9.5 K/UL Final    ABS. MIXED CELLS 0.4 0.0 - 2.3 K/uL Final    ABS. LYMPHOCYTES 1.7 1.1 - 5.9 K/UL Final     Comment: Test performed at [place] [address] Location. Results Reviewed by Medical Director. DF AUTOMATED   Final           Assessment:     1. Malignant neoplasm of upper-outer quadrant of right breast in female, estrogen receptor negative ([address]    2. Antineoplastic chemotherapy induced anemia    3. Fatigue due to treatment      Plan:   Invasive ductal adenocarcinoma right upper quadrant of the breast: The patient completed cycle 6 of her neoadjuvant treatment program.  She also has subsequently completed surgery. There was no residual tumor at the time of her surgery and the sentinel lymph node was not. I have explained to the patient that we will plan to resume her adjuvant Herceptin therapy in about 3 more weeks. Chemotherapy-induced anemia: I will check her iron profile and ferritin levels at this time. I have instructed the patient to begin taking Slow Fe 1 tablet daily. She was previously started on Procrit 60,000 unit with her chemotherapy treatment. Her hemoglobin today is 11.1 g/dL with hematocrit of 33.9 %.      Fatigue due to treatment: pt will continue to stay well-hydrated particularly on the weeks of chemotherapy and get  Lots of rest.     Follow-up in 4 weeks  Orders Placed This Encounter    COMPLETE CBC & AUTO DIFF WBC    InHouse CBC ([employer])     Standing Status:   Future     Number of Occurrences:   1     Standing Expiration Date:   11/19/2018    CA 27.29     Standing Status:   Future     Standing Expiration Date:   11/13/2019    FERRITIN     Standing Status:   Future     Standing Expiration Date:   11/13/2019    IRON PROFILE     Standing Status:   Future     Standing Expiration Date:   67/32/7976    METABOLIC PANEL, COMPREHENSIVE     Standing Status:   Future     Standing Expiration Date:   11/13/2019       Baron Magaly MD  9/19/2018          Please note: This document has been produced using voice recognition software. Unrecognized errors in transcription may be present.

## 2018-11-13 LAB — CANCER AG27-29 SERPL-ACNC: 26.7 U/ML (ref 0–38.6)

## 2018-11-20 RX ORDER — POTASSIUM CHLORIDE 750 MG/1
10 CAPSULE, EXTENDED RELEASE ORAL DAILY
Refills: 0 | Status: ON HOLD | COMMUNITY
Start: 2018-08-18 | End: 2019-01-16

## 2018-11-20 RX ORDER — FAMOTIDINE 40 MG/1
TABLET, FILM COATED ORAL
Refills: 1 | COMMUNITY
Start: 2018-09-11 | End: 2018-11-20 | Stop reason: SDUPTHER

## 2018-11-21 RX ORDER — FAMOTIDINE 40 MG/1
40 TABLET, FILM COATED ORAL 2 TIMES DAILY
Qty: 20 TAB | Refills: 0 | Status: ON HOLD | OUTPATIENT
Start: 2018-11-21 | End: 2019-01-16

## 2018-11-26 ENCOUNTER — APPOINTMENT (OUTPATIENT)
Dept: INFUSION THERAPY | Age: 58
End: 2018-11-26
Payer: MEDICAID

## 2018-11-26 ENCOUNTER — HOSPITAL ENCOUNTER (OUTPATIENT)
Dept: INFUSION THERAPY | Age: 58
Discharge: HOME | End: 2018-11-26
Payer: MEDICAID

## 2018-11-26 VITALS
HEIGHT: 65 IN | RESPIRATION RATE: 18 BRPM | TEMPERATURE: 98.3 F | SYSTOLIC BLOOD PRESSURE: 120 MMHG | DIASTOLIC BLOOD PRESSURE: 75 MMHG | HEART RATE: 71 BPM | BODY MASS INDEX: 20.12 KG/M2 | WEIGHT: 120.8 LBS | OXYGEN SATURATION: 100 %

## 2018-11-26 DIAGNOSIS — Z17.1 MALIGNANT NEOPLASM OF UPPER-OUTER QUADRANT OF RIGHT BREAST IN FEMALE, ESTROGEN RECEPTOR NEGATIVE (HCC): Primary | ICD-10-CM

## 2018-11-26 DIAGNOSIS — C50.411 MALIGNANT NEOPLASM OF UPPER-OUTER QUADRANT OF RIGHT BREAST IN FEMALE, ESTROGEN RECEPTOR NEGATIVE (HCC): Primary | ICD-10-CM

## 2018-11-26 LAB
ALBUMIN SERPL-MCNC: 3.2 G/DL (ref 3.4–5)
ALBUMIN/GLOB SERPL: 0.8 {RATIO} (ref 0.8–1.7)
ALP SERPL-CCNC: 95 U/L (ref 45–117)
ALT SERPL-CCNC: 20 U/L (ref 13–56)
ANION GAP SERPL CALC-SCNC: 7 MMOL/L (ref 3–18)
AST SERPL-CCNC: 17 U/L (ref 15–37)
BASO+EOS+MONOS # BLD AUTO: 0.3 K/UL (ref 0–2.3)
BASO+EOS+MONOS # BLD AUTO: 7 % (ref 0.1–17)
BILIRUB SERPL-MCNC: 0.2 MG/DL (ref 0.2–1)
BUN SERPL-MCNC: 9 MG/DL (ref 7–18)
BUN/CREAT SERPL: 11 (ref 12–20)
CALCIUM SERPL-MCNC: 9 MG/DL (ref 8.5–10.1)
CHLORIDE SERPL-SCNC: 108 MMOL/L (ref 100–108)
CO2 SERPL-SCNC: 28 MMOL/L (ref 21–32)
CREAT SERPL-MCNC: 0.83 MG/DL (ref 0.6–1.3)
DIFFERENTIAL METHOD BLD: ABNORMAL
ERYTHROCYTE [DISTWIDTH] IN BLOOD BY AUTOMATED COUNT: 15.4 % (ref 11.5–14.5)
GLOBULIN SER CALC-MCNC: 3.9 G/DL (ref 2–4)
GLUCOSE SERPL-MCNC: 72 MG/DL (ref 74–99)
HCT VFR BLD AUTO: 37.4 % (ref 36–48)
HGB BLD-MCNC: 12.3 G/DL (ref 12–16)
LYMPHOCYTES # BLD: 1.7 K/UL (ref 1.1–5.9)
LYMPHOCYTES NFR BLD: 43 % (ref 14–44)
MCH RBC QN AUTO: 31.7 PG (ref 25–35)
MCHC RBC AUTO-ENTMCNC: 32.9 G/DL (ref 31–37)
MCV RBC AUTO: 96.4 FL (ref 78–102)
NEUTS SEG # BLD: 2 K/UL (ref 1.8–9.5)
NEUTS SEG NFR BLD: 50 % (ref 40–70)
PLATELET # BLD AUTO: 190 K/UL (ref 140–440)
POTASSIUM SERPL-SCNC: 4 MMOL/L (ref 3.5–5.5)
PROT SERPL-MCNC: 7.1 G/DL (ref 6.4–8.2)
RBC # BLD AUTO: 3.88 M/UL (ref 4.1–5.1)
SODIUM SERPL-SCNC: 143 MMOL/L (ref 136–145)
WBC # BLD AUTO: 4 K/UL (ref 4.5–13)

## 2018-11-26 PROCEDURE — 36593 DECLOT VASCULAR DEVICE: CPT

## 2018-11-26 PROCEDURE — 96415 CHEMO IV INFUSION ADDL HR: CPT

## 2018-11-26 PROCEDURE — 77030012965 HC NDL HUBR BBMI -A

## 2018-11-26 PROCEDURE — 74011000250 HC RX REV CODE- 250: Performed by: INTERNAL MEDICINE

## 2018-11-26 PROCEDURE — 74011250636 HC RX REV CODE- 250/636: Performed by: INTERNAL MEDICINE

## 2018-11-26 PROCEDURE — 74011250636 HC RX REV CODE- 250/636

## 2018-11-26 PROCEDURE — 80053 COMPREHEN METABOLIC PANEL: CPT

## 2018-11-26 PROCEDURE — 96413 CHEMO IV INFUSION 1 HR: CPT

## 2018-11-26 PROCEDURE — 85025 COMPLETE CBC W/AUTO DIFF WBC: CPT

## 2018-11-26 RX ORDER — SODIUM CHLORIDE 0.9 % (FLUSH) 0.9 %
10-40 SYRINGE (ML) INJECTION AS NEEDED
Status: DISCONTINUED | OUTPATIENT
Start: 2018-11-26 | End: 2018-11-30 | Stop reason: HOSPADM

## 2018-11-26 RX ORDER — HEPARIN 100 UNIT/ML
SYRINGE INTRAVENOUS
Status: COMPLETED
Start: 2018-11-26 | End: 2018-11-26

## 2018-11-26 RX ORDER — HEPARIN 100 UNIT/ML
500 SYRINGE INTRAVENOUS AS NEEDED
Status: DISCONTINUED | OUTPATIENT
Start: 2018-11-26 | End: 2018-11-30 | Stop reason: HOSPADM

## 2018-11-26 RX ADMIN — Medication 20 ML: at 16:34

## 2018-11-26 RX ADMIN — Medication 20 ML: at 14:35

## 2018-11-26 RX ADMIN — Medication 20 ML: at 16:33

## 2018-11-26 RX ADMIN — Medication 20 ML: at 14:50

## 2018-11-26 RX ADMIN — ALTEPLASE 2 MG: 2.2 INJECTION, POWDER, LYOPHILIZED, FOR SOLUTION INTRAVENOUS at 14:45

## 2018-11-26 RX ADMIN — ALTEPLASE 2 MG: 2.2 INJECTION, POWDER, LYOPHILIZED, FOR SOLUTION INTRAVENOUS at 14:42

## 2018-11-26 RX ADMIN — HEPARIN 500 UNITS: 100 SYRINGE at 16:35

## 2018-11-26 RX ADMIN — TRASTUZUMAB 440 MG: 150 INJECTION, POWDER, LYOPHILIZED, FOR SOLUTION INTRAVENOUS at 14:55

## 2018-11-26 RX ADMIN — Medication 20 ML: at 14:40

## 2018-11-26 RX ADMIN — HEPARIN 500 UNITS: 100 SYRINGE at 16:32

## 2018-11-26 NOTE — PROGRESS NOTES
[facility] Progress Note Date: 2018 Name: Janett Osborne MRN: [id number] : 1960 Ms. Devaughn Rodriguez arrived to [facility] at 3876 ambulatory. Pt is here today for Herceptin, Cycle 7 of 17 Patient had muga scan done in 2018, and it was okay for chemo. NP Jenna Ayoub made aware that patient will need a new muga scan. Okay per Rivka in pharmacy to give hercephin today without updated muga scan Ms. Devaughn Rodriguez was assessed and education was provided. No complaints or concerns voiced Ms. Ventura's vitals were reviewed. Visit Vitals /75 (BP 1 Location: Left arm, BP Patient Position: At rest) Pulse 71 Temp 98.3 °F (36.8 °C) Resp 18 Ht 5' 5\" (1.651 m) Wt 54.8 kg (120 lb 12.8 oz) SpO2 100% BMI 20.10 kg/m² Patient's double ports to upper left chest accessed with 1' 20 gauge hubers ( scant blood returns from medial port, and brisk flush noted, but not enough blood to get blood draw. Scant blood returns noted from lateral port, but not enough for blood draw--good flush noted. Will declot both ports) Blood sample obtained via barb-puncture stick x 1 to right AC for cbc and cmp Lab results were obtained and reviewed. Recent Results (from the past 12 hour(s)) CBC WITH 3 PART DIFF Collection Time: 18  1:45 PM  
Result Value Ref Range WBC 4.0 (L) 4.5 - 13.0 K/uL  
 RBC 3.88 (L) 4.10 - 5.10 M/uL  
 HGB 12.3 12.0 - 16 g/dL HCT 37.4 36 - 48 % MCV 96.4 78 - 102 FL  
 MCH 31.7 25.0 - 35.0 PG  
 MCHC 32.9 31 - 37 g/dL  
 RDW 15.4 (H) 11.5 - 14.5 % PLATELET 793 362 - 501 K/uL NEUTROPHILS 50 40 - 70 % MIXED CELLS 7 0.1 - 17 % LYMPHOCYTES 43 14 - 44 % ABS. NEUTROPHILS 2.0 1.8 - 9.5 K/UL  
 ABS. MIXED CELLS 0.3 0.0 - 2.3 K/uL  
 ABS. LYMPHOCYTES 1.7 1.1 - 5.9 K/UL  
 DF AUTOMATED METABOLIC PANEL, COMPREHENSIVE Collection Time: 18  1:45 PM  
Result Value Ref Range  Sodium 143 136 - 145 mmol/L  
 Potassium 4.0 3.5 - 5.5 mmol/L Chloride 108 100 - 108 mmol/L  
 CO2 28 21 - 32 mmol/L Anion gap 7 3.0 - 18 mmol/L Glucose 72 (L) 74 - 99 mg/dL BUN 9 7.0 - 18 MG/DL Creatinine 0.83 0.6 - 1.3 MG/DL  
 BUN/Creatinine ratio 11 (L) 12 - 20 GFR est AA >60 >60 ml/min/1.73m2 GFR est non-AA >60 >60 ml/min/1.73m2 Calcium 9.0 8.5 - 10.1 MG/DL Bilirubin, total 0.2 0.2 - 1.0 MG/DL  
 ALT (SGPT) 20 13 - 56 U/L  
 AST (SGOT) 17 15 - 37 U/L Alk. phosphatase 95 45 - 117 U/L Protein, total 7.1 6.4 - 8.2 g/dL Albumin 3.2 (L) 3.4 - 5.0 g/dL Globulin 3.9 2.0 - 4.0 g/dL A-G Ratio 0.8 0.8 - 1.7 Procrit held per parameters Labs and weight okay for chemo. Cath karla 2mg/2ml instilled in each port to dwell 45-60 minutes. After 50 minutes, lateral port with brisk blood returns. 5 ml blood discarded from lateral port and flushed with 20 ml normal saline. Hercephin 440 mg infused over 90 minutes as ordered. Patient tolerated well. Brisk flush/blood returns noted from lateral port Medial port with blood returns. 5 ml blood discarded, and port was flushed with 20 ml normal saline 500 units heparin flush instilled in each port per protocol, then hubers removed. Sites without redness, swelling, infiltration or tenderness. Gauze/tape applied to sites. Patient Vitals for the past 8 hrs: 
 Temp Pulse Resp BP SpO2  
11/26/18 1638 98.3 °F (36.8 °C) 71 18 120/75 100 % 11/26/18 1330 98.2 °F (36.8 °C) 86 18 121/85 98 % Reviewed discharge instructions with patient. She verbalized understanding Ms. Devaughn Rodriguez was discharged from [place] [address] in stable condition at [facility] 81.. She is to return on 12/17/18 at 0900 for her next appointment. Temi Perales RN November 26, 2018

## 2018-12-17 ENCOUNTER — HOSPITAL ENCOUNTER (OUTPATIENT)
Dept: INFUSION THERAPY | Age: 58
Discharge: HOME | End: 2018-12-17
Payer: MEDICAID

## 2018-12-17 VITALS
DIASTOLIC BLOOD PRESSURE: 62 MMHG | HEIGHT: 65 IN | TEMPERATURE: 98.3 F | RESPIRATION RATE: 16 BRPM | SYSTOLIC BLOOD PRESSURE: 101 MMHG | OXYGEN SATURATION: 100 % | WEIGHT: 115.4 LBS | HEART RATE: 82 BPM | BODY MASS INDEX: 19.22 KG/M2

## 2018-12-17 DIAGNOSIS — Z17.1 MALIGNANT NEOPLASM OF UPPER-OUTER QUADRANT OF RIGHT BREAST IN FEMALE, ESTROGEN RECEPTOR NEGATIVE (HCC): ICD-10-CM

## 2018-12-17 DIAGNOSIS — C50.011 MALIGNANT NEOPLASM OF NIPPLE OF RIGHT BREAST IN FEMALE, ESTROGEN RECEPTOR POSITIVE (HCC): Primary | ICD-10-CM

## 2018-12-17 DIAGNOSIS — Z17.0 MALIGNANT NEOPLASM OF NIPPLE OF RIGHT BREAST IN FEMALE, ESTROGEN RECEPTOR POSITIVE (HCC): Primary | ICD-10-CM

## 2018-12-17 DIAGNOSIS — C50.411 MALIGNANT NEOPLASM OF UPPER-OUTER QUADRANT OF RIGHT BREAST IN FEMALE, ESTROGEN RECEPTOR NEGATIVE (HCC): ICD-10-CM

## 2018-12-17 LAB
ALBUMIN SERPL-MCNC: 3.4 G/DL (ref 3.4–5)
ALBUMIN/GLOB SERPL: 0.9 {RATIO} (ref 0.8–1.7)
ALP SERPL-CCNC: 92 U/L (ref 45–117)
ALT SERPL-CCNC: 17 U/L (ref 13–56)
ANION GAP SERPL CALC-SCNC: 6 MMOL/L (ref 3–18)
AST SERPL-CCNC: 12 U/L (ref 15–37)
BASO+EOS+MONOS # BLD AUTO: 0.4 K/UL (ref 0–2.3)
BASO+EOS+MONOS # BLD AUTO: 7 % (ref 0.1–17)
BILIRUB SERPL-MCNC: 0.3 MG/DL (ref 0.2–1)
BUN SERPL-MCNC: 16 MG/DL (ref 7–18)
BUN/CREAT SERPL: 21 (ref 12–20)
CALCIUM SERPL-MCNC: 9 MG/DL (ref 8.5–10.1)
CHLORIDE SERPL-SCNC: 110 MMOL/L (ref 100–108)
CO2 SERPL-SCNC: 28 MMOL/L (ref 21–32)
CREAT SERPL-MCNC: 0.76 MG/DL (ref 0.6–1.3)
DIFFERENTIAL METHOD BLD: ABNORMAL
ERYTHROCYTE [DISTWIDTH] IN BLOOD BY AUTOMATED COUNT: 14.5 % (ref 11.5–14.5)
GLOBULIN SER CALC-MCNC: 3.8 G/DL (ref 2–4)
GLUCOSE SERPL-MCNC: 76 MG/DL (ref 74–99)
HCT VFR BLD AUTO: 35.9 % (ref 36–48)
HGB BLD-MCNC: 11.9 G/DL (ref 12–16)
LYMPHOCYTES # BLD: 1.2 K/UL (ref 1.1–5.9)
LYMPHOCYTES NFR BLD: 21 % (ref 14–44)
MCH RBC QN AUTO: 31.6 PG (ref 25–35)
MCHC RBC AUTO-ENTMCNC: 33.1 G/DL (ref 31–37)
MCV RBC AUTO: 95.5 FL (ref 78–102)
NEUTS SEG # BLD: 4.2 K/UL (ref 1.8–9.5)
NEUTS SEG NFR BLD: 73 % (ref 40–70)
PLATELET # BLD AUTO: 145 K/UL (ref 140–440)
POTASSIUM SERPL-SCNC: 4.4 MMOL/L (ref 3.5–5.5)
PROT SERPL-MCNC: 7.2 G/DL (ref 6.4–8.2)
RBC # BLD AUTO: 3.76 M/UL (ref 4.1–5.1)
SODIUM SERPL-SCNC: 144 MMOL/L (ref 136–145)
WBC # BLD AUTO: 5.8 K/UL (ref 4.5–13)

## 2018-12-17 PROCEDURE — 96413 CHEMO IV INFUSION 1 HR: CPT

## 2018-12-17 PROCEDURE — 74011000250 HC RX REV CODE- 250: Performed by: INTERNAL MEDICINE

## 2018-12-17 PROCEDURE — 77030012965 HC NDL HUBR BBMI -A

## 2018-12-17 PROCEDURE — 85025 COMPLETE CBC W/AUTO DIFF WBC: CPT

## 2018-12-17 PROCEDURE — 80053 COMPREHEN METABOLIC PANEL: CPT

## 2018-12-17 PROCEDURE — 36415 COLL VENOUS BLD VENIPUNCTURE: CPT

## 2018-12-17 PROCEDURE — 99211 OFF/OP EST MAY X REQ PHY/QHP: CPT

## 2018-12-17 PROCEDURE — 36593 DECLOT VASCULAR DEVICE: CPT

## 2018-12-17 PROCEDURE — 74011250636 HC RX REV CODE- 250/636

## 2018-12-17 PROCEDURE — 74011250636 HC RX REV CODE- 250/636: Performed by: INTERNAL MEDICINE

## 2018-12-17 RX ORDER — HEPARIN 100 UNIT/ML
500 SYRINGE INTRAVENOUS AS NEEDED
Status: DISCONTINUED | OUTPATIENT
Start: 2018-12-17 | End: 2018-12-20 | Stop reason: HOSPADM

## 2018-12-17 RX ORDER — HEPARIN 100 UNIT/ML
SYRINGE INTRAVENOUS
Status: COMPLETED
Start: 2018-12-17 | End: 2018-12-17

## 2018-12-17 RX ORDER — SODIUM CHLORIDE 0.9 % (FLUSH) 0.9 %
10-40 SYRINGE (ML) INJECTION AS NEEDED
Status: DISCONTINUED | OUTPATIENT
Start: 2018-12-17 | End: 2018-12-20 | Stop reason: HOSPADM

## 2018-12-17 RX ADMIN — Medication 40 ML: at 11:30

## 2018-12-17 RX ADMIN — ALTEPLASE 2 MG: 2.2 INJECTION, POWDER, LYOPHILIZED, FOR SOLUTION INTRAVENOUS at 15:10

## 2018-12-17 RX ADMIN — HEPARIN 1000 UNITS: 100 SYRINGE at 17:10

## 2018-12-17 RX ADMIN — Medication 10 ML: at 16:00

## 2018-12-17 RX ADMIN — Medication 40 ML: at 17:10

## 2018-12-17 RX ADMIN — Medication 20 ML: at 17:05

## 2018-12-17 RX ADMIN — ALTEPLASE 2 MG: 2.2 INJECTION, POWDER, LYOPHILIZED, FOR SOLUTION INTRAVENOUS at 13:00

## 2018-12-17 RX ADMIN — TRASTUZUMAB 330 MG: 150 INJECTION, POWDER, LYOPHILIZED, FOR SOLUTION INTRAVENOUS at 16:30

## 2018-12-17 NOTE — PROGRESS NOTES
[address] Progress Note    Date: 2018    Name: Preet Iglesias    MRN: [id number]         : 1960      Ms. Alana Garcia arrived in the [facility] today at [address], in stable condition, here for Cycle 8, IV Herceptin (Trastuzumab) Infusion & Q 3 Week, CBC/Procrit injection. She was assessed and education was provided. Ms. Ventura's vitals were reviewed. Visit Vitals  BP 97/63 (BP 1 Location: Left arm, BP Patient Position: Sitting)   Pulse 77   Temp 97.9 °F (36.6 °C)   Resp 16   Ht 5' 5\" (1.651 m)   Wt 52.3 kg (115 lb 6.4 oz)   SpO2 100%   Breastfeeding? No   BMI 19.20 kg/m²           Her double lumen port (both lumens) was accessed without incident at 1130. However, neither lumen had a blood return, even though both lumens flushed very easily with NS. Therefore, blood for her labs (CBC & CMP) was drawn from her left hand at 1138. (The CBC was processed in house, and the CMP was sent out to be processed. )    Lab results were obtained and reviewed, and both the CBC & CMP were noted to be satisfactory for treatment today. Procrit injection was HELD today, per order. Recent Results (from the past 12 hour(s))   CBC WITH 3 PART DIFF    Collection Time: 18 11:38 AM   Result Value Ref Range    WBC 5.8 4.5 - 13.0 K/uL    RBC 3.76 (L) 4.10 - 5.10 M/uL    HGB 11.9 (L) 12.0 - 16 g/dL    HCT 35.9 (L) 36 - 48 %    MCV 95.5 78 - 102 FL    MCH 31.6 25.0 - 35.0 PG    MCHC 33.1 31 - 37 g/dL    RDW 14.5 11.5 - 14.5 %    PLATELET 002 137 - 481 K/uL    NEUTROPHILS 73 (H) 40 - 70 %    MIXED CELLS 7 0.1 - 17 %    LYMPHOCYTES 21 14 - 44 %    ABS. NEUTROPHILS 4.2 1.8 - 9.5 K/UL    ABS. MIXED CELLS 0.4 0.0 - 2.3 K/uL    ABS.  LYMPHOCYTES 1.2 1.1 - 5.9 K/UL    DF AUTOMATED     METABOLIC PANEL, COMPREHENSIVE    Collection Time: 18 11:38 AM   Result Value Ref Range    Sodium 144 136 - 145 mmol/L    Potassium 4.4 3.5 - 5.5 mmol/L    Chloride 110 (H) 100 - 108 mmol/L    CO2 28 21 - 32 mmol/L    Anion gap 6 3.0 - 18 mmol/L    Glucose 76 74 - 99 mg/dL    BUN 16 7.0 - 18 MG/DL    Creatinine 0.76 0.6 - 1.3 MG/DL    BUN/Creatinine ratio 21 (H) 12 - 20      GFR est AA >60 >60 ml/min/1.73m2    GFR est non-AA >60 >60 ml/min/1.73m2    Calcium 9.0 8.5 - 10.1 MG/DL    Bilirubin, total 0.3 0.2 - 1.0 MG/DL    ALT (SGPT) 17 13 - 56 U/L    AST (SGOT) 12 (L) 15 - 37 U/L    Alk. phosphatase 92 45 - 117 U/L    Protein, total 7.2 6.4 - 8.2 g/dL    Albumin 3.4 3.4 - 5.0 g/dL    Globulin 3.8 2.0 - 4.0 g/dL    A-G Ratio 0.9 0.8 - 1.7               Both Vitaliy Contreras NP, & Rey Burkitt, NP, were made aware, that Ms. Rene Garay has not had a MUGA scan since 3-2-18 (pre-initial dose of Herceptin) (The MUGA Scan showed a normal ejection fraction of 58%), and according to her Herceptin orders, she should have been getting a MUGA scan every 3 months while on Herceptin. Order was received from Rey Burkitt, NP, to proceed with the Herceptin today, and Ms. Rene Garay was provided with the phone number to schedule her MUGA scan. Ms. Rene Garay verbalized understanding that she MUST have the MUGA scans done as ordered, if she wishes to continue her treatment, and stated that she would call 1st thing in the morning to schedule the scan. Activase/Cathflo 2 mg, was instilled into each lumen of her double lumen port X 2 doses each. (Each dose of Activase/Cathflo remained instilled for the maximum 2 hours per dose.) However, even after 2 doses each, only about 1 ml blood tinged fluid could be withdrawn from either lumen. Vitaliy Contreras NP, was made aware, and gave order to administer Herceptin peripherally for today, and stated that she would also order Ms. Rene Garay to have a port check done.   Piper Garza was also made aware, that currently, Ms. Rene Garay has NOT been prescribed any prophylactic daily 1 mg PO Coumadin, but she stated she would probably wait for the results of the port check, before prescribing any Coumadin at this point.)        PIV was established in the back of her left hand at 1600, without incident. Herceptin (Trastuzumab) 330 mg IV (6 mg/kg) was administered via the peripheral IV, over approximately 30 minutes, per order, and without incident. (Verified with pharmacist Dianne Kat, that 30 minute Herceptin Infusion was OK, since today was cycle 8 of her therapy.)      After completion of the IV Herceptin, the PIV was flushed well with NS, and was removed and gauze/bandaid was applied. Both lumens of her double lumen port, were flushed well with NS & Heparin, per protocol, and then, both johnson needles were removed and gauze/bandaids X 2, were applied. Ms. Marin Bolden tolerated well, and had no complaints. Ms. Marin Bolden was discharged from [place] [address] in stable condition at [phone] She is to return in 3 weeks, on Monday, 1-7-19 at 0900, for her next appointment, for Cycle 9, IV Herceptin Infusion, and CBC/Procrit injection.      Adam Hernandez RN  December 17, 2018  1:18 PM

## 2019-01-02 ENCOUNTER — HOSPITAL ENCOUNTER (OUTPATIENT)
Dept: NON INVASIVE DIAGNOSTICS | Age: 59
Discharge: HOME | End: 2019-01-02
Attending: NURSE PRACTITIONER
Payer: COMMERCIAL

## 2019-01-02 ENCOUNTER — HOSPITAL ENCOUNTER (OUTPATIENT)
Dept: INTERVENTIONAL RADIOLOGY/VASCULAR | Age: 59
Discharge: HOME | End: 2019-01-15
Attending: NURSE PRACTITIONER | Admitting: RADIOLOGY
Payer: COMMERCIAL

## 2019-01-02 DIAGNOSIS — Z17.0 MALIGNANT NEOPLASM OF NIPPLE OF RIGHT BREAST IN FEMALE, ESTROGEN RECEPTOR POSITIVE (HCC): ICD-10-CM

## 2019-01-02 DIAGNOSIS — C50.011 MALIGNANT NEOPLASM OF NIPPLE OF RIGHT BREAST IN FEMALE, ESTROGEN RECEPTOR POSITIVE (HCC): ICD-10-CM

## 2019-01-02 DIAGNOSIS — Z17.1 MALIGNANT NEOPLASM OF UPPER-OUTER QUADRANT OF RIGHT BREAST IN FEMALE, ESTROGEN RECEPTOR NEGATIVE (HCC): ICD-10-CM

## 2019-01-02 DIAGNOSIS — C50.411 MALIGNANT NEOPLASM OF UPPER-OUTER QUADRANT OF RIGHT BREAST IN FEMALE, ESTROGEN RECEPTOR NEGATIVE (HCC): ICD-10-CM

## 2019-01-02 LAB — STRESS TARGET HR: 138 BPM

## 2019-01-02 PROCEDURE — 36598 INJ W/FLUOR EVAL CV DEVICE: CPT

## 2019-01-02 PROCEDURE — 74011250636 HC RX REV CODE- 250/636: Performed by: NURSE PRACTITIONER

## 2019-01-02 PROCEDURE — 78472 GATED HEART PLANAR SINGLE: CPT

## 2019-01-02 PROCEDURE — 74011250636 HC RX REV CODE- 250/636: Performed by: RADIOLOGY

## 2019-01-02 PROCEDURE — 74011636320 HC RX REV CODE- 636/320: Performed by: RADIOLOGY

## 2019-01-02 RX ORDER — HEPARIN 100 UNIT/ML
45 SYRINGE INTRAVENOUS
Status: COMPLETED | OUTPATIENT
Start: 2019-01-02 | End: 2019-01-02

## 2019-01-02 RX ORDER — HEPARIN SODIUM (PORCINE) LOCK FLUSH IV SOLN 100 UNIT/ML 100 UNIT/ML
500 SOLUTION INTRAVENOUS AS NEEDED
Status: DISCONTINUED | OUTPATIENT
Start: 2019-01-02 | End: 2019-01-16 | Stop reason: HOSPADM

## 2019-01-02 RX ORDER — IODIXANOL 320 MG/ML
50 INJECTION, SOLUTION INTRAVASCULAR
Status: COMPLETED | OUTPATIENT
Start: 2019-01-02 | End: 2019-01-02

## 2019-01-02 RX ADMIN — HEPARIN 45 UNITS: 100 SYRINGE at 09:45

## 2019-01-02 RX ADMIN — IODIXANOL 10 ML: 320 INJECTION, SOLUTION INTRAVASCULAR at 08:43

## 2019-01-02 RX ADMIN — HEPARIN SODIUM (PORCINE) LOCK FLUSH IV SOLN 100 UNIT/ML 500 UNITS: 100 SOLUTION at 08:42

## 2019-01-02 NOTE — PROCEDURES
Interventional Radiology Brief Procedure Note    Patient: Coletta Bumpers MRN: [id number]  SSN: [id number]    YOB: 1960  Age: 62 y.o. Sex: female      Date of Procedure: 1/2/2019    Procedure(s): Mediport Dye Study    Performed By: BEKA Amaya    Anesthesia: None    Estimated Blood Loss: None    Specimens: None    Findings:     - Written and verbal informed consent was obtained. - Time Out was performed. - Permanent image storage performed on PACS. - Image-guided left chest dual lumen mediport dye study performed using maximum barrier precautions and sterile technique. - Please refer to report on PACS for full details. Implants: None    Plan: Recommend port removal and replacement given the presence of fibrin sheath and backflow of blood into subclavian veins and surrounding vasculature.       Signed By: Reynaldo Amaya     January 2, 2019

## 2019-01-02 NOTE — H&P
OUTPATIENT HISTORY AND PHYSICAL      Today 1/2/2019     Indication/Symptoms:   Francisco Javier Mcgill is a 62 y.o. female with a history of right breast cancer s/p left chest port placement by General Surgery for chemotherapy. She presented today for a mediport dye study as there has been difficulty with blood draws from both ports. Current Meds:    Prior to Admission medications    Medication Sig Start Date End Date Taking? Authorizing Provider   famotidine (PEPCID) 40 mg tablet Take 1 Tab by mouth two (2) times a day. 11/21/18   Anthony Canada, [facility]   potassium chloride SA (MICRO-K) 10 mEq capsule 10 mEq daily. 8/18/18   Provider, Historical   docusate sodium (COLACE) 100 mg capsule Take 1 Cap by mouth two (2) times a day for 90 days. 10/19/18 1/17/19  Narda Correa MD   oxyCODONE-acetaminophen (PERCOCET) 5-325 mg per tablet Take 1 Tab by mouth every six (6) hours as needed for Pain. Max Daily Amount: 4 Tabs. 10/19/18   Narda Correa MD       Allergies: Allergies   Allergen Reactions    Shellfish Derived Swelling       Comorbid Conditions:    Past Medical History:   Diagnosis Date    Cancer ([facility] [address]     right breast    Hepatitis B     S/P chemotherapy, time since 4-12 weeks     Tuberculosis 1980    Wrist fracture, right 10/13/2018          Past Surgical History:   Procedure Laterality Date    BX/REMV,LYMPH NODE,DEEP AXILL Right 10/19/2018    Dr. Latia Gil HX GYN      hysterectomy     HX MASTECTOMY Right 10/19/2018    RIGHT BREAST NEEDLE LOCALIZED LUMPECTOMY AND SENTINEL LYMPH NODE (LOC 0830; NUC 10.18.18 @ 0900) performed by Narda Correa MD at [address] [place] [address] OTHER SURGICAL      neck surgery     [facility]R/> N/A 03/08/2018    Dr. Cr Gan     Data:    There were no vitals taken for this visit.:  No results for input(s): PLT, PLTEXT in the last 72 hours.     No lab exists for component:  HCT  No results for input(s): INR, APTT in the last 72 hours.    No lab exists for component: PT, INREXT    The H & P and/or progress notes and any available imaging were reviewed. The risks, indications and possible alternatives to the procedure, including doing nothing, were discussed and informed consent was obtained. Physical Exam:      Mental status:   Alert and oriented. Examination specific to the procedure proposed to be performed and any co morbid conditions:     Heart:   Regular rate. Lungs:   Normal respiratory effort. Symmetrical rise and fall of chest    The patient is an appropriate candidate to undergo the planned procedure.     Reynaldo Valladares

## 2019-01-02 NOTE — PROGRESS NOTES
Patient was injected with  33.0 milliCuries  99mTc-Ultratag labeled RBCs. Patient's armband was discarded and shredded.

## 2019-01-07 ENCOUNTER — OFFICE VISIT (OUTPATIENT)
Dept: ONCOLOGY | Age: 59
End: 2019-01-07

## 2019-01-07 ENCOUNTER — HOSPITAL ENCOUNTER (OUTPATIENT)
Dept: LAB | Age: 59
Discharge: HOME | End: 2019-01-07
Payer: COMMERCIAL

## 2019-01-07 ENCOUNTER — HOSPITAL ENCOUNTER (OUTPATIENT)
Dept: INFUSION THERAPY | Age: 59
Discharge: HOME | End: 2019-01-07
Payer: COMMERCIAL

## 2019-01-07 VITALS
HEART RATE: 95 BPM | BODY MASS INDEX: 19.73 KG/M2 | DIASTOLIC BLOOD PRESSURE: 78 MMHG | SYSTOLIC BLOOD PRESSURE: 124 MMHG | OXYGEN SATURATION: 100 % | HEIGHT: 65 IN | WEIGHT: 118.4 LBS | RESPIRATION RATE: 18 BRPM | TEMPERATURE: 97.6 F

## 2019-01-07 VITALS
TEMPERATURE: 97.6 F | BODY MASS INDEX: 19.66 KG/M2 | WEIGHT: 118 LBS | OXYGEN SATURATION: 100 % | RESPIRATION RATE: 18 BRPM | HEART RATE: 95 BPM | HEIGHT: 65 IN | DIASTOLIC BLOOD PRESSURE: 78 MMHG | SYSTOLIC BLOOD PRESSURE: 124 MMHG

## 2019-01-07 DIAGNOSIS — D64.81 ANTINEOPLASTIC CHEMOTHERAPY INDUCED ANEMIA: ICD-10-CM

## 2019-01-07 DIAGNOSIS — T45.1X5A ANTINEOPLASTIC CHEMOTHERAPY INDUCED ANEMIA: ICD-10-CM

## 2019-01-07 DIAGNOSIS — Z17.1 MALIGNANT NEOPLASM OF UPPER-OUTER QUADRANT OF RIGHT BREAST IN FEMALE, ESTROGEN RECEPTOR NEGATIVE (HCC): ICD-10-CM

## 2019-01-07 DIAGNOSIS — C50.411 MALIGNANT NEOPLASM OF UPPER-OUTER QUADRANT OF RIGHT BREAST IN FEMALE, ESTROGEN RECEPTOR NEGATIVE (HCC): ICD-10-CM

## 2019-01-07 DIAGNOSIS — C50.411 MALIGNANT NEOPLASM OF UPPER-OUTER QUADRANT OF RIGHT BREAST IN FEMALE, ESTROGEN RECEPTOR NEGATIVE (HCC): Primary | ICD-10-CM

## 2019-01-07 DIAGNOSIS — Z17.1 MALIGNANT NEOPLASM OF UPPER-OUTER QUADRANT OF RIGHT BREAST IN FEMALE, ESTROGEN RECEPTOR NEGATIVE (HCC): Primary | ICD-10-CM

## 2019-01-07 DIAGNOSIS — R53.83 FATIGUE DUE TO TREATMENT: ICD-10-CM

## 2019-01-07 DIAGNOSIS — E87.6 HYPOKALEMIA: ICD-10-CM

## 2019-01-07 LAB
ALBUMIN SERPL-MCNC: 3.3 G/DL (ref 3.4–5)
ALBUMIN SERPL-MCNC: 3.6 G/DL (ref 3.4–5)
ALBUMIN/GLOB SERPL: 0.9 {RATIO} (ref 0.8–1.7)
ALBUMIN/GLOB SERPL: 1 {RATIO} (ref 0.8–1.7)
ALP SERPL-CCNC: 92 U/L (ref 45–117)
ALP SERPL-CCNC: 93 U/L (ref 45–117)
ALT SERPL-CCNC: 15 U/L (ref 13–56)
ALT SERPL-CCNC: 19 U/L (ref 13–56)
ANION GAP SERPL CALC-SCNC: 5 MMOL/L (ref 3–18)
ANION GAP SERPL CALC-SCNC: 8 MMOL/L (ref 3–18)
AST SERPL-CCNC: 10 U/L (ref 15–37)
AST SERPL-CCNC: 15 U/L (ref 15–37)
BASO+EOS+MONOS # BLD AUTO: 0.3 K/UL (ref 0–2.3)
BASO+EOS+MONOS NFR BLD AUTO: 5 % (ref 0.1–17)
BILIRUB SERPL-MCNC: 0.3 MG/DL (ref 0.2–1)
BILIRUB SERPL-MCNC: 0.5 MG/DL (ref 0.2–1)
BUN SERPL-MCNC: 12 MG/DL (ref 7–18)
BUN SERPL-MCNC: 14 MG/DL (ref 7–18)
BUN/CREAT SERPL: 15 (ref 12–20)
BUN/CREAT SERPL: 17 (ref 12–20)
CALCIUM SERPL-MCNC: 8.4 MG/DL (ref 8.5–10.1)
CALCIUM SERPL-MCNC: 8.4 MG/DL (ref 8.5–10.1)
CHLORIDE SERPL-SCNC: 107 MMOL/L (ref 100–108)
CHLORIDE SERPL-SCNC: 108 MMOL/L (ref 100–108)
CO2 SERPL-SCNC: 27 MMOL/L (ref 21–32)
CO2 SERPL-SCNC: 27 MMOL/L (ref 21–32)
CREAT SERPL-MCNC: 0.78 MG/DL (ref 0.6–1.3)
CREAT SERPL-MCNC: 0.84 MG/DL (ref 0.6–1.3)
DIFFERENTIAL METHOD BLD: ABNORMAL
ERYTHROCYTE [DISTWIDTH] IN BLOOD BY AUTOMATED COUNT: 14.3 % (ref 11.5–14.5)
FERRITIN SERPL-MCNC: 93 NG/ML (ref 8–388)
GLOBULIN SER CALC-MCNC: 3.7 G/DL (ref 2–4)
GLOBULIN SER CALC-MCNC: 3.7 G/DL (ref 2–4)
GLUCOSE SERPL-MCNC: 101 MG/DL (ref 74–99)
GLUCOSE SERPL-MCNC: 84 MG/DL (ref 74–99)
HCT VFR BLD AUTO: 34.3 % (ref 36–48)
HGB BLD-MCNC: 11.5 G/DL (ref 12–16)
IRON SATN MFR SERPL: 33 %
IRON SERPL-MCNC: 87 UG/DL (ref 50–175)
LYMPHOCYTES # BLD: 1.3 K/UL (ref 1.1–5.9)
LYMPHOCYTES NFR BLD: 26 % (ref 14–44)
MCH RBC QN AUTO: 31.8 PG (ref 25–35)
MCHC RBC AUTO-ENTMCNC: 33.5 G/DL (ref 31–37)
MCV RBC AUTO: 94.8 FL (ref 78–102)
NEUTS SEG # BLD: 3.4 K/UL (ref 1.8–9.5)
NEUTS SEG NFR BLD: 69 % (ref 40–70)
PLATELET # BLD AUTO: 147 K/UL (ref 140–440)
POTASSIUM SERPL-SCNC: 3.8 MMOL/L (ref 3.5–5.5)
POTASSIUM SERPL-SCNC: 3.9 MMOL/L (ref 3.5–5.5)
PROT SERPL-MCNC: 7 G/DL (ref 6.4–8.2)
PROT SERPL-MCNC: 7.3 G/DL (ref 6.4–8.2)
RBC # BLD AUTO: 3.62 M/UL (ref 4.1–5.1)
SODIUM SERPL-SCNC: 139 MMOL/L (ref 136–145)
SODIUM SERPL-SCNC: 143 MMOL/L (ref 136–145)
TIBC SERPL-MCNC: 263 UG/DL (ref 250–450)
WBC # BLD AUTO: 5 K/UL (ref 4.5–13)

## 2019-01-07 PROCEDURE — 83540 ASSAY OF IRON: CPT

## 2019-01-07 PROCEDURE — 74011250636 HC RX REV CODE- 250/636: Performed by: INTERNAL MEDICINE

## 2019-01-07 PROCEDURE — 96413 CHEMO IV INFUSION 1 HR: CPT

## 2019-01-07 PROCEDURE — 86300 IMMUNOASSAY TUMOR CA 15-3: CPT

## 2019-01-07 PROCEDURE — 74011000250 HC RX REV CODE- 250: Performed by: INTERNAL MEDICINE

## 2019-01-07 PROCEDURE — 80053 COMPREHEN METABOLIC PANEL: CPT

## 2019-01-07 PROCEDURE — 85025 COMPLETE CBC W/AUTO DIFF WBC: CPT

## 2019-01-07 PROCEDURE — 74011250636 HC RX REV CODE- 250/636

## 2019-01-07 PROCEDURE — 82728 ASSAY OF FERRITIN: CPT

## 2019-01-07 PROCEDURE — 77030012965 HC NDL HUBR BBMI -A

## 2019-01-07 RX ORDER — SODIUM CHLORIDE 0.9 % (FLUSH) 0.9 %
10-40 SYRINGE (ML) INJECTION AS NEEDED
Status: DISCONTINUED | OUTPATIENT
Start: 2019-01-07 | End: 2019-01-11 | Stop reason: HOSPADM

## 2019-01-07 RX ORDER — SODIUM CHLORIDE 9 MG/ML
25 INJECTION, SOLUTION INTRAVENOUS CONTINUOUS
Status: DISPENSED | OUTPATIENT
Start: 2019-01-07 | End: 2019-01-08

## 2019-01-07 RX ORDER — HEPARIN 100 UNIT/ML
SYRINGE INTRAVENOUS
Status: DISPENSED
Start: 2019-01-07 | End: 2019-01-07

## 2019-01-07 RX ORDER — HEPARIN 100 UNIT/ML
500 SYRINGE INTRAVENOUS ONCE
Status: ACTIVE | OUTPATIENT
Start: 2019-01-07 | End: 2019-01-07

## 2019-01-07 RX ADMIN — Medication 10 ML: at 11:56

## 2019-01-07 RX ADMIN — TRASTUZUMAB 330 MG: 150 INJECTION, POWDER, LYOPHILIZED, FOR SOLUTION INTRAVENOUS at 11:11

## 2019-01-07 RX ADMIN — ALTEPLASE 2 MG: 2.2 INJECTION, POWDER, LYOPHILIZED, FOR SOLUTION INTRAVENOUS at 09:23

## 2019-01-07 NOTE — PROGRESS NOTES
[facility] Progress Note Date: 2019 Name: Karina Hernadez MRN: [id number] : 1960 Herceptin C9 
 
 
Ms. Pete Calvillo arrived in the [facility] today at 0900, in stable condition, here for Cycle 9, IV Herceptin (Trastuzumab) Infusion & Q 3 Week, CBC/Procrit injection. She was assessed and education was provided. Ms. Ventura's vitals were reviewed. Visit Vitals /78 (BP 1 Location: Left arm, BP Patient Position: Sitting) Pulse 95 Temp 97.6 °F (36.4 °C) Resp 18 Ht 5' 5\" (1.651 m) Wt 53.7 kg (118 lb 6.4 oz) SpO2 100% Breastfeeding? No  
BMI 19.70 kg/m² Her double lumen port (both lumens) was accessed without incident However, neither lumen had a blood return, even though both lumens flushed very easily with NS. Cathflo 2 mg instilled into both lumens of the port but was unsuccessful in obtaining blood return. It was later discovered that the patient has had a port study and it was recommened port be removed due to a fibrin sheath not allowing blood return. Port was deaccessed. 24G PIV inserted into the right AC x 2 attempts and labs drawn off for CBC and CMP. Recent Results (from the past 12 hour(s)) CBC WITH 3 PART DIFF Collection Time: 19 10:41 AM  
Result Value Ref Range WBC 5.0 4.5 - 13.0 K/uL  
 RBC 3.62 (L) 4.10 - 5.10 M/uL  
 HGB 11.5 (L) 12.0 - 16 g/dL HCT 34.3 (L) 36 - 48 % MCV 94.8 78 - 102 FL  
 MCH 31.8 25.0 - 35.0 PG  
 MCHC 33.5 31 - 37 g/dL  
 RDW 14.3 11.5 - 14.5 % PLATELET 493 710 - 872 K/uL NEUTROPHILS 69 40 - 70 % MIXED CELLS 5 0.1 - 17 % LYMPHOCYTES 26 14 - 44 % ABS. NEUTROPHILS 3.4 1.8 - 9.5 K/UL  
 ABS. MIXED CELLS 0.3 0.0 - 2.3 K/uL  
 ABS. LYMPHOCYTES 1.3 1.1 - 5.9 K/UL  
 DF AUTOMATED METABOLIC PANEL, COMPREHENSIVE Collection Time: 19 10:41 AM  
Result Value Ref Range Sodium 143 136 - 145 mmol/L Potassium 3.8 3.5 - 5.5 mmol/L  Chloride 108 100 - 108 mmol/L  
 CO2 27 21 - 32 mmol/L Anion gap 8 3.0 - 18 mmol/L Glucose 101 (H) 74 - 99 mg/dL BUN 14 7.0 - 18 MG/DL Creatinine 0.84 0.6 - 1.3 MG/DL  
 BUN/Creatinine ratio 17 12 - 20 GFR est AA >60 >60 ml/min/1.73m2 GFR est non-AA >60 >60 ml/min/1.73m2 Calcium 8.4 (L) 8.5 - 10.1 MG/DL Bilirubin, total 0.3 0.2 - 1.0 MG/DL  
 ALT (SGPT) 15 13 - 56 U/L  
 AST (SGOT) 10 (L) 15 - 37 U/L Alk. phosphatase 93 45 - 117 U/L Protein, total 7.0 6.4 - 8.2 g/dL Albumin 3.3 (L) 3.4 - 5.0 g/dL Globulin 3.7 2.0 - 4.0 g/dL A-G Ratio 0.9 0.8 - 1.7 Results within parameters to HOLD procrit today. Herceptin (Trastuzumab) 330 mg IV (6 mg/kg) was administered via the peripheral IV, over approximately 30 minutes, per order, and without incident. After completion of the IV Herceptin, the PIV was flushed well with NS, and was removed and gauze/bandaid was applied. Ms. Terrie Madison tolerated well, and had no complaints. Ms. Terrie Madison was discharged from [place] [address] in stable condition at 1210. She is to return in 3 weeks, on Monday, 1-28-19 at 0900, for her next appointment, for Cycle 10, IV Herceptin Infusion, and CBC/Procrit injection. Robert Giraldo RN 
January 7, 2019

## 2019-01-07 NOTE — PATIENT INSTRUCTIONS

## 2019-01-07 NOTE — PROGRESS NOTES
Hematology/Oncology  Progress Note    Name: Michelle Alvarez  Date: 2019  : 1960    PCP: Andre Lopez MD     Ms. Kvng Pedro is a 62 y.o. -American woman with HER-2 positive invasive ductal adenocarcinoma the right breast    Current therapy: Carboplatin, Taxotere, and Herceptin plus Perjeda. The patient completed 6 cycles of adjuvant therapy and is now completed her final surgical procedure. Subjective:     Ms. Kvng Pedro is a 26-year-old -American woman who is currently receiving neoadjuvant systemic therapy for her HER-2 positive invasive ductal adenocarcinoma the right breast.  On 9/10/2018 she completed cycle 6 of her treatment. The patient reports that she tolerated the medication well. She did not experience any significant nausea or emesis. She has experienced a mild degree of fatigue. Today, she has no new or additional complaints to report. The patient has subsequently undergone surgery and informed me that there was no residual tumor at the surgical site. The patient was recently restarted on her Herceptin medication every 21 days and is tolerating it well. She is requesting a referral to the interventional radiologist to have the nonfunctioning MediPort changed out. Past medical history, family history, and social history: these were reviewed and remains unchanged.     Past Medical History:   Diagnosis Date    Cancer [facility])     right breast    Hepatitis B     S/P chemotherapy, time since 4-12 weeks     Tuberculosis     Wrist fracture, right 10/13/2018     Past Surgical History:   Procedure Laterality Date    BX/REMV,LYMPH NODE,DEEP AXILL Right 10/19/2018    Dr. Angeles Friend HX GYN      hysterectomy     HX MASTECTOMY Right 10/19/2018    RIGHT BREAST NEEDLE LOCALIZED LUMPECTOMY AND SENTINEL LYMPH NODE (LOC 0830; NUC 10.18.18 @ 0900) performed by Gia Daigle MD at [address] [place] [address] HX OTHER SURGICAL      neck surgery     [place] INSJ [facility] CTR VAD W/SUBQ PORT AGE 5 YR/> N/A 03/08/2018    Dr. Toño Hernnadez     Social History     Socioeconomic History    Marital status: SINGLE     Spouse name: Not on file    Number of children: Not on file    Years of education: Not on file    Highest education level: Not on file   Social Needs    Financial resource strain: Not on file    Food insecurity - worry: Not on file    Food insecurity - inability: Not on file    Transportation needs - medical: Not on file   [place] needs - non-medical: Not on file   Occupational History    Not on file   Tobacco Use    Smoking status: Never Smoker    Smokeless tobacco: Current User   Substance and Sexual Activity    Alcohol use: No     Alcohol/week: 1.2 oz     Types: 2 Cans of beer per week     Comment: weekly     Drug use: No    Sexual activity: No   Other Topics Concern    Not on file   Social History Narrative    Not on file     Family History   Problem Relation Age of Onset    Stroke Mother     Breast Cancer Mother     Heart Disease Father     Cancer Maternal Grandmother      Facility-Administered Medications Ordered in Other Visits   Medication Dose Route Frequency Provider Last Rate Last Dose    epoetin sixto (EPOGEN;PROCRIT) injection 40,000 Units  40,000 Units SubCUTAneous ONCE Day Hauser MD        Followed by   [facility] epoetin sixto (EPOGEN;PROCRIT) injection 20,000 Units  20,000 Units SubCUTAneous ONCE Day Hauser MD        sodium chloride (NS) flush 10-40 mL  10-40 mL IntraVENous PRN Dya Hauser MD   10 mL at 01/07/19 1156    heparin (porcine) pf 500 Units  500 Units InterCATHeter ONCE Day Hauser MD        0.9% sodium chloride infusion  25 mL/hr IntraVENous CONTINUOUS Day Hauser MD        alteplase (CATHFLO) 2 mg in sterile water (preservative free) 2 mL injection  2 mg InterCATHeter Rubi Galaviz MD        heparin (porcine) pf 100 unit/mL             heparin (porcine) 100 unit/mL injection 500 Units  500 Units InterCATHeter PRN Archie Queen MD   500 Units at 01/02/19 0842    heparin (porcine) 100 unit/mL injection 500 Units  500 Units InterCATHeter PRN Karyn Mendoza MD   500 Units at 01/02/19 9504       Review of Systems  Constitutional: The patient has complaints of a mild degree of fatigue and some weakness following chemotherapy. She has a noticeable increase in her food intake due to increasing appetite following chemotherapy. HEENT: The patient denies recent head trauma, eye pain, blurred vision,  hearing deficit, oropharyngeal mucosal pain or lesions, and the patient denies throat pain or discomfort. Lymphatics: The patient denies palpable peripheral lymphadenopathy. Hematologic: The patient denies having bruising, bleeding, or progressive fatigue. Respiratory: Patient denies having shortness of breath, cough, sputum production, fever, or dyspnea on exertion. Cardiovascular: The patient denies having leg pain, leg swelling, heart palpitations, chest permit, chest pain, or lightheadedness. The patient denies having dyspnea on exertion. Gastrointestinal: The patient denies having nausea, emesis, or diarrhea. The patient denies having any hematemesis or blood in the stool. Genitourinary: Patient denies having urinary urgency, frequency, or dysuria. The patient denies having blood in the urine. Psychological: The patient denies having symptoms of nervousness, anxiety, depression, or thoughts of harming self. Skin: Patient denies having skin rashes, skin, ulcerations, or unexplained itching or pruritus. Musculoskeletal: The patient denies having pain in the joints or bones. Objective:     Visit Vitals  /78   Pulse 95   Temp 97.6 °F (36.4 °C) (Oral)   Resp 18   Ht 5' 5\" (1.651 m)   Wt 53.5 kg (118 lb)   SpO2 100%   BMI 19.64 kg/m²     ECOG PS=0    Physical Exam:   Gen. Appearance: The patient is in no acute distress. Skin: There is no bruise or rash. HEENT: The exam is unremarkable.   Neck: Supple without lymphadenopathy or thyromegaly. Lungs: Clear to auscultation and percussion; there are no wheezes or rhonchi. Heart: Regular rate and rhythm; there are no murmurs, gallops, or rubs. Anterior chest wall and breast: There is no change in the exam.  Abdomen: Bowel sounds are present and normal.  There is no guarding, tenderness, or hepatosplenomegaly. Extremities: There is no clubbing, cyanosis, or edema. Neurologic: There are no focal neurologic deficits. Lymphatics: There is no palpable peripheral lymphadenopathy. Musculoskeletal: The patient has full range of motion at all joints. There is no evidence of joint deformity or effusions. There is no focal joint tenderness. Psychological/psychiatric: There is no clinical evidence of anxiety, depression, or melancholy. Lab data:      Results for orders placed or performed during the hospital encounter of 01/07/19   CBC WITH 3 PART DIFF     Status: Abnormal   Result Value Ref Range Status    WBC 5.0 4.5 - 13.0 K/uL Final    RBC 3.62 (L) 4.10 - 5.10 M/uL Final    HGB 11.5 (L) 12.0 - 16 g/dL Final    HCT 34.3 (L) 36 - 48 % Final    MCV 94.8 78 - 102 FL Final    MCH 31.8 25.0 - 35.0 PG Final    MCHC 33.5 31 - 37 g/dL Final    RDW 14.3 11.5 - 14.5 % Final    PLATELET 574 867 - 764 K/uL Final    NEUTROPHILS 69 40 - 70 % Final    MIXED CELLS 5 0.1 - 17 % Final    LYMPHOCYTES 26 14 - 44 % Final    ABS. NEUTROPHILS 3.4 1.8 - 9.5 K/UL Final    ABS. MIXED CELLS 0.3 0.0 - 2.3 K/uL Final    ABS. LYMPHOCYTES 1.3 1.1 - 5.9 K/UL Final     Comment: Test performed at [place] [address] Location. Results Reviewed by Medical Director. DF AUTOMATED   Final           Assessment:     1. Malignant neoplasm of upper-outer quadrant of right breast in female, estrogen receptor negative ([facility] [address]    2. Antineoplastic chemotherapy induced anemia    3. Fatigue due to treatment    4.  Hypokalemia      Plan:   Invasive ductal adenocarcinoma right upper quadrant of the breast: The patient completed cycle 6 of her neoadjuvant treatment program.  She also has subsequently completed surgery. There was no residual tumor at the time of her surgery and the sentinel lymph node was not. Herceptin will continue every 3 weeks until she completes a full year of therapy. She will be referred to the interventional radiologist to have a Mediport changed out. Chemotherapy-induced anemia: I will check her iron profile and ferritin levels at this time. I have instructed the patient to begin taking Slow Fe 1 tablet daily. She was previously started on Procrit 60,000 unit with her chemotherapy treatment. Her hemoglobin today is 11.5 g/dL with hematocrit of 34.3 %. Fatigue due to treatment: pt will continue to stay well-hydrated particularly on the weeks of chemotherapy and get  Lots of rest.     Follow-up in 4 weeks  Orders Placed This Encounter    METABOLIC PANEL, COMPREHENSIVE     Standing Status:   Future     Standing Expiration Date:   1/8/2020    IRON PROFILE     Standing Status:   Future     Standing Expiration Date:   1/8/2020    FERRITIN     Standing Status:   Future     Standing Expiration Date:   1/8/2020    CA 27.29     Standing Status:   Future     Standing Expiration Date:   1/8/2020       Alcides Lopez MD  1/7/2018          Please note: This document has been produced using voice recognition software. Unrecognized errors in transcription may be present.

## 2019-01-09 LAB — CANCER AG27-29 SERPL-ACNC: 27.6 U/ML (ref 0–38.6)

## 2019-01-16 ENCOUNTER — HOSPITAL ENCOUNTER (OUTPATIENT)
Dept: INTERVENTIONAL RADIOLOGY/VASCULAR | Age: 59
Discharge: HOME | End: 2019-01-16
Attending: INTERNAL MEDICINE | Admitting: RADIOLOGY
Payer: COMMERCIAL

## 2019-01-16 VITALS
HEIGHT: 65 IN | HEART RATE: 68 BPM | OXYGEN SATURATION: 100 % | WEIGHT: 118 LBS | SYSTOLIC BLOOD PRESSURE: 89 MMHG | RESPIRATION RATE: 10 BRPM | DIASTOLIC BLOOD PRESSURE: 53 MMHG | BODY MASS INDEX: 19.66 KG/M2

## 2019-01-16 DIAGNOSIS — C50.411 MALIGNANT NEOPLASM OF UPPER-OUTER QUADRANT OF RIGHT BREAST IN FEMALE, ESTROGEN RECEPTOR NEGATIVE (HCC): ICD-10-CM

## 2019-01-16 DIAGNOSIS — Z17.1 MALIGNANT NEOPLASM OF UPPER-OUTER QUADRANT OF RIGHT BREAST IN FEMALE, ESTROGEN RECEPTOR NEGATIVE (HCC): ICD-10-CM

## 2019-01-16 LAB
ANION GAP SERPL CALC-SCNC: 3 MMOL/L (ref 3–18)
APTT PPP: 28.4 SEC (ref 23–36.4)
BUN SERPL-MCNC: 17 MG/DL (ref 7–18)
BUN/CREAT SERPL: 22 (ref 12–20)
CALCIUM SERPL-MCNC: 8.6 MG/DL (ref 8.5–10.1)
CHLORIDE SERPL-SCNC: 108 MMOL/L (ref 100–108)
CO2 SERPL-SCNC: 30 MMOL/L (ref 21–32)
CREAT SERPL-MCNC: 0.77 MG/DL (ref 0.6–1.3)
ERYTHROCYTE [DISTWIDTH] IN BLOOD BY AUTOMATED COUNT: 14 % (ref 11.6–14.5)
GLUCOSE SERPL-MCNC: 69 MG/DL (ref 74–99)
HCT VFR BLD AUTO: 35.9 % (ref 35–45)
HGB BLD-MCNC: 11.7 G/DL (ref 12–16)
INR PPP: 1 (ref 0.8–1.2)
MCH RBC QN AUTO: 30.1 PG (ref 24–34)
MCHC RBC AUTO-ENTMCNC: 32.6 G/DL (ref 31–37)
MCV RBC AUTO: 92.3 FL (ref 74–97)
PLATELET # BLD AUTO: 141 K/UL (ref 135–420)
PMV BLD AUTO: 8.9 FL (ref 9.2–11.8)
POTASSIUM SERPL-SCNC: 4 MMOL/L (ref 3.5–5.5)
PROTHROMBIN TIME: 12.5 SEC (ref 11.5–15.2)
RBC # BLD AUTO: 3.89 M/UL (ref 4.2–5.3)
SODIUM SERPL-SCNC: 141 MMOL/L (ref 136–145)
WBC # BLD AUTO: 5.2 K/UL (ref 4.6–13.2)

## 2019-01-16 PROCEDURE — 36561 INSERT TUNNELED CV CATH: CPT

## 2019-01-16 PROCEDURE — 74011000250 HC RX REV CODE- 250: Performed by: PHYSICIAN ASSISTANT

## 2019-01-16 PROCEDURE — 85730 THROMBOPLASTIN TIME PARTIAL: CPT

## 2019-01-16 PROCEDURE — 74011250636 HC RX REV CODE- 250/636: Performed by: PHYSICIAN ASSISTANT

## 2019-01-16 PROCEDURE — 80048 BASIC METABOLIC PNL TOTAL CA: CPT

## 2019-01-16 PROCEDURE — 74011250636 HC RX REV CODE- 250/636

## 2019-01-16 PROCEDURE — 85610 PROTHROMBIN TIME: CPT

## 2019-01-16 PROCEDURE — 74011250636 HC RX REV CODE- 250/636: Performed by: STUDENT IN AN ORGANIZED HEALTH CARE EDUCATION/TRAINING PROGRAM

## 2019-01-16 PROCEDURE — 85027 COMPLETE CBC AUTOMATED: CPT

## 2019-01-16 RX ORDER — DIPHENHYDRAMINE HYDROCHLORIDE 50 MG/ML
INJECTION, SOLUTION INTRAMUSCULAR; INTRAVENOUS
Status: COMPLETED
Start: 2019-01-16 | End: 2019-01-16

## 2019-01-16 RX ORDER — SODIUM CHLORIDE 9 MG/ML
20 INJECTION, SOLUTION INTRAVENOUS CONTINUOUS
Status: DISCONTINUED | OUTPATIENT
Start: 2019-01-16 | End: 2019-01-16 | Stop reason: HOSPADM

## 2019-01-16 RX ORDER — HEPARIN SODIUM (PORCINE) LOCK FLUSH IV SOLN 100 UNIT/ML 100 UNIT/ML
500 SOLUTION INTRAVENOUS AS NEEDED
Status: DISCONTINUED | OUTPATIENT
Start: 2019-01-16 | End: 2019-01-16 | Stop reason: HOSPADM

## 2019-01-16 RX ORDER — MIDAZOLAM HYDROCHLORIDE 1 MG/ML
1 INJECTION, SOLUTION INTRAMUSCULAR; INTRAVENOUS
Status: DISCONTINUED | OUTPATIENT
Start: 2019-01-16 | End: 2019-01-16 | Stop reason: HOSPADM

## 2019-01-16 RX ORDER — CEFAZOLIN SODIUM 2 G/50ML
2 SOLUTION INTRAVENOUS ONCE
Status: COMPLETED | OUTPATIENT
Start: 2019-01-16 | End: 2019-01-16

## 2019-01-16 RX ORDER — FENTANYL CITRATE 50 UG/ML
12.5-5 INJECTION, SOLUTION INTRAMUSCULAR; INTRAVENOUS
Status: DISCONTINUED | OUTPATIENT
Start: 2019-01-16 | End: 2019-01-16 | Stop reason: HOSPADM

## 2019-01-16 RX ADMIN — MIDAZOLAM HYDROCHLORIDE 0.5 MG: 2 INJECTION, SOLUTION INTRAMUSCULAR; INTRAVENOUS at 12:00

## 2019-01-16 RX ADMIN — CEFAZOLIN SODIUM 2 G: 2 SOLUTION INTRAVENOUS at 11:15

## 2019-01-16 RX ADMIN — MIDAZOLAM HYDROCHLORIDE 1 MG: 2 INJECTION, SOLUTION INTRAMUSCULAR; INTRAVENOUS at 11:15

## 2019-01-16 RX ADMIN — FENTANYL CITRATE 25 MCG: 50 INJECTION INTRAMUSCULAR; INTRAVENOUS at 12:00

## 2019-01-16 RX ADMIN — FENTANYL CITRATE 25 MCG: 50 INJECTION INTRAMUSCULAR; INTRAVENOUS at 11:25

## 2019-01-16 RX ADMIN — DIPHENHYDRAMINE HYDROCHLORIDE 25 MG: 50 INJECTION INTRAMUSCULAR; INTRAVENOUS at 11:30

## 2019-01-16 RX ADMIN — LIDOCAINE HYDROCHLORIDE 300 MG: 10; .005 INJECTION, SOLUTION EPIDURAL; INFILTRATION; INTRACAUDAL; PERINEURAL at 11:15

## 2019-01-16 RX ADMIN — HEPARIN SODIUM (PORCINE) LOCK FLUSH IV SOLN 100 UNIT/ML 500 UNITS: 100 SOLUTION at 11:50

## 2019-01-16 RX ADMIN — FENTANYL CITRATE 50 MCG: 50 INJECTION INTRAMUSCULAR; INTRAVENOUS at 11:15

## 2019-01-16 RX ADMIN — MIDAZOLAM HYDROCHLORIDE 0.5 MG: 2 INJECTION, SOLUTION INTRAMUSCULAR; INTRAVENOUS at 11:20

## 2019-01-16 NOTE — PROCEDURES
Interventional Radiology Brief Procedure Note    Patient: Abigail Ortiz MRN: [id number]  SSN: [id number]    YOB: 1960  Age: 62 y.o. Sex: female      Date of Procedure: 1/16/2019    Procedure(s): Mediport removal    Performed By: BEKA Wood    Anesthesia: Moderate Sedation    Estimated Blood Loss: Less than 10ml    Specimens: None    Findings:     - Written and verbal informed consent was obtained. - Time Out was performed. - Permanent image storage performed on PACS. - Image-guided left chest Mediport removal performed using maximum barrier precautions and sterile technique. - Please refer to report on PACS for full details. Follow Up: To be seen in follow-up with IR in 7-10 days for Mediport removal site check.      Signed By: Reynaldo Wood     January 16, 2019

## 2019-01-16 NOTE — H&P
OUTPATIENT HISTORY AND PHYSICAL      Today 1/16/2019     Indication/Symptoms:   Nelson Montes De Oca is a 62 y.o. female with a history of right breast cancer who presents for an image-guided removal of left chest mediport and new placement with moderate sedation. Patient has been NPO since midnight and blood thinner held since Friday. Current Meds:    Prior to Admission medications    Medication Sig Start Date End Date Taking? Authorizing Provider   famotidine (PEPCID) 40 mg tablet Take 1 Tab by mouth two (2) times a day. 11/21/18   Jd Hooper, [facility]   potassium chloride SA (MICRO-K) 10 mEq capsule 10 mEq daily. 8/18/18   Provider, Historical   docusate sodium (COLACE) 100 mg capsule Take 1 Cap by mouth two (2) times a day for 90 days. 10/19/18 1/17/19  Jose Antonio Hoffmann MD   oxyCODONE-acetaminophen (PERCOCET) 5-325 mg per tablet Take 1 Tab by mouth every six (6) hours as needed for Pain. Max Daily Amount: 4 Tabs. 10/19/18   Jose Antonio Hoffmann MD       Allergies:       Allergies   Allergen Reactions    Shellfish Derived Swelling       Comorbid Conditions:    Past Medical History:   Diagnosis Date    Cancer ([facility] [address]     right breast    Hepatitis B     S/P chemotherapy, time since 4-12 weeks     Tuberculosis 1980    Wrist fracture, right 10/13/2018          Past Surgical History:   Procedure Laterality Date    BX/REMV,LYMPH NODE,DEEP AXILL Right 10/19/2018    Dr. Can Boyle HX GYN      hysterectomy     HX MASTECTOMY Right 10/19/2018    RIGHT BREAST NEEDLE LOCALIZED LUMPECTOMY AND SENTINEL LYMPH NODE (LOC 0830; NUC 10.18.18 @ 0900) performed by Jose Antonio Hoffmann MD at [address] [place] [address] HX OTHER SURGICAL      neck surgery     [place] INSJ [facility] CTR VAD W/SUBQ PORT AGE 5 YR/> N/A 03/08/2018    Dr. Morgan Cease     Data:    Visit Vitals  /72 (BP 1 Location: Right arm, BP Patient Position: Supine)   Pulse 67   Resp 16   Ht 5' 5\" (1.651 m)   Wt 53.5 kg (118 lb)   SpO2 100%   BMI 19.64 kg/m²   :  Recent Labs 01/16/19  1000        Recent Labs     01/16/19  1000   INR 1.0   APTT 28.4       The H & P and/or progress notes and any available imaging were reviewed. The risks, indications and possible alternatives to the procedure, including doing nothing, were discussed and informed consent was obtained. Physical Exam:      Mental status:   Alert and oriented. Examination specific to the procedure proposed to be performed and any co morbid conditions:   Mallampati classification 1 ,  ASA 3   Heart:   Regular rate. Lungs:   Normal respiratory effort. Symmetrical rise and fall of chest    The patient is an appropriate candidate to undergo the planned procedure and sedation.     Elieser [place], [place]

## 2019-01-16 NOTE — DISCHARGE INSTRUCTIONS
[facility] DRAIN/PORT/CATHETER REMOVAL  DISCHARGE INSTRUCTIONS    General Information:     Your doctor has ordered for us to remove your drain, port, or catheter. This could be that you do not need it anymore, it is not doing its job, your physician has decided on another plan for your treatment and/or it may need replacing. Home Care Instructions: You can resume your regular diet and medication regimen. Do not drink alcohol, drive, or make any important legal decisions in the next 24 hours. Do not lift anything heavier than a gallon of milk, or do anything strenuous for the next 24 hours. You will notice a dressing over the site of the removal. This dressing should stay in place until the site is healed. The dressing should be changed at least every 48 hours. You should change the dressing sooner if it becomes soiled or wet. The nurse who discharges you to home should review with you any wound care instructions. Resume your normal level of activity slowly. You may shower after 24 hours, but do not take a bath, swim or immerse yourself in water until the site has healed, and keep the dressing dry with plastic wrap while showering. The site may ooze for a couple of days. This drainage should lessen with each passing day. Call If:     You should call your Physician and/or the Radiology Nurse if you have any bleeding other than a small spot on your bandage. Call if you have any signs of infection, fever, or increased pain at the site of the tube. Call if the oozing increases, if it changes color, or begins to have an odor. Follow-Up Instructions: Please see your ordering doctor as he/she has requested. Implanted Port Discharge Instructions      General Instructions:   A port is like an implanted IV.  They are usually ordered for patients who will be getting chemotherapy, but can also be used as an IV for long term antibiotics, large amounts of fluids, and/or blood products. Your blood can be drawn from your port for labs also. Those patients who do not have good veins find the ports convenient as they can get the IV they need with one stick. The port can be used long term, and the care is easy. The device is under the skin, and once the skin heals, care is minimal. All that is required is the nurse who accesses the port will need to flush it with heparinized saline after each use. Ports are usually placed in the chest wall, usually on the right side. But they can be place in the arms and in the abdomen. Home Care Instructions: If your port is in your arm, do not allow blood pressure or other IVs to be place in that arm. Do not allow bra straps or any clothing to rub the skin over the port. Do not bathe or swim until the skin has healed and if the port is accessed. Once it is healed, and when the port is not accessed, it is okay to bathe and swim. Restrict yourself to light activity for the first 5 days after getting the port put in, after that, resume normal activity slowly. You may resume your normal diet and medications. Follow-Up Instructions: Please see your oncologist, or whatever physician ordered the port as he/she has requested of you. Call If: You should call your Physician and/or the Radiology Nurse if you notice redness, pus, swelling, or pain from the area of your incision. Call if you should develop a fever. The nurses who access your port will know to call your doctor if the port does not seem to be working properly. You need to tell the nurses who use the port if you should have any pain or swelling at the site during an infusion.     To Reach Us:       Patient Signature:  Date: 1/16/2019  Discharging Nurse: Carolina De Leon RN

## 2019-01-16 NOTE — PROGRESS NOTES
TRANSFER - OUT REPORT:    Verbal report given to Keegan RN(name) on Juany Reilly  being transferred to [facility](unit) for routine post - op       Report consisted of patients Situation, Background, Assessment and   Recommendations(SBAR). Information from the following report(s) SBAR, Kardex, Procedure Summary, MAR and Recent Results was reviewed with the receiving nurse. Lines:   Venous Access Device Left Cephalic Vein Mediport  06/42/48 Upper chest (subclavicular area), left (Active)       Peripheral IV 01/16/19 Left Wrist (Active)   Site Assessment Clean, dry, & intact 1/16/2019 10:15 AM   Phlebitis Assessment 0 1/16/2019 10:15 AM   Infiltration Assessment 0 1/16/2019 10:15 AM   Dressing Status Clean, dry, & intact 1/16/2019 10:15 AM        Opportunity for questions and clarification was provided.       Patient transported with:   Registered Nurse

## 2019-01-16 NOTE — PROCEDURES
Interventional Radiology Brief Post Procedure Note     Procedure Performed: Mediport     : Dell Finley PA-C     Assistant: None    Attending: Dr. Desmond Watson     Pre-operative Diagnosis: Left-sided breast cancer requiring chemotherapy treatment     Post-operative Diagnosis: Same      Anesthesia: 1% lidocaine and IV moderate sedation with Versed and Fentanyl administered and monitored by qualified nursing staff.      Findings:    - Written and verbal informed consent was obtained. - Time Out was performed. - Permanent image storage performed on PACS. - Image-guided right chest single lumen 6Fr Mediport placement performed using maximum barrier precautions and sterile technique.    - Please refer to report on PACS for full details.      Specimens: None     Complications: No immediate     Estimated Blood Loss:  Minimal     Blood transfusions:  None.     Implants: Please see PACS report.      Condition: Stable      Disposition: Nursing recovery unit      Impression: Successful right chest Mediport placement     Dell Finley [facility]

## 2019-01-22 RX ORDER — ACETAMINOPHEN 325 MG/1
650 TABLET ORAL
Status: CANCELLED | OUTPATIENT
Start: 2019-07-23

## 2019-01-22 RX ORDER — HYDROCORTISONE SODIUM SUCCINATE 100 MG/2ML
100 INJECTION, POWDER, FOR SOLUTION INTRAMUSCULAR; INTRAVENOUS AS NEEDED
Status: CANCELLED | OUTPATIENT
Start: 2019-07-02

## 2019-01-22 RX ORDER — DIPHENHYDRAMINE HYDROCHLORIDE 50 MG/ML
50 INJECTION, SOLUTION INTRAMUSCULAR; INTRAVENOUS AS NEEDED
Status: CANCELLED
Start: 2019-02-26

## 2019-01-22 RX ORDER — ACETAMINOPHEN 325 MG/1
650 TABLET ORAL
Status: CANCELLED | OUTPATIENT
Start: 2019-01-28

## 2019-01-22 RX ORDER — ACETAMINOPHEN 325 MG/1
650 TABLET ORAL AS NEEDED
Status: CANCELLED
Start: 2019-01-28

## 2019-01-22 RX ORDER — HYDROCORTISONE SODIUM SUCCINATE 100 MG/2ML
100 INJECTION, POWDER, FOR SOLUTION INTRAMUSCULAR; INTRAVENOUS AS NEEDED
Status: CANCELLED | OUTPATIENT
Start: 2019-02-26

## 2019-01-22 RX ORDER — HEPARIN 100 UNIT/ML
300-500 SYRINGE INTRAVENOUS AS NEEDED
Status: CANCELLED
Start: 2019-04-09

## 2019-01-22 RX ORDER — ALBUTEROL SULFATE 0.83 MG/ML
2.5 SOLUTION RESPIRATORY (INHALATION) AS NEEDED
Status: CANCELLED
Start: 2019-07-23

## 2019-01-22 RX ORDER — ONDANSETRON 2 MG/ML
8 INJECTION INTRAMUSCULAR; INTRAVENOUS AS NEEDED
Status: CANCELLED | OUTPATIENT
Start: 2019-05-21

## 2019-01-22 RX ORDER — DIPHENHYDRAMINE HYDROCHLORIDE 50 MG/ML
50 INJECTION, SOLUTION INTRAMUSCULAR; INTRAVENOUS AS NEEDED
Status: CANCELLED
Start: 2019-07-02

## 2019-01-22 RX ORDER — DIPHENHYDRAMINE HYDROCHLORIDE 50 MG/ML
50 INJECTION, SOLUTION INTRAMUSCULAR; INTRAVENOUS
Status: CANCELLED | OUTPATIENT
Start: 2019-01-28

## 2019-01-22 RX ORDER — ONDANSETRON 2 MG/ML
8 INJECTION INTRAMUSCULAR; INTRAVENOUS AS NEEDED
Status: CANCELLED | OUTPATIENT
Start: 2019-03-19

## 2019-01-22 RX ORDER — SODIUM CHLORIDE 0.9 % (FLUSH) 0.9 %
10 SYRINGE (ML) INJECTION AS NEEDED
Status: CANCELLED
Start: 2019-07-02

## 2019-01-22 RX ORDER — HEPARIN 100 UNIT/ML
300-500 SYRINGE INTRAVENOUS AS NEEDED
Status: CANCELLED
Start: 2019-01-28

## 2019-01-22 RX ORDER — SODIUM CHLORIDE 0.9 % (FLUSH) 0.9 %
10 SYRINGE (ML) INJECTION AS NEEDED
Status: CANCELLED
Start: 2019-02-26

## 2019-01-22 RX ORDER — EPINEPHRINE 1 MG/ML
0.3 INJECTION, SOLUTION, CONCENTRATE INTRAVENOUS AS NEEDED
Status: CANCELLED | OUTPATIENT
Start: 2019-03-19

## 2019-01-22 RX ORDER — ONDANSETRON 2 MG/ML
8 INJECTION INTRAMUSCULAR; INTRAVENOUS AS NEEDED
Status: CANCELLED | OUTPATIENT
Start: 2019-02-26

## 2019-01-22 RX ORDER — SODIUM CHLORIDE 9 MG/ML
10 INJECTION INTRAMUSCULAR; INTRAVENOUS; SUBCUTANEOUS AS NEEDED
Status: CANCELLED | OUTPATIENT
Start: 2019-07-02

## 2019-01-22 RX ORDER — EPINEPHRINE 1 MG/ML
0.3 INJECTION, SOLUTION, CONCENTRATE INTRAVENOUS AS NEEDED
Status: CANCELLED | OUTPATIENT
Start: 2019-04-09

## 2019-01-22 RX ORDER — DIPHENHYDRAMINE HYDROCHLORIDE 50 MG/ML
50 INJECTION, SOLUTION INTRAMUSCULAR; INTRAVENOUS AS NEEDED
Status: CANCELLED
Start: 2019-07-23

## 2019-01-22 RX ORDER — ONDANSETRON 2 MG/ML
8 INJECTION INTRAMUSCULAR; INTRAVENOUS AS NEEDED
Status: CANCELLED | OUTPATIENT
Start: 2019-07-02

## 2019-01-22 RX ORDER — DIPHENHYDRAMINE HYDROCHLORIDE 50 MG/ML
50 INJECTION, SOLUTION INTRAMUSCULAR; INTRAVENOUS
Status: CANCELLED | OUTPATIENT
Start: 2019-07-23

## 2019-01-22 RX ORDER — SODIUM CHLORIDE 0.9 % (FLUSH) 0.9 %
10 SYRINGE (ML) INJECTION AS NEEDED
Status: CANCELLED
Start: 2019-03-19

## 2019-01-22 RX ORDER — DIPHENHYDRAMINE HYDROCHLORIDE 50 MG/ML
50 INJECTION, SOLUTION INTRAMUSCULAR; INTRAVENOUS
Status: CANCELLED | OUTPATIENT
Start: 2019-06-11

## 2019-01-22 RX ORDER — ACETAMINOPHEN 325 MG/1
650 TABLET ORAL
Status: CANCELLED | OUTPATIENT
Start: 2019-03-19

## 2019-01-22 RX ORDER — SODIUM CHLORIDE 0.9 % (FLUSH) 0.9 %
10 SYRINGE (ML) INJECTION AS NEEDED
Status: CANCELLED
Start: 2019-06-11

## 2019-01-22 RX ORDER — ACETAMINOPHEN 325 MG/1
650 TABLET ORAL
Status: CANCELLED | OUTPATIENT
Start: 2019-02-26

## 2019-01-22 RX ORDER — ALBUTEROL SULFATE 0.83 MG/ML
2.5 SOLUTION RESPIRATORY (INHALATION) AS NEEDED
Status: CANCELLED
Start: 2019-03-19

## 2019-01-22 RX ORDER — HYDROCORTISONE SODIUM SUCCINATE 100 MG/2ML
100 INJECTION, POWDER, FOR SOLUTION INTRAMUSCULAR; INTRAVENOUS AS NEEDED
Status: CANCELLED | OUTPATIENT
Start: 2019-06-11

## 2019-01-22 RX ORDER — SODIUM CHLORIDE 9 MG/ML
25 INJECTION, SOLUTION INTRAVENOUS CONTINUOUS
Status: CANCELLED | OUTPATIENT
Start: 2019-02-26

## 2019-01-22 RX ORDER — EPINEPHRINE 1 MG/ML
0.3 INJECTION, SOLUTION, CONCENTRATE INTRAVENOUS AS NEEDED
Status: CANCELLED | OUTPATIENT
Start: 2019-07-02

## 2019-01-22 RX ORDER — HYDROCORTISONE SODIUM SUCCINATE 100 MG/2ML
100 INJECTION, POWDER, FOR SOLUTION INTRAMUSCULAR; INTRAVENOUS AS NEEDED
Status: CANCELLED | OUTPATIENT
Start: 2019-04-09

## 2019-01-22 RX ORDER — HYDROCORTISONE SODIUM SUCCINATE 100 MG/2ML
100 INJECTION, POWDER, FOR SOLUTION INTRAMUSCULAR; INTRAVENOUS AS NEEDED
Status: CANCELLED | OUTPATIENT
Start: 2019-07-23

## 2019-01-22 RX ORDER — SODIUM CHLORIDE 9 MG/ML
10 INJECTION INTRAMUSCULAR; INTRAVENOUS; SUBCUTANEOUS AS NEEDED
Status: CANCELLED | OUTPATIENT
Start: 2019-05-21

## 2019-01-22 RX ORDER — ALBUTEROL SULFATE 0.83 MG/ML
2.5 SOLUTION RESPIRATORY (INHALATION) AS NEEDED
Status: CANCELLED
Start: 2019-02-26

## 2019-01-22 RX ORDER — DIPHENHYDRAMINE HYDROCHLORIDE 50 MG/ML
50 INJECTION, SOLUTION INTRAMUSCULAR; INTRAVENOUS AS NEEDED
Status: CANCELLED
Start: 2019-06-11

## 2019-01-22 RX ORDER — DIPHENHYDRAMINE HYDROCHLORIDE 50 MG/ML
50 INJECTION, SOLUTION INTRAMUSCULAR; INTRAVENOUS AS NEEDED
Status: CANCELLED
Start: 2019-05-21

## 2019-01-22 RX ORDER — SODIUM CHLORIDE 9 MG/ML
10 INJECTION INTRAMUSCULAR; INTRAVENOUS; SUBCUTANEOUS AS NEEDED
Status: CANCELLED | OUTPATIENT
Start: 2019-01-28

## 2019-01-22 RX ORDER — SODIUM CHLORIDE 9 MG/ML
10 INJECTION INTRAMUSCULAR; INTRAVENOUS; SUBCUTANEOUS AS NEEDED
Status: CANCELLED | OUTPATIENT
Start: 2019-03-19

## 2019-01-22 RX ORDER — DIPHENHYDRAMINE HYDROCHLORIDE 50 MG/ML
50 INJECTION, SOLUTION INTRAMUSCULAR; INTRAVENOUS
Status: CANCELLED | OUTPATIENT
Start: 2019-05-21

## 2019-01-22 RX ORDER — ONDANSETRON 2 MG/ML
8 INJECTION INTRAMUSCULAR; INTRAVENOUS AS NEEDED
Status: CANCELLED | OUTPATIENT
Start: 2019-07-23

## 2019-01-22 RX ORDER — ALBUTEROL SULFATE 0.83 MG/ML
2.5 SOLUTION RESPIRATORY (INHALATION) AS NEEDED
Status: CANCELLED
Start: 2019-01-28

## 2019-01-22 RX ORDER — DIPHENHYDRAMINE HYDROCHLORIDE 50 MG/ML
50 INJECTION, SOLUTION INTRAMUSCULAR; INTRAVENOUS
Status: CANCELLED | OUTPATIENT
Start: 2019-04-09

## 2019-01-22 RX ORDER — SODIUM CHLORIDE 9 MG/ML
10 INJECTION INTRAMUSCULAR; INTRAVENOUS; SUBCUTANEOUS AS NEEDED
Status: CANCELLED | OUTPATIENT
Start: 2019-02-26

## 2019-01-22 RX ORDER — ALBUTEROL SULFATE 0.83 MG/ML
2.5 SOLUTION RESPIRATORY (INHALATION) AS NEEDED
Status: CANCELLED
Start: 2019-05-21

## 2019-01-22 RX ORDER — ACETAMINOPHEN 325 MG/1
650 TABLET ORAL
Status: CANCELLED | OUTPATIENT
Start: 2019-07-02

## 2019-01-22 RX ORDER — HEPARIN 100 UNIT/ML
300-500 SYRINGE INTRAVENOUS AS NEEDED
Status: CANCELLED
Start: 2019-02-26

## 2019-01-22 RX ORDER — SODIUM CHLORIDE 9 MG/ML
25 INJECTION, SOLUTION INTRAVENOUS CONTINUOUS
Status: CANCELLED | OUTPATIENT
Start: 2019-07-02

## 2019-01-22 RX ORDER — EPINEPHRINE 1 MG/ML
0.3 INJECTION, SOLUTION, CONCENTRATE INTRAVENOUS AS NEEDED
Status: CANCELLED | OUTPATIENT
Start: 2019-07-23

## 2019-01-22 RX ORDER — ONDANSETRON 2 MG/ML
8 INJECTION INTRAMUSCULAR; INTRAVENOUS AS NEEDED
Status: CANCELLED | OUTPATIENT
Start: 2019-04-09

## 2019-01-22 RX ORDER — DIPHENHYDRAMINE HYDROCHLORIDE 50 MG/ML
50 INJECTION, SOLUTION INTRAMUSCULAR; INTRAVENOUS AS NEEDED
Status: CANCELLED
Start: 2019-03-19

## 2019-01-22 RX ORDER — DIPHENHYDRAMINE HYDROCHLORIDE 50 MG/ML
50 INJECTION, SOLUTION INTRAMUSCULAR; INTRAVENOUS AS NEEDED
Status: CANCELLED
Start: 2019-04-09

## 2019-01-22 RX ORDER — SODIUM CHLORIDE 9 MG/ML
25 INJECTION, SOLUTION INTRAVENOUS CONTINUOUS
Status: CANCELLED | OUTPATIENT
Start: 2019-07-23

## 2019-01-22 RX ORDER — ACETAMINOPHEN 325 MG/1
650 TABLET ORAL AS NEEDED
Status: CANCELLED
Start: 2019-07-23

## 2019-01-22 RX ORDER — HEPARIN 100 UNIT/ML
300-500 SYRINGE INTRAVENOUS AS NEEDED
Status: CANCELLED
Start: 2019-07-23

## 2019-01-22 RX ORDER — ALBUTEROL SULFATE 0.83 MG/ML
2.5 SOLUTION RESPIRATORY (INHALATION) AS NEEDED
Status: CANCELLED
Start: 2019-07-02

## 2019-01-22 RX ORDER — EPINEPHRINE 1 MG/ML
0.3 INJECTION, SOLUTION, CONCENTRATE INTRAVENOUS AS NEEDED
Status: CANCELLED | OUTPATIENT
Start: 2019-01-28

## 2019-01-22 RX ORDER — EPINEPHRINE 1 MG/ML
0.3 INJECTION, SOLUTION, CONCENTRATE INTRAVENOUS AS NEEDED
Status: CANCELLED | OUTPATIENT
Start: 2019-06-11

## 2019-01-22 RX ORDER — EPINEPHRINE 1 MG/ML
0.3 INJECTION, SOLUTION, CONCENTRATE INTRAVENOUS AS NEEDED
Status: CANCELLED | OUTPATIENT
Start: 2019-05-21

## 2019-01-22 RX ORDER — SODIUM CHLORIDE 9 MG/ML
10 INJECTION INTRAMUSCULAR; INTRAVENOUS; SUBCUTANEOUS AS NEEDED
Status: CANCELLED | OUTPATIENT
Start: 2019-07-23

## 2019-01-22 RX ORDER — HEPARIN 100 UNIT/ML
300-500 SYRINGE INTRAVENOUS AS NEEDED
Status: CANCELLED
Start: 2019-03-19

## 2019-01-22 RX ORDER — HYDROCORTISONE SODIUM SUCCINATE 100 MG/2ML
100 INJECTION, POWDER, FOR SOLUTION INTRAMUSCULAR; INTRAVENOUS AS NEEDED
Status: CANCELLED | OUTPATIENT
Start: 2019-01-28

## 2019-01-22 RX ORDER — HEPARIN 100 UNIT/ML
300-500 SYRINGE INTRAVENOUS AS NEEDED
Status: CANCELLED
Start: 2019-07-02

## 2019-01-22 RX ORDER — ACETAMINOPHEN 325 MG/1
650 TABLET ORAL AS NEEDED
Status: CANCELLED
Start: 2019-03-19

## 2019-01-22 RX ORDER — SODIUM CHLORIDE 9 MG/ML
10 INJECTION INTRAMUSCULAR; INTRAVENOUS; SUBCUTANEOUS AS NEEDED
Status: CANCELLED | OUTPATIENT
Start: 2019-04-09

## 2019-01-22 RX ORDER — DIPHENHYDRAMINE HYDROCHLORIDE 50 MG/ML
50 INJECTION, SOLUTION INTRAMUSCULAR; INTRAVENOUS
Status: CANCELLED | OUTPATIENT
Start: 2019-07-02

## 2019-01-22 RX ORDER — SODIUM CHLORIDE 9 MG/ML
10 INJECTION INTRAMUSCULAR; INTRAVENOUS; SUBCUTANEOUS AS NEEDED
Status: CANCELLED | OUTPATIENT
Start: 2019-06-11

## 2019-01-22 RX ORDER — SODIUM CHLORIDE 0.9 % (FLUSH) 0.9 %
10 SYRINGE (ML) INJECTION AS NEEDED
Status: CANCELLED
Start: 2019-05-21

## 2019-01-22 RX ORDER — SODIUM CHLORIDE 0.9 % (FLUSH) 0.9 %
10 SYRINGE (ML) INJECTION AS NEEDED
Status: CANCELLED
Start: 2019-01-28

## 2019-01-22 RX ORDER — HYDROCORTISONE SODIUM SUCCINATE 100 MG/2ML
100 INJECTION, POWDER, FOR SOLUTION INTRAMUSCULAR; INTRAVENOUS AS NEEDED
Status: CANCELLED | OUTPATIENT
Start: 2019-05-21

## 2019-01-22 RX ORDER — ACETAMINOPHEN 325 MG/1
650 TABLET ORAL
Status: CANCELLED | OUTPATIENT
Start: 2019-05-21

## 2019-01-22 RX ORDER — HEPARIN 100 UNIT/ML
300-500 SYRINGE INTRAVENOUS AS NEEDED
Status: CANCELLED
Start: 2019-05-21

## 2019-01-22 RX ORDER — HEPARIN 100 UNIT/ML
300-500 SYRINGE INTRAVENOUS AS NEEDED
Status: CANCELLED
Start: 2019-06-11

## 2019-01-22 RX ORDER — ALBUTEROL SULFATE 0.83 MG/ML
2.5 SOLUTION RESPIRATORY (INHALATION) AS NEEDED
Status: CANCELLED
Start: 2019-04-09

## 2019-01-22 RX ORDER — SODIUM CHLORIDE 9 MG/ML
25 INJECTION, SOLUTION INTRAVENOUS CONTINUOUS
Status: CANCELLED | OUTPATIENT
Start: 2019-03-19

## 2019-01-22 RX ORDER — SODIUM CHLORIDE 9 MG/ML
25 INJECTION, SOLUTION INTRAVENOUS CONTINUOUS
Status: CANCELLED | OUTPATIENT
Start: 2019-05-21

## 2019-01-22 RX ORDER — ACETAMINOPHEN 325 MG/1
650 TABLET ORAL
Status: CANCELLED | OUTPATIENT
Start: 2019-04-09

## 2019-01-22 RX ORDER — EPINEPHRINE 1 MG/ML
0.3 INJECTION, SOLUTION, CONCENTRATE INTRAVENOUS AS NEEDED
Status: CANCELLED | OUTPATIENT
Start: 2019-02-26

## 2019-01-22 RX ORDER — ACETAMINOPHEN 325 MG/1
650 TABLET ORAL AS NEEDED
Status: CANCELLED
Start: 2019-04-09

## 2019-01-22 RX ORDER — ACETAMINOPHEN 325 MG/1
650 TABLET ORAL AS NEEDED
Status: CANCELLED
Start: 2019-06-11

## 2019-01-22 RX ORDER — ALBUTEROL SULFATE 0.83 MG/ML
2.5 SOLUTION RESPIRATORY (INHALATION) AS NEEDED
Status: CANCELLED
Start: 2019-06-11

## 2019-01-22 RX ORDER — ACETAMINOPHEN 325 MG/1
650 TABLET ORAL
Status: CANCELLED | OUTPATIENT
Start: 2019-06-11

## 2019-01-22 RX ORDER — SODIUM CHLORIDE 0.9 % (FLUSH) 0.9 %
10 SYRINGE (ML) INJECTION AS NEEDED
Status: CANCELLED
Start: 2019-04-09

## 2019-01-22 RX ORDER — SODIUM CHLORIDE 9 MG/ML
25 INJECTION, SOLUTION INTRAVENOUS CONTINUOUS
Status: CANCELLED | OUTPATIENT
Start: 2019-06-11

## 2019-01-22 RX ORDER — SODIUM CHLORIDE 9 MG/ML
25 INJECTION, SOLUTION INTRAVENOUS CONTINUOUS
Status: CANCELLED | OUTPATIENT
Start: 2019-04-09

## 2019-01-22 RX ORDER — DIPHENHYDRAMINE HYDROCHLORIDE 50 MG/ML
50 INJECTION, SOLUTION INTRAMUSCULAR; INTRAVENOUS
Status: CANCELLED | OUTPATIENT
Start: 2019-02-26

## 2019-01-22 RX ORDER — ACETAMINOPHEN 325 MG/1
650 TABLET ORAL AS NEEDED
Status: CANCELLED
Start: 2019-07-02

## 2019-01-22 RX ORDER — ACETAMINOPHEN 325 MG/1
650 TABLET ORAL AS NEEDED
Status: CANCELLED
Start: 2019-05-21

## 2019-01-22 RX ORDER — ONDANSETRON 2 MG/ML
8 INJECTION INTRAMUSCULAR; INTRAVENOUS AS NEEDED
Status: CANCELLED | OUTPATIENT
Start: 2019-06-11

## 2019-01-22 RX ORDER — DIPHENHYDRAMINE HYDROCHLORIDE 50 MG/ML
50 INJECTION, SOLUTION INTRAMUSCULAR; INTRAVENOUS AS NEEDED
Status: CANCELLED
Start: 2019-01-28

## 2019-01-22 RX ORDER — ACETAMINOPHEN 325 MG/1
650 TABLET ORAL AS NEEDED
Status: CANCELLED
Start: 2019-02-26

## 2019-01-22 RX ORDER — SODIUM CHLORIDE 9 MG/ML
25 INJECTION, SOLUTION INTRAVENOUS CONTINUOUS
Status: CANCELLED | OUTPATIENT
Start: 2019-01-28

## 2019-01-22 RX ORDER — HYDROCORTISONE SODIUM SUCCINATE 100 MG/2ML
100 INJECTION, POWDER, FOR SOLUTION INTRAMUSCULAR; INTRAVENOUS AS NEEDED
Status: CANCELLED | OUTPATIENT
Start: 2019-03-19

## 2019-01-22 RX ORDER — ONDANSETRON 2 MG/ML
8 INJECTION INTRAMUSCULAR; INTRAVENOUS AS NEEDED
Status: CANCELLED | OUTPATIENT
Start: 2019-01-28

## 2019-01-22 RX ORDER — SODIUM CHLORIDE 0.9 % (FLUSH) 0.9 %
10 SYRINGE (ML) INJECTION AS NEEDED
Status: CANCELLED
Start: 2019-07-23

## 2019-01-22 RX ORDER — DIPHENHYDRAMINE HYDROCHLORIDE 50 MG/ML
50 INJECTION, SOLUTION INTRAMUSCULAR; INTRAVENOUS
Status: CANCELLED | OUTPATIENT
Start: 2019-03-19

## 2019-01-23 ENCOUNTER — OFFICE VISIT (OUTPATIENT)
Dept: ONCOLOGY | Age: 59
End: 2019-01-23

## 2019-01-23 ENCOUNTER — HOSPITAL ENCOUNTER (OUTPATIENT)
Dept: INTERVENTIONAL RADIOLOGY/VASCULAR | Age: 59
Discharge: HOME | End: 2019-01-23
Attending: PHYSICIAN ASSISTANT
Payer: COMMERCIAL

## 2019-01-23 VITALS
HEIGHT: 65 IN | OXYGEN SATURATION: 98 % | RESPIRATION RATE: 18 BRPM | TEMPERATURE: 96.8 F | HEART RATE: 66 BPM | SYSTOLIC BLOOD PRESSURE: 110 MMHG | WEIGHT: 119 LBS | DIASTOLIC BLOOD PRESSURE: 69 MMHG | BODY MASS INDEX: 19.83 KG/M2

## 2019-01-23 DIAGNOSIS — T45.1X5A ANTINEOPLASTIC CHEMOTHERAPY INDUCED ANEMIA: ICD-10-CM

## 2019-01-23 DIAGNOSIS — C50.411 MALIGNANT NEOPLASM OF UPPER-OUTER QUADRANT OF RIGHT BREAST IN FEMALE, ESTROGEN RECEPTOR NEGATIVE (HCC): Primary | ICD-10-CM

## 2019-01-23 DIAGNOSIS — Z17.1 MALIGNANT NEOPLASM OF UPPER-OUTER QUADRANT OF RIGHT BREAST IN FEMALE, ESTROGEN RECEPTOR NEGATIVE (HCC): Primary | ICD-10-CM

## 2019-01-23 DIAGNOSIS — K21.9 GASTROESOPHAGEAL REFLUX DISEASE WITHOUT ESOPHAGITIS: ICD-10-CM

## 2019-01-23 DIAGNOSIS — R53.83 FATIGUE DUE TO TREATMENT: ICD-10-CM

## 2019-01-23 DIAGNOSIS — D64.81 ANTINEOPLASTIC CHEMOTHERAPY INDUCED ANEMIA: ICD-10-CM

## 2019-01-23 PROCEDURE — 99211 OFF/OP EST MAY X REQ PHY/QHP: CPT

## 2019-01-23 NOTE — PROGRESS NOTES
Hematology/Oncology  Progress Note Name: [facility] Date: 2019 : 1960 PCP: Julio Cesar Clarke MD  
 
Ms. Ana Laird is a 62 y.o. -American woman with HER-2 positive invasive ductal adenocarcinoma the right breast 
 
Current therapy: Carboplatin, Taxotere, and Herceptin plus Perjeda. The patient completed 6 cycles of adjuvant therapy and is now completed her final surgical procedure. Subjective: Ms. Ana Laird is a 43-year-old -American woman who is currently receiving neoadjuvant systemic therapy for her HER-2 positive invasive ductal adenocarcinoma the right breast.  On 9/10/2018 she completed cycle 6 of her treatment. The patient reports that she tolerated the medication well. She did not experience any significant nausea or emesis. She has experienced a mild degree of fatigue. Today, she has no new or additional complaints to report. The patient has subsequently undergone surgery and informed me that there was no residual tumor at the surgical site. The patient was recently restarted on her Herceptin medication every 21 days and is tolerating it well. She was referred to the interventional radiologist and now has a new Mediport since her old Mediport had become nonfunctional. 
 
Past medical history, family history, and social history: these were reviewed and remains unchanged. Past Medical History:  
Diagnosis Date  Cancer ([address] right breast  
 Hepatitis B   
 S/P chemotherapy, time since 4-12 weeks 19 [facility]  Wrist fracture, right 10/13/2018 Past Surgical History:  
Procedure Laterality Date  BX/REMV,LYMPH NODE,DEEP AXILL Right 10/19/2018 Dr. Tessy Davis  HX GYN    
 hysterectomy  HX MASTECTOMY Right 10/19/2018 RIGHT BREAST NEEDLE LOCALIZED LUMPECTOMY AND SENTINEL LYMPH NODE (LOC 0830; NUC 10.18.18 @ 0900) performed by Jasen Mcmullen MD at [address] [place] [address] HX OTHER SURGICAL    
 neck surgery  IR INSERT TUNL CVC W PORT OVER 5 YEARS  1/16/2019  DC INSJ PRPH CTR VAD W/SUBQ PORT AGE 5 YR/> N/A 03/08/2018 Dr. Gemma Humphrey Social History Socioeconomic History  Marital status: SINGLE Spouse name: Not on file  Number of children: Not on file  Years of education: Not on file  Highest education level: Not on file Social Needs  Financial resource strain: Not on file  Food insecurity - worry: Not on file  Food insecurity - inability: Not on file  Transportation needs - medical: Not on file  Transportation needs - non-medical: Not on file Occupational History  Not on file Tobacco Use  Smoking status: Never Smoker  Smokeless tobacco: Current User Substance and Sexual Activity  Alcohol use: No  
  Alcohol/week: 1.2 oz Types: 2 Cans of beer per week Comment: weekly  Drug use: No  
 Sexual activity: No  
Other Topics Concern  Not on file Social History Narrative  Not on file Family History Problem Relation Age of Onset  Stroke Mother  Breast Cancer Mother  Heart Disease Father  Cancer Maternal Grandmother Review of Systems Constitutional: The patient has complaints of a mild degree of fatigue and some weakness following chemotherapy. She has a noticeable increase in her food intake due to increasing appetite following chemotherapy. HEENT: The patient denies recent head trauma, eye pain, blurred vision,  hearing deficit, oropharyngeal mucosal pain or lesions, and the patient denies throat pain or discomfort. Lymphatics: The patient denies palpable peripheral lymphadenopathy. Hematologic: The patient denies having bruising, bleeding, or progressive fatigue. Respiratory: Patient denies having shortness of breath, cough, sputum production, fever, or dyspnea on exertion. Cardiovascular:  The patient denies having leg pain, leg swelling, heart palpitations, chest permit, chest pain, or lightheadedness. The patient denies having dyspnea on exertion. Gastrointestinal: The patient denies having nausea, emesis, or diarrhea. The patient denies having any hematemesis or blood in the stool. Genitourinary: Patient denies having urinary urgency, frequency, or dysuria. The patient denies having blood in the urine. Psychological: The patient denies having symptoms of nervousness, anxiety, depression, or thoughts of harming self. Skin: Patient denies having skin rashes, skin, ulcerations, or unexplained itching or pruritus. Musculoskeletal: The patient denies having pain in the joints or bones. Objective:  
 
Visit Vitals /69 Pulse 66 Temp 96.8 °F (36 °C) (Oral) Resp 18 Ht 5' 5\" (1.651 m) Wt 54 kg (119 lb) SpO2 98% BMI 19.80 kg/m² ECOG PS=0 Physical Exam:  
Gen. Appearance: The patient is in no acute distress. Skin: There is no bruise or rash. HEENT: The exam is unremarkable. Neck: Supple without lymphadenopathy or thyromegaly. Lungs: Clear to auscultation and percussion; there are no wheezes or rhonchi. Heart: Regular rate and rhythm; there are no murmurs, gallops, or rubs. Anterior chest wall and breast: There is no change in the exam, except for the fact that she has a new MediPort on the right upper chest wall. The skin is intact with no evidence of inflammation or infection. .  Abdomen: Bowel sounds are present and normal.  There is no guarding, tenderness, or hepatosplenomegaly. Extremities: There is no clubbing, cyanosis, or edema. Neurologic: There are no focal neurologic deficits. Lymphatics: There is no palpable peripheral lymphadenopathy. Musculoskeletal: The patient has full range of motion at all joints. There is no evidence of joint deformity or effusions. There is no focal joint tenderness. Psychological/psychiatric: There is no clinical evidence of anxiety, depression, or melancholy. Lab data: Results for orders placed or performed during the hospital encounter of 01/07/19 CBC WITH 3 PART DIFF     Status: Abnormal  
Result Value Ref Range Status WBC 5.0 4.5 - 13.0 K/uL Final  
 RBC 3.62 (L) 4.10 - 5.10 M/uL Final  
 HGB 11.5 (L) 12.0 - 16 g/dL Final  
 HCT 34.3 (L) 36 - 48 % Final  
 MCV 94.8 78 - 102 FL Final  
 MCH 31.8 25.0 - 35.0 PG Final  
 MCHC 33.5 31 - 37 g/dL Final  
 RDW 14.3 11.5 - 14.5 % Final  
 PLATELET 320 526 - 068 K/uL Final  
 NEUTROPHILS 69 40 - 70 % Final  
 MIXED CELLS 5 0.1 - 17 % Final  
 LYMPHOCYTES 26 14 - 44 % Final  
 ABS. NEUTROPHILS 3.4 1.8 - 9.5 K/UL Final  
 ABS. MIXED CELLS 0.3 0.0 - 2.3 K/uL Final  
 ABS. LYMPHOCYTES 1.3 1.1 - 5.9 K/UL Final  
  Comment: Test performed at [address]. Results Reviewed by Medical Director. DF AUTOMATED   Final  
 
 
   
Assessment:  
 
1. Malignant neoplasm of upper-outer quadrant of right breast in female, estrogen receptor negative ([facility] [address] 2. Antineoplastic chemotherapy induced anemia 3. Fatigue due to treatment 4. Gastroesophageal reflux disease without esophagitis Plan:  
Invasive ductal adenocarcinoma right upper quadrant of the breast: The patient completed cycle 6 of her neoadjuvant treatment program.  She also has subsequently completed surgery. There was no residual tumor at the time of her surgery and the sentinel lymph node was not. Herceptin will continue every 3 weeks until she completes a full year of therapy. She was recently referred to the interventional radiologist to have a Mediport changed out. This was successfully completed. Chemotherapy-induced anemia: I will check her iron profile and ferritin levels at this time. I have instructed the patient to begin taking Slow Fe 1 tablet daily. She was previously started on Procrit 60,000 unit with her chemotherapy treatment.   Her most recent CBC from 1/7/2019 showed that her hemoglobin hematocrit have remained adequate and she has not required any further Procrit dosing. Fatigue due to treatment: pt will continue to stay well-hydrated particularly on the weeks of chemotherapy and get  Lots of rest. 
 
GERD: The patient was encouraged to continue taking Prilosec OTC. Follow-up in 6 weeks Orders Placed This Encounter  METABOLIC PANEL, COMPREHENSIVE Standing Status:   Future Standing Expiration Date:   1/24/2020  
 IRON PROFILE Standing Status:   Future Standing Expiration Date:   1/24/2020  FERRITIN Standing Status:   Future Standing Expiration Date:   1/24/2020  CA 27.29 Standing Status:   Future Standing Expiration Date:   1/24/2020 Linda Santos MD 
1/23/2019 Please note: This document has been produced using voice recognition software. Unrecognized errors in transcription may be present.

## 2019-01-23 NOTE — PATIENT INSTRUCTIONS

## 2019-01-25 ENCOUNTER — DOCUMENTATION ONLY (OUTPATIENT)
Dept: SURGERY | Age: 59
End: 2019-01-25

## 2019-01-25 ENCOUNTER — OFFICE VISIT (OUTPATIENT)
Dept: SURGERY | Age: 59
End: 2019-01-25

## 2019-01-25 VITALS
SYSTOLIC BLOOD PRESSURE: 96 MMHG | WEIGHT: 115 LBS | BODY MASS INDEX: 19.16 KG/M2 | DIASTOLIC BLOOD PRESSURE: 62 MMHG | RESPIRATION RATE: 18 BRPM | OXYGEN SATURATION: 96 % | HEIGHT: 65 IN | TEMPERATURE: 97.4 F | HEART RATE: 80 BPM

## 2019-01-25 DIAGNOSIS — Z85.3 HISTORY OF RIGHT BREAST CANCER: Primary | ICD-10-CM

## 2019-01-25 RX ORDER — FAMOTIDINE 40 MG/1
40 TABLET, FILM COATED ORAL DAILY
COMMUNITY
End: 2022-05-31

## 2019-01-25 RX ORDER — DOCUSATE SODIUM 100 MG
100 CAPSULE ORAL DAILY
Refills: 2 | COMMUNITY
Start: 2018-10-19 | End: 2022-05-31

## 2019-01-25 NOTE — PROGRESS NOTES
Chief Complaint   Patient presents with    Follow-up     hx right breast invasive ductal carcinoma SUSAN 2 positive s/p lumpectomy      1. Have you been to the ER, urgent care clinic since your last visit? Hospitalized since your last visit? Yes When: Jan 2019 Where: [facility] Reason for visit: breast drains backed up    2. Have you seen or consulted any other health care providers outside of the [address] [place] [address] since your last visit? Include any pap smears or colon screening.  I do not know

## 2019-01-25 NOTE — PROGRESS NOTES
[facility]  General Surgery    Subjective:  Patient returns today 4 months since her last visit in October without complaints. She denies any unintentional weight loss or breast pain or nipple drainage or discharge. Objective:  Vitals:    19 1514   BP: 96/62   Pulse: 80   Resp: 18   Temp: 97.4 °F (36.3 °C)   TempSrc: Oral   SpO2: 96%   Weight: 52.2 kg (115 lb)   Height: 5' 5\" (1.651 m)       Physical Exam:    General: Awake and alert, oriented x4, no apparent distress   Breasts:    Left: No dimpling, discoloration, nipple inversion or retractions. No axillary or supraclavicular lymphadenopathy. No mass   Right: No dimpling, discoloration, nipple inversion or retractions. No axillary or supraclavicular lymphadenopathy. No mass    Current Medications:  Current Outpatient Medications   Medication Sig Dispense Refill    STOOL SOFTENER 100 mg capsule Take 100 mg by mouth daily. 2    famotidine (PEPCID) 40 mg tablet Take 40 mg by mouth daily. Chart and notes reviewed. Data reviewed. I have evaluated and examined the patient. Impression and plan:  Patient with history of right breast cancer status post neoadjuvant chemotherapy with Herceptin  stage I right breast cancer. We will refer to Dr. Avery Rock for radiation therapy. Continue monthly self breast exam  Please call a visit for any questions or concerns  Follow-up for clinical breast exam in 4 months. Patient is due for bilateral screening 3D mammography.   We will call the patient to discuss the results at this is completed the mammogram.     Maxx Albert MD

## 2019-01-28 ENCOUNTER — HOSPITAL ENCOUNTER (OUTPATIENT)
Dept: INFUSION THERAPY | Age: 59
Discharge: HOME | End: 2019-01-28
Payer: COMMERCIAL

## 2019-01-28 VITALS
RESPIRATION RATE: 18 BRPM | HEIGHT: 65 IN | BODY MASS INDEX: 19.76 KG/M2 | DIASTOLIC BLOOD PRESSURE: 60 MMHG | SYSTOLIC BLOOD PRESSURE: 139 MMHG | WEIGHT: 118.6 LBS | TEMPERATURE: 97.7 F | OXYGEN SATURATION: 100 % | HEART RATE: 56 BPM

## 2019-01-28 DIAGNOSIS — Z17.1 MALIGNANT NEOPLASM OF UPPER-OUTER QUADRANT OF RIGHT BREAST IN FEMALE, ESTROGEN RECEPTOR NEGATIVE (HCC): Primary | ICD-10-CM

## 2019-01-28 DIAGNOSIS — C50.411 MALIGNANT NEOPLASM OF UPPER-OUTER QUADRANT OF RIGHT BREAST IN FEMALE, ESTROGEN RECEPTOR NEGATIVE (HCC): Primary | ICD-10-CM

## 2019-01-28 LAB
ALBUMIN SERPL-MCNC: 3.5 G/DL (ref 3.4–5)
ALBUMIN/GLOB SERPL: 0.9 {RATIO} (ref 0.8–1.7)
ALP SERPL-CCNC: 92 U/L (ref 45–117)
ALT SERPL-CCNC: 15 U/L (ref 13–56)
ANION GAP SERPL CALC-SCNC: 6 MMOL/L (ref 3–18)
AST SERPL-CCNC: 14 U/L (ref 15–37)
BASO+EOS+MONOS # BLD AUTO: 0.3 K/UL (ref 0–2.3)
BASO+EOS+MONOS NFR BLD AUTO: 7 % (ref 0.1–17)
BILIRUB SERPL-MCNC: 0.3 MG/DL (ref 0.2–1)
BUN SERPL-MCNC: 16 MG/DL (ref 7–18)
BUN/CREAT SERPL: 24 (ref 12–20)
CALCIUM SERPL-MCNC: 8.6 MG/DL (ref 8.5–10.1)
CHLORIDE SERPL-SCNC: 108 MMOL/L (ref 100–108)
CO2 SERPL-SCNC: 26 MMOL/L (ref 21–32)
CREAT SERPL-MCNC: 0.66 MG/DL (ref 0.6–1.3)
DIFFERENTIAL METHOD BLD: ABNORMAL
ERYTHROCYTE [DISTWIDTH] IN BLOOD BY AUTOMATED COUNT: 13.4 % (ref 11.5–14.5)
GLOBULIN SER CALC-MCNC: 3.7 G/DL (ref 2–4)
GLUCOSE SERPL-MCNC: 77 MG/DL (ref 74–99)
HCT VFR BLD AUTO: 34.1 % (ref 36–48)
HGB BLD-MCNC: 11.2 G/DL (ref 12–16)
LYMPHOCYTES # BLD: 0.6 K/UL (ref 1.1–5.9)
LYMPHOCYTES NFR BLD: 12 % (ref 14–44)
MCH RBC QN AUTO: 30.7 PG (ref 25–35)
MCHC RBC AUTO-ENTMCNC: 32.8 G/DL (ref 31–37)
MCV RBC AUTO: 93.4 FL (ref 78–102)
NEUTS SEG # BLD: 4.1 K/UL (ref 1.8–9.5)
NEUTS SEG NFR BLD: 82 % (ref 40–70)
PLATELET # BLD AUTO: 150 K/UL (ref 140–440)
POTASSIUM SERPL-SCNC: 3.7 MMOL/L (ref 3.5–5.5)
PROT SERPL-MCNC: 7.2 G/DL (ref 6.4–8.2)
RBC # BLD AUTO: 3.65 M/UL (ref 4.1–5.1)
SODIUM SERPL-SCNC: 140 MMOL/L (ref 136–145)
WBC # BLD AUTO: 5 K/UL (ref 4.5–13)

## 2019-01-28 PROCEDURE — 80053 COMPREHEN METABOLIC PANEL: CPT

## 2019-01-28 PROCEDURE — 74011250636 HC RX REV CODE- 250/636: Performed by: INTERNAL MEDICINE

## 2019-01-28 PROCEDURE — 74011250636 HC RX REV CODE- 250/636

## 2019-01-28 PROCEDURE — 96413 CHEMO IV INFUSION 1 HR: CPT

## 2019-01-28 PROCEDURE — 77030012965 HC NDL HUBR BBMI -A

## 2019-01-28 PROCEDURE — 85025 COMPLETE CBC W/AUTO DIFF WBC: CPT

## 2019-01-28 RX ORDER — DIPHENHYDRAMINE HYDROCHLORIDE 50 MG/ML
50 INJECTION, SOLUTION INTRAMUSCULAR; INTRAVENOUS
Status: ACTIVE | OUTPATIENT
Start: 2019-01-28 | End: 2019-01-29

## 2019-01-28 RX ORDER — ACETAMINOPHEN 325 MG/1
650 TABLET ORAL
Status: ACTIVE | OUTPATIENT
Start: 2019-01-28 | End: 2019-01-29

## 2019-01-28 RX ORDER — SODIUM CHLORIDE 0.9 % (FLUSH) 0.9 %
10-40 SYRINGE (ML) INJECTION AS NEEDED
Status: DISPENSED | OUTPATIENT
Start: 2019-01-28 | End: 2019-01-28

## 2019-01-28 RX ORDER — SODIUM CHLORIDE 9 MG/ML
10 INJECTION INTRAMUSCULAR; INTRAVENOUS; SUBCUTANEOUS AS NEEDED
Status: ACTIVE | OUTPATIENT
Start: 2019-01-28 | End: 2019-01-28

## 2019-01-28 RX ORDER — HEPARIN 100 UNIT/ML
300-500 SYRINGE INTRAVENOUS AS NEEDED
Status: DISPENSED | OUTPATIENT
Start: 2019-01-28 | End: 2019-01-28

## 2019-01-28 RX ORDER — SODIUM CHLORIDE 9 MG/ML
25 INJECTION, SOLUTION INTRAVENOUS CONTINUOUS
Status: DISPENSED | OUTPATIENT
Start: 2019-01-28 | End: 2019-01-28

## 2019-01-28 RX ADMIN — TRASTUZUMAB 330 MG: 150 INJECTION, POWDER, LYOPHILIZED, FOR SOLUTION INTRAVENOUS at 09:58

## 2019-01-28 RX ADMIN — Medication 20 ML: at 09:07

## 2019-01-28 RX ADMIN — Medication 10 ML: at 10:32

## 2019-01-28 RX ADMIN — SODIUM CHLORIDE, PRESERVATIVE FREE 500 UNITS: 5 INJECTION INTRAVENOUS at 10:32

## 2019-01-28 NOTE — PROGRESS NOTES
Met w/ patient prior to her office visit with Dr. Toño Hernandez today. The patients' [address] was given to and reviewed w/ the patient. All questions answered. Gave pt breast cancer support group flyer and encouraged her to attend. Gave patient survivorship nurse navigator's contact info for questions or concerns.

## 2019-01-28 NOTE — PROGRESS NOTES
[address] [facility] Progress Note    Date: 2019    Name: Preet Iglesias    MRN: [id number]         : 1960     Herceptin C9      Ms. Alana Garcia arrived in the [facility] today at [phone], in stable condition, here for Cycle 10, IV Herceptin (Trastuzumab) Infusion & Q 3 Week, CBC/Procrit injection. She was assessed and education was provided. Ms. Ventura's vitals were reviewed. Visit Vitals  /60 (BP 1 Location: Left arm, BP Patient Position: Sitting)   Pulse (!) 56   Temp 97.7 °F (36.5 °C)   Resp 18   Ht 5' 5\" (1.651 m)   Wt 53.8 kg (118 lb 9.6 oz)   SpO2 100%   BMI 19.74 kg/m²       Patient had a new mediport placed a week ago she states. Right chest single lumen mediport accessed with 20 G 1 inch johnson using sterile technique. Postitive for blood return and flushes without difficulty. Labs drawn off for CBC and CMP per ordered. Chemo is not dependent on labs. Recent Results (from the past 12 hour(s))   CBC WITH 3 PART DIFF    Collection Time: 19  9:08 AM   Result Value Ref Range    WBC 5.0 4.5 - 13.0 K/uL    RBC 3.65 (L) 4.10 - 5.10 M/uL    HGB 11.2 (L) 12.0 - 16 g/dL    HCT 34.1 (L) 36 - 48 %    MCV 93.4 78 - 102 FL    MCH 30.7 25.0 - 35.0 PG    MCHC 32.8 31 - 37 g/dL    RDW 13.4 11.5 - 14.5 %    PLATELET 422 642 - 948 K/uL    NEUTROPHILS 82 (H) 40 - 70 %    MIXED CELLS 7 0.1 - 17 %    LYMPHOCYTES 12 (L) 14 - 44 %    ABS. NEUTROPHILS 4.1 1.8 - 9.5 K/UL    ABS. MIXED CELLS 0.3 0.0 - 2.3 K/uL    ABS. LYMPHOCYTES 0.6 (L) 1.1 - 5.9 K/UL    DF AUTOMATED         Results within parameters to HOLD procrit today. Herceptin (Trastuzumab) 330 mg IV (6 mg/kg) was administered via the peripheral IV, over approximately 30 minutes, per order, and without incident. After completion of the IV Herceptin, the PIV was flushed well with NS, and was removed and gauze/bandaid was applied. Ms. Alana Garcia tolerated well, and had no complaints.     Ms. Alana Garcia was discharged from [place] [address] in stable condition at 1035. She is to return in 3 weeks, on Monday, 2-18-19 at 0900, for her next appointment, for Cycle 11, IV Herceptin Infusion, and CBC/Procrit injection.      Chacha Tai RN  January 28, 2019

## 2019-02-18 ENCOUNTER — HOSPITAL ENCOUNTER (OUTPATIENT)
Dept: INFUSION THERAPY | Age: 59
End: 2019-02-18

## 2019-02-21 ENCOUNTER — HOSPITAL ENCOUNTER (OUTPATIENT)
Dept: RADIATION THERAPY | Age: 59
End: 2019-02-21

## 2019-02-21 NOTE — PROGRESS NOTES
Pharmacy Note     Name: Sharlette Mohs  : 1960  Estimated body surface area is 1.57 meters squared as calculated from the following:    Height as of 19: 165.1 cm (65\"). Weight as of 19: 53.8 kg (118 lb 9.6 oz). Diagnosis: breast cancer  Treatment Plan: Herceptin  Cycle: 11  Cycle Start Date: 19    Lab Results   Component Value Date/Time    WBC 5.0 2019 09:08 AM    PLATELET 724  09:08 AM     Lab Results   Component Value Date/Time    ABS. NEUTROPHILS 4.1 2019 09:08 AM     Lab Results   Component Value Date/Time    Creatinine, POC 0.7 09/10/2018 11:13 AM    Creatinine 0.66 2019 09:08 AM     Most Recent Creatinine Clearance:  CrCl: 81.7 mL/min (based on Adjusted Body Weight)  Creatinine: 0.66 mg/dL (2019)      Pharmacy Intervention:  · Patient missed regular Herceptin dose by more than 1 week  · Per Dr. Radha Morales, reload with 8 mg/kg (440 mg) for Cycle 11 only on 19, then resume 6 mg/mg dose every 3 weeks  · Treatment plan updated in [facility] for Cycle 11 only.     Pharmacist: Rajinder Carvajal [facility]

## 2019-02-26 ENCOUNTER — HOSPITAL ENCOUNTER (OUTPATIENT)
Dept: INFUSION THERAPY | Age: 59
Discharge: HOME | End: 2019-02-26
Payer: MEDICAID

## 2019-02-26 VITALS
WEIGHT: 115.1 LBS | TEMPERATURE: 98.7 F | HEIGHT: 65 IN | SYSTOLIC BLOOD PRESSURE: 144 MMHG | DIASTOLIC BLOOD PRESSURE: 82 MMHG | RESPIRATION RATE: 18 BRPM | HEART RATE: 77 BPM | BODY MASS INDEX: 19.18 KG/M2

## 2019-02-26 DIAGNOSIS — C50.411 MALIGNANT NEOPLASM OF UPPER-OUTER QUADRANT OF RIGHT BREAST IN FEMALE, ESTROGEN RECEPTOR NEGATIVE (HCC): Primary | ICD-10-CM

## 2019-02-26 DIAGNOSIS — Z17.1 MALIGNANT NEOPLASM OF UPPER-OUTER QUADRANT OF RIGHT BREAST IN FEMALE, ESTROGEN RECEPTOR NEGATIVE (HCC): Primary | ICD-10-CM

## 2019-02-26 LAB
ALBUMIN SERPL-MCNC: 3.5 G/DL (ref 3.4–5)
ALBUMIN/GLOB SERPL: 0.9 {RATIO} (ref 0.8–1.7)
ALP SERPL-CCNC: 92 U/L (ref 45–117)
ALT SERPL-CCNC: 18 U/L (ref 13–56)
ANION GAP SERPL CALC-SCNC: 5 MMOL/L (ref 3–18)
AST SERPL-CCNC: 15 U/L (ref 15–37)
BASO+EOS+MONOS # BLD AUTO: 0.3 K/UL (ref 0–2.3)
BASO+EOS+MONOS NFR BLD AUTO: 3 % (ref 0.1–17)
BILIRUB SERPL-MCNC: 0.3 MG/DL (ref 0.2–1)
BUN SERPL-MCNC: 17 MG/DL (ref 7–18)
BUN/CREAT SERPL: 24 (ref 12–20)
CALCIUM SERPL-MCNC: 8.7 MG/DL (ref 8.5–10.1)
CHLORIDE SERPL-SCNC: 105 MMOL/L (ref 100–108)
CO2 SERPL-SCNC: 28 MMOL/L (ref 21–32)
CREAT SERPL-MCNC: 0.71 MG/DL (ref 0.6–1.3)
DIFFERENTIAL METHOD BLD: ABNORMAL
ERYTHROCYTE [DISTWIDTH] IN BLOOD BY AUTOMATED COUNT: 14.7 % (ref 11.5–14.5)
GLOBULIN SER CALC-MCNC: 3.9 G/DL (ref 2–4)
GLUCOSE SERPL-MCNC: 81 MG/DL (ref 74–99)
HCT VFR BLD AUTO: 35.5 % (ref 36–48)
HGB BLD-MCNC: 11.6 G/DL (ref 12–16)
LYMPHOCYTES # BLD: 1 K/UL (ref 1.1–5.9)
LYMPHOCYTES NFR BLD: 11 % (ref 14–44)
MCH RBC QN AUTO: 30.4 PG (ref 25–35)
MCHC RBC AUTO-ENTMCNC: 32.7 G/DL (ref 31–37)
MCV RBC AUTO: 92.9 FL (ref 78–102)
NEUTS SEG # BLD: 8.1 K/UL (ref 1.8–9.5)
NEUTS SEG NFR BLD: 86 % (ref 40–70)
PLATELET # BLD AUTO: 185 K/UL (ref 140–440)
POTASSIUM SERPL-SCNC: 4.1 MMOL/L (ref 3.5–5.5)
PROT SERPL-MCNC: 7.4 G/DL (ref 6.4–8.2)
RBC # BLD AUTO: 3.82 M/UL (ref 4.1–5.1)
SODIUM SERPL-SCNC: 138 MMOL/L (ref 136–145)
WBC # BLD AUTO: 9.4 K/UL (ref 4.5–13)

## 2019-02-26 PROCEDURE — 74011250636 HC RX REV CODE- 250/636: Performed by: INTERNAL MEDICINE

## 2019-02-26 PROCEDURE — 77030012965 HC NDL HUBR BBMI -A

## 2019-02-26 PROCEDURE — 74011250636 HC RX REV CODE- 250/636

## 2019-02-26 PROCEDURE — 96413 CHEMO IV INFUSION 1 HR: CPT

## 2019-02-26 PROCEDURE — 80053 COMPREHEN METABOLIC PANEL: CPT

## 2019-02-26 PROCEDURE — 85025 COMPLETE CBC W/AUTO DIFF WBC: CPT

## 2019-02-26 RX ORDER — SODIUM CHLORIDE 9 MG/ML
10 INJECTION INTRAMUSCULAR; INTRAVENOUS; SUBCUTANEOUS AS NEEDED
Status: ACTIVE | OUTPATIENT
Start: 2019-02-26 | End: 2019-02-26

## 2019-02-26 RX ORDER — SODIUM CHLORIDE 0.9 % (FLUSH) 0.9 %
10-40 SYRINGE (ML) INJECTION AS NEEDED
Status: DISPENSED | OUTPATIENT
Start: 2019-02-26 | End: 2019-02-26

## 2019-02-26 RX ORDER — SODIUM CHLORIDE 9 MG/ML
25 INJECTION, SOLUTION INTRAVENOUS CONTINUOUS
Status: DISPENSED | OUTPATIENT
Start: 2019-02-26 | End: 2019-02-26

## 2019-02-26 RX ORDER — HEPARIN 100 UNIT/ML
300-500 SYRINGE INTRAVENOUS AS NEEDED
Status: DISPENSED | OUTPATIENT
Start: 2019-02-26 | End: 2019-02-26

## 2019-02-26 RX ADMIN — TRASTUZUMAB 440 MG: 150 INJECTION, POWDER, LYOPHILIZED, FOR SOLUTION INTRAVENOUS at 10:22

## 2019-02-26 RX ADMIN — SODIUM CHLORIDE 25 ML/HR: 900 INJECTION, SOLUTION INTRAVENOUS at 10:22

## 2019-02-26 RX ADMIN — SODIUM CHLORIDE, PRESERVATIVE FREE 500 UNITS: 5 INJECTION INTRAVENOUS at 10:55

## 2019-02-26 RX ADMIN — Medication 30 ML: at 10:22

## 2019-02-26 RX ADMIN — Medication 10 ML: at 10:55

## 2019-02-26 NOTE — PROGRESS NOTES
conducted an initial consultation and Spiritual Assessment for Sharlette Mohs, who is a 62 y.o.,female. Patients Primary Language is: [place]. According to the patients EMR Tenriism Affiliation is: [place]. The reason the Patient came to the hospital is:   Patient Active Problem List    Diagnosis Date Noted    Hypokalemia 04/19/2018    Antineoplastic chemotherapy induced anemia 04/18/2018    Fatigue due to treatment 04/18/2018    Breast cancer, right ([facility] [address] 03/08/2018    Malignant neoplasm of upper-outer quadrant of right breast in female, estrogen receptor negative ([address] 02/14/2018    Viral hepatitis B with hepatic coma 02/14/2018    GERD (gastroesophageal reflux disease) 09/05/2014        The  provided the following Interventions:  Initiated a relationship of care and support. Explored issues of vandana, belief, spirituality and Mandaen/ritual needs while hospitalized. Listened empathically. Patient denies practice of any Catholic. Patient shared that she's been diagnosed with breast cancer and is on her 10th round of chemo. Provided chaplaincy education. Provided information about Spiritual Care Services. Offered  assurance of continued prayers on patient's behalf. Chart reviewed. The following outcomes where achieved:  Patient shared limited information about both her medical narrative and spiritual journey/beliefs.  confirmed Patient's Tenriism Affiliation. Patient processed feeling about current hospitalization. Patient expressed gratitude for 's visit. Assessment:  Patient denies any emotional concerns or spiritual needs. Patient does not have any Mandaen/cultural needs that will affect patients preferences in health care. There are no spiritual or Mandaen issues which require intervention at this time. Plan:  Chaplains will continue to follow and will provide pastoral care on an as needed/requested basis.    recommends bedside caregivers page  on duty if patient shows signs of acute spiritual or emotional distress.     Chaplain Resident [address]   [phone]

## 2019-02-26 NOTE — PROGRESS NOTES
[facility] Progress Note    Date: 2019    Name: Cheryl Dee    MRN: [id number]         : 1960     Herceptin C11      Ms. Megan Santiago arrived in the [place] today at [phone], in stable condition, here for Cycle 11, IV Herceptin (Trastuzumab) Infusion & Q 3 Week, CBC/Procrit injection. She was assessed and education was provided. Ms. Ventura's vitals were reviewed. Visit Vitals  /82 (BP 1 Location: Left arm, BP Patient Position: Sitting)   Pulse 77   Temp 98.7 °F (37.1 °C)   Resp 18   Ht 5' 5\" (1.651 m)   Wt 52.2 kg (115 lb 1.6 oz)   Breastfeeding? No   BMI 19.15 kg/m²       Right chest single lumen mediport accessed with 20 G 1 inch johnson using sterile technique. Postitive for blood return and flushes without difficulty. Labs drawn off for CBC and CMP per ordered. Chemo is not dependent on labs. Recent Results (from the past 12 hour(s))   CBC WITH 3 PART DIFF    Collection Time: 19  9:24 AM   Result Value Ref Range    WBC 9.4 4.5 - 13.0 K/uL    RBC 3.82 (L) 4.10 - 5.10 M/uL    HGB 11.6 (L) 12.0 - 16 g/dL    HCT 35.5 (L) 36 - 48 %    MCV 92.9 78 - 102 FL    MCH 30.4 25.0 - 35.0 PG    MCHC 32.7 31 - 37 g/dL    RDW 14.7 (H) 11.5 - 14.5 %    PLATELET 131 711 - 667 K/uL    NEUTROPHILS 86 (H) 40 - 70 %    MIXED CELLS 3 0.1 - 17 %    LYMPHOCYTES 11 (L) 14 - 44 %    ABS. NEUTROPHILS 8.1 1.8 - 9.5 K/UL    ABS. MIXED CELLS 0.3 0.0 - 2.3 K/uL    ABS. LYMPHOCYTES 1.0 (L) 1.1 - 5.9 K/UL    DF AUTOMATED         Results within parameters to HOLD procrit today. Herceptin (Trastuzumab) 330 mg IV (6 mg/kg) was administered via the peripheral IV, over approximately 30 minutes, per order, and without incident. After completion of the IV Herceptin, the PIV was flushed well with NS, and was removed and gauze/bandaid was applied. Ms. Megan Santiago tolerated well, and had no complaints. Ms. Megan Santiago was discharged from [place] [address] in stable condition at 1055.  She is to return in 3 weeks, on Monday, 3-19-19 at 0900, for her next appointment, for Cycle 12, IV Herceptin Infusion, and CBC/Procrit injection.      Jacqueline Jose RN  February 26, 2019  [phone]

## 2019-02-28 ENCOUNTER — HOSPITAL ENCOUNTER (OUTPATIENT)
Dept: RADIATION THERAPY | Age: 59
Discharge: HOME | End: 2019-02-28
Payer: MEDICAID

## 2019-02-28 PROCEDURE — 99211 OFF/OP EST MAY X REQ PHY/QHP: CPT

## 2019-03-01 ENCOUNTER — HOSPITAL ENCOUNTER (OUTPATIENT)
Dept: RADIATION THERAPY | Age: 59
Discharge: HOME | End: 2019-03-01
Payer: MEDICAID

## 2019-03-01 DIAGNOSIS — Z17.0 MALIGNANT NEOPLASM OF RIGHT BREAST IN FEMALE, ESTROGEN RECEPTOR POSITIVE, UNSPECIFIED SITE OF BREAST (HCC): ICD-10-CM

## 2019-03-01 DIAGNOSIS — C50.911 MALIGNANT NEOPLASM OF RIGHT BREAST IN FEMALE, ESTROGEN RECEPTOR POSITIVE, UNSPECIFIED SITE OF BREAST (HCC): ICD-10-CM

## 2019-03-01 PROCEDURE — 77290 THER RAD SIMULAJ FIELD CPLX: CPT

## 2019-03-01 PROCEDURE — 77332 RADIATION TREATMENT AID(S): CPT

## 2019-03-04 ENCOUNTER — HOSPITAL ENCOUNTER (OUTPATIENT)
Dept: MAMMOGRAPHY | Age: 59
Discharge: HOME | End: 2019-03-04
Attending: RADIOLOGY
Payer: MEDICAID

## 2019-03-04 PROCEDURE — 77066 DX MAMMO INCL CAD BI: CPT

## 2019-03-08 ENCOUNTER — HOSPITAL ENCOUNTER (OUTPATIENT)
Dept: RADIATION THERAPY | Age: 59
Discharge: HOME | End: 2019-03-08
Payer: MEDICAID

## 2019-03-08 PROCEDURE — 77295 3-D RADIOTHERAPY PLAN: CPT

## 2019-03-08 PROCEDURE — 77334 RADIATION TREATMENT AID(S): CPT

## 2019-03-08 PROCEDURE — 77300 RADIATION THERAPY DOSE PLAN: CPT

## 2019-03-11 ENCOUNTER — APPOINTMENT (OUTPATIENT)
Dept: INFUSION THERAPY | Age: 59
End: 2019-03-11
Payer: MEDICAID

## 2019-03-11 ENCOUNTER — HOSPITAL ENCOUNTER (OUTPATIENT)
Dept: RADIATION THERAPY | Age: 59
Discharge: HOME | End: 2019-03-11
Payer: MEDICAID

## 2019-03-11 PROCEDURE — 77280 THER RAD SIMULAJ FIELD SMPL: CPT

## 2019-03-12 ENCOUNTER — HOSPITAL ENCOUNTER (OUTPATIENT)
Dept: RADIATION THERAPY | Age: 59
Discharge: HOME | End: 2019-03-12
Payer: MEDICAID

## 2019-03-12 PROCEDURE — 77412 RADIATION TX DELIVERY LVL 3: CPT

## 2019-03-13 ENCOUNTER — HOSPITAL ENCOUNTER (OUTPATIENT)
Dept: RADIATION THERAPY | Age: 59
Discharge: HOME | End: 2019-03-13
Payer: MEDICAID

## 2019-03-13 PROCEDURE — 77412 RADIATION TX DELIVERY LVL 3: CPT

## 2019-03-15 ENCOUNTER — HOSPITAL ENCOUNTER (OUTPATIENT)
Dept: RADIATION THERAPY | Age: 59
Discharge: HOME | End: 2019-03-15
Payer: MEDICAID

## 2019-03-15 PROCEDURE — 77412 RADIATION TX DELIVERY LVL 3: CPT

## 2019-03-18 ENCOUNTER — HOSPITAL ENCOUNTER (OUTPATIENT)
Dept: RADIATION THERAPY | Age: 59
Discharge: HOME | End: 2019-03-18
Payer: MEDICAID

## 2019-03-18 PROCEDURE — 77336 RADIATION PHYSICS CONSULT: CPT

## 2019-03-18 PROCEDURE — 77412 RADIATION TX DELIVERY LVL 3: CPT

## 2019-03-19 ENCOUNTER — HOSPITAL ENCOUNTER (OUTPATIENT)
Dept: INFUSION THERAPY | Age: 59
Discharge: HOME | End: 2019-03-19

## 2019-03-19 ENCOUNTER — HOSPITAL ENCOUNTER (OUTPATIENT)
Dept: RADIATION THERAPY | Age: 59
Discharge: HOME | End: 2019-03-19
Payer: MEDICAID

## 2019-03-19 VITALS
HEART RATE: 79 BPM | HEIGHT: 65 IN | BODY MASS INDEX: 19.99 KG/M2 | RESPIRATION RATE: 18 BRPM | OXYGEN SATURATION: 100 % | SYSTOLIC BLOOD PRESSURE: 114 MMHG | WEIGHT: 120 LBS | DIASTOLIC BLOOD PRESSURE: 69 MMHG | TEMPERATURE: 97.8 F

## 2019-03-19 DIAGNOSIS — C50.411 MALIGNANT NEOPLASM OF UPPER-OUTER QUADRANT OF RIGHT BREAST IN FEMALE, ESTROGEN RECEPTOR NEGATIVE (HCC): Primary | ICD-10-CM

## 2019-03-19 DIAGNOSIS — Z17.1 MALIGNANT NEOPLASM OF UPPER-OUTER QUADRANT OF RIGHT BREAST IN FEMALE, ESTROGEN RECEPTOR NEGATIVE (HCC): Primary | ICD-10-CM

## 2019-03-19 LAB
ALBUMIN SERPL-MCNC: 3.2 G/DL (ref 3.4–5)
ALBUMIN/GLOB SERPL: 0.7 {RATIO} (ref 0.8–1.7)
ALP SERPL-CCNC: 81 U/L (ref 45–117)
ALT SERPL-CCNC: 17 U/L (ref 13–56)
ANION GAP SERPL CALC-SCNC: 5 MMOL/L (ref 3–18)
AST SERPL-CCNC: 15 U/L (ref 15–37)
BASO+EOS+MONOS # BLD AUTO: 0.3 K/UL (ref 0–2.3)
BASO+EOS+MONOS NFR BLD AUTO: 6 % (ref 0.1–17)
BILIRUB SERPL-MCNC: 0.4 MG/DL (ref 0.2–1)
BUN SERPL-MCNC: 16 MG/DL (ref 7–18)
BUN/CREAT SERPL: 23 (ref 12–20)
CALCIUM SERPL-MCNC: 8.4 MG/DL (ref 8.5–10.1)
CHLORIDE SERPL-SCNC: 109 MMOL/L (ref 100–108)
CO2 SERPL-SCNC: 28 MMOL/L (ref 21–32)
CREAT SERPL-MCNC: 0.71 MG/DL (ref 0.6–1.3)
DIFFERENTIAL METHOD BLD: ABNORMAL
ERYTHROCYTE [DISTWIDTH] IN BLOOD BY AUTOMATED COUNT: 15.1 % (ref 11.5–14.5)
GLOBULIN SER CALC-MCNC: 4.3 G/DL (ref 2–4)
GLUCOSE SERPL-MCNC: 82 MG/DL (ref 74–99)
HCT VFR BLD AUTO: 32.9 % (ref 36–48)
HGB BLD-MCNC: 10.8 G/DL (ref 12–16)
LYMPHOCYTES # BLD: 1 K/UL (ref 1.1–5.9)
LYMPHOCYTES NFR BLD: 19 % (ref 14–44)
MCH RBC QN AUTO: 30.9 PG (ref 25–35)
MCHC RBC AUTO-ENTMCNC: 32.8 G/DL (ref 31–37)
MCV RBC AUTO: 94 FL (ref 78–102)
NEUTS SEG # BLD: 3.9 K/UL (ref 1.8–9.5)
NEUTS SEG NFR BLD: 75 % (ref 40–70)
PLATELET # BLD AUTO: 202 K/UL (ref 140–440)
POTASSIUM SERPL-SCNC: 4 MMOL/L (ref 3.5–5.5)
PROT SERPL-MCNC: 7.5 G/DL (ref 6.4–8.2)
RBC # BLD AUTO: 3.5 M/UL (ref 4.1–5.1)
SODIUM SERPL-SCNC: 142 MMOL/L (ref 136–145)
WBC # BLD AUTO: 5.2 K/UL (ref 4.5–13)

## 2019-03-19 PROCEDURE — 80053 COMPREHEN METABOLIC PANEL: CPT

## 2019-03-19 PROCEDURE — 74011250636 HC RX REV CODE- 250/636

## 2019-03-19 PROCEDURE — 77417 THER RADIOLOGY PORT IMAGE(S): CPT

## 2019-03-19 PROCEDURE — 77030012965 HC NDL HUBR BBMI -A

## 2019-03-19 PROCEDURE — 85025 COMPLETE CBC W/AUTO DIFF WBC: CPT

## 2019-03-19 PROCEDURE — 74011250636 HC RX REV CODE- 250/636: Performed by: INTERNAL MEDICINE

## 2019-03-19 PROCEDURE — 96413 CHEMO IV INFUSION 1 HR: CPT

## 2019-03-19 PROCEDURE — 77412 RADIATION TX DELIVERY LVL 3: CPT

## 2019-03-19 RX ORDER — SODIUM CHLORIDE 0.9 % (FLUSH) 0.9 %
10-40 SYRINGE (ML) INJECTION AS NEEDED
Status: DISPENSED | OUTPATIENT
Start: 2019-03-19 | End: 2019-03-19

## 2019-03-19 RX ORDER — SODIUM CHLORIDE 9 MG/ML
10 INJECTION INTRAMUSCULAR; INTRAVENOUS; SUBCUTANEOUS AS NEEDED
Status: ACTIVE | OUTPATIENT
Start: 2019-03-19 | End: 2019-03-19

## 2019-03-19 RX ORDER — HEPARIN 100 UNIT/ML
300-500 SYRINGE INTRAVENOUS AS NEEDED
Status: DISPENSED | OUTPATIENT
Start: 2019-03-19 | End: 2019-03-19

## 2019-03-19 RX ADMIN — SODIUM CHLORIDE 20 ML: 9 INJECTION INTRAMUSCULAR; INTRAVENOUS; SUBCUTANEOUS at 10:27

## 2019-03-19 RX ADMIN — TRASTUZUMAB 330 MG: 150 INJECTION, POWDER, LYOPHILIZED, FOR SOLUTION INTRAVENOUS at 09:53

## 2019-03-19 RX ADMIN — Medication 20 ML: at 09:06

## 2019-03-19 RX ADMIN — HEPARIN 500 UNITS: 100 SYRINGE at 10:27

## 2019-03-19 NOTE — PROGRESS NOTES
[facility] Progress Note    Date: 2019    Name: Rolando Alonzo    MRN: [id number]         : 1960     Herceptin C12      Ms. Lovely Olivo arrived in the [facility] today at 7099, in stable condition, here for Cycle 12, IV Herceptin (Trastuzumab) Infusion & Q 3 Week, CBC/Procrit injection. She was assessed and education was provided. Patient made aware to answer her phone because [facility] is ordering her next [employer] and they will be calling to set up a date and time. Ms. Ventura's vitals were reviewed. Visit Vitals  /69 (BP 1 Location: Right arm, BP Patient Position: At rest)   Pulse 79   Temp 97.8 °F (36.6 °C)   Resp 18   Ht 5' 5\" (1.651 m)   Wt 54.4 kg (120 lb)   SpO2 100%   Breastfeeding? No   BMI 19.97 kg/m²       Right chest single lumen mediport accessed with 20 G 1 inch johnson using sterile technique. Postitive for blood return and flushes without difficulty. Labs drawn off for CBC and CMP per ordered after 10ml waste. Chemo is not dependent on labs. Recent Results (from the past 12 hour(s))   CBC WITH 3 PART DIFF    Collection Time: 19  9:06 AM   Result Value Ref Range    WBC 5.2 4.5 - 13.0 K/uL    RBC 3.50 (L) 4.10 - 5.10 M/uL    HGB 10.8 (L) 12.0 - 16 g/dL    HCT 32.9 (L) 36 - 48 %    MCV 94.0 78 - 102 FL    MCH 30.9 25.0 - 35.0 PG    MCHC 32.8 31 - 37 g/dL    RDW 15.1 (H) 11.5 - 14.5 %    PLATELET 891 384 - 805 K/uL    NEUTROPHILS 75 (H) 40 - 70 %    MIXED CELLS 6 0.1 - 17 %    LYMPHOCYTES 19 14 - 44 %    ABS. NEUTROPHILS 3.9 1.8 - 9.5 K/UL    ABS. MIXED CELLS 0.3 0.0 - 2.3 K/uL    ABS.  LYMPHOCYTES 1.0 (L) 1.1 - 5.9 K/UL    DF AUTOMATED     METABOLIC PANEL, COMPREHENSIVE    Collection Time: 19  9:06 AM   Result Value Ref Range    Sodium 142 136 - 145 mmol/L    Potassium 4.0 3.5 - 5.5 mmol/L    Chloride 109 (H) 100 - 108 mmol/L    CO2 28 21 - 32 mmol/L    Anion gap 5 3.0 - 18 mmol/L    Glucose 82 74 - 99 mg/dL    BUN 16 7.0 - 18 MG/DL    Creatinine 0.71 0.6 - 1.3 MG/DL    BUN/Creatinine ratio 23 (H) 12 - 20      GFR est AA >60 >60 ml/min/1.73m2    GFR est non-AA >60 >60 ml/min/1.73m2    Calcium 8.4 (L) 8.5 - 10.1 MG/DL    Bilirubin, total 0.4 0.2 - 1.0 MG/DL    ALT (SGPT) 17 13 - 56 U/L    AST (SGOT) 15 15 - 37 U/L    Alk. phosphatase 81 45 - 117 U/L    Protein, total 7.5 6.4 - 8.2 g/dL    Albumin 3.2 (L) 3.4 - 5.0 g/dL    Globulin 4.3 (H) 2.0 - 4.0 g/dL    A-G Ratio 0.7 (L) 0.8 - 1.7         Results within parameters to HOLD procrit today. Herceptin (Trastuzumab) 330 mg IV (6 mg/kg) was administered via the right upper chest mediport, over approximately 30 minutes, per order, and without incident. After completion of the IV Herceptin,right upper chest mediport was flushed with 20ml NS and 500units heparin, and johnson was removed and gauze/bandaid were applied. Ms. Efrem White tolerated well, and had no complaints. Ms. Efrem White was discharged from [place] [address] in stable condition at 1030. She is to return in 3 weeks, on Monday, 4/9/19 at 0900, for her next appointment, for Cycle 13, IV Herceptin Infusion, and CBC/Procrit injection.      Colette Clemente RN  March 19, 2019  [phone]

## 2019-03-20 ENCOUNTER — HOSPITAL ENCOUNTER (OUTPATIENT)
Dept: RADIATION THERAPY | Age: 59
Discharge: HOME | End: 2019-03-20
Payer: MEDICAID

## 2019-03-20 PROCEDURE — 77412 RADIATION TX DELIVERY LVL 3: CPT

## 2019-03-21 ENCOUNTER — HOSPITAL ENCOUNTER (OUTPATIENT)
Dept: RADIATION THERAPY | Age: 59
Discharge: HOME | End: 2019-03-21
Payer: MEDICAID

## 2019-03-22 ENCOUNTER — HOSPITAL ENCOUNTER (OUTPATIENT)
Dept: RADIATION THERAPY | Age: 59
Discharge: HOME | End: 2019-03-22
Payer: MEDICAID

## 2019-03-22 PROCEDURE — 77412 RADIATION TX DELIVERY LVL 3: CPT

## 2019-03-25 ENCOUNTER — HOSPITAL ENCOUNTER (OUTPATIENT)
Dept: ONCOLOGY | Age: 59
Discharge: HOME | End: 2019-03-25

## 2019-03-25 ENCOUNTER — OFFICE VISIT (OUTPATIENT)
Dept: ONCOLOGY | Age: 59
End: 2019-03-25

## 2019-03-25 ENCOUNTER — HOSPITAL ENCOUNTER (OUTPATIENT)
Dept: LAB | Age: 59
Discharge: HOME | End: 2019-03-25
Payer: MEDICAID

## 2019-03-25 ENCOUNTER — HOSPITAL ENCOUNTER (OUTPATIENT)
Dept: RADIATION THERAPY | Age: 59
Discharge: HOME | End: 2019-03-25
Payer: MEDICAID

## 2019-03-25 VITALS
OXYGEN SATURATION: 100 % | DIASTOLIC BLOOD PRESSURE: 68 MMHG | HEIGHT: 65 IN | WEIGHT: 116 LBS | BODY MASS INDEX: 19.33 KG/M2 | HEART RATE: 93 BPM | RESPIRATION RATE: 16 BRPM | SYSTOLIC BLOOD PRESSURE: 115 MMHG | TEMPERATURE: 98.5 F

## 2019-03-25 DIAGNOSIS — C50.411 MALIGNANT NEOPLASM OF UPPER-OUTER QUADRANT OF RIGHT BREAST IN FEMALE, ESTROGEN RECEPTOR NEGATIVE (HCC): ICD-10-CM

## 2019-03-25 DIAGNOSIS — K21.9 GASTROESOPHAGEAL REFLUX DISEASE WITHOUT ESOPHAGITIS: ICD-10-CM

## 2019-03-25 DIAGNOSIS — Z17.1 MALIGNANT NEOPLASM OF UPPER-OUTER QUADRANT OF RIGHT BREAST IN FEMALE, ESTROGEN RECEPTOR NEGATIVE (HCC): ICD-10-CM

## 2019-03-25 DIAGNOSIS — C50.411 MALIGNANT NEOPLASM OF UPPER-OUTER QUADRANT OF RIGHT BREAST IN FEMALE, ESTROGEN RECEPTOR NEGATIVE (HCC): Primary | ICD-10-CM

## 2019-03-25 DIAGNOSIS — Z17.1 MALIGNANT NEOPLASM OF UPPER-OUTER QUADRANT OF RIGHT BREAST IN FEMALE, ESTROGEN RECEPTOR NEGATIVE (HCC): Primary | ICD-10-CM

## 2019-03-25 LAB
ALBUMIN SERPL-MCNC: 3.4 G/DL (ref 3.4–5)
ALBUMIN/GLOB SERPL: 0.9 {RATIO} (ref 0.8–1.7)
ALP SERPL-CCNC: 90 U/L (ref 45–117)
ALT SERPL-CCNC: 18 U/L (ref 13–56)
ANION GAP SERPL CALC-SCNC: 8 MMOL/L (ref 3–18)
AST SERPL-CCNC: 16 U/L (ref 15–37)
BASO+EOS+MONOS # BLD AUTO: 0.2 K/UL (ref 0–2.3)
BASO+EOS+MONOS NFR BLD AUTO: 5 % (ref 0.1–17)
BILIRUB SERPL-MCNC: 0.3 MG/DL (ref 0.2–1)
BUN SERPL-MCNC: 17 MG/DL (ref 7–18)
BUN/CREAT SERPL: 23 (ref 12–20)
CALCIUM SERPL-MCNC: 8.6 MG/DL (ref 8.5–10.1)
CHLORIDE SERPL-SCNC: 107 MMOL/L (ref 100–108)
CO2 SERPL-SCNC: 26 MMOL/L (ref 21–32)
CREAT SERPL-MCNC: 0.75 MG/DL (ref 0.6–1.3)
DIFFERENTIAL METHOD BLD: ABNORMAL
ERYTHROCYTE [DISTWIDTH] IN BLOOD BY AUTOMATED COUNT: 15 % (ref 11.5–14.5)
FERRITIN SERPL-MCNC: 108 NG/ML (ref 8–388)
GLOBULIN SER CALC-MCNC: 4 G/DL (ref 2–4)
GLUCOSE SERPL-MCNC: 82 MG/DL (ref 74–99)
HCT VFR BLD AUTO: 35.8 % (ref 36–48)
HGB BLD-MCNC: 11.7 G/DL (ref 12–16)
IRON SATN MFR SERPL: 27 %
IRON SERPL-MCNC: 70 UG/DL (ref 50–175)
LYMPHOCYTES # BLD: 1.2 K/UL (ref 1.1–5.9)
LYMPHOCYTES NFR BLD: 29 % (ref 14–44)
MCH RBC QN AUTO: 30.7 PG (ref 25–35)
MCHC RBC AUTO-ENTMCNC: 32.7 G/DL (ref 31–37)
MCV RBC AUTO: 94 FL (ref 78–102)
NEUTS SEG # BLD: 2.7 K/UL (ref 1.8–9.5)
NEUTS SEG NFR BLD: 66 % (ref 40–70)
PLATELET # BLD AUTO: 174 K/UL (ref 140–440)
POTASSIUM SERPL-SCNC: 3.6 MMOL/L (ref 3.5–5.5)
PROT SERPL-MCNC: 7.4 G/DL (ref 6.4–8.2)
RBC # BLD AUTO: 3.81 M/UL (ref 4.1–5.1)
SODIUM SERPL-SCNC: 141 MMOL/L (ref 136–145)
TIBC SERPL-MCNC: 256 UG/DL (ref 250–450)
WBC # BLD AUTO: 4.1 K/UL (ref 4.5–13)

## 2019-03-25 PROCEDURE — 36415 COLL VENOUS BLD VENIPUNCTURE: CPT

## 2019-03-25 PROCEDURE — 77412 RADIATION TX DELIVERY LVL 3: CPT

## 2019-03-25 PROCEDURE — 82728 ASSAY OF FERRITIN: CPT

## 2019-03-25 PROCEDURE — 83540 ASSAY OF IRON: CPT

## 2019-03-25 PROCEDURE — 86300 IMMUNOASSAY TUMOR CA 15-3: CPT

## 2019-03-25 PROCEDURE — 80053 COMPREHEN METABOLIC PANEL: CPT

## 2019-03-25 NOTE — PATIENT INSTRUCTIONS

## 2019-03-25 NOTE — PROGRESS NOTES
Hematology/Oncology  Progress Note    Name: Hansel Ramirez  Date:3/25/2019  : 1960    PCP: Padmini Sloan MD     Ms. Jazmine Lazcano is a 62 y.o. -American woman with HER-2 positive invasive ductal adenocarcinoma the right breast    Current therapy: Carboplatin, Taxotere, and Herceptin plus Perjeda. The patient completed 6 cycles of adjuvant therapy and is now completed her final surgical procedure. She has completed cycle 12 of Herceptin on 3/19/2019    Subjective:     Ms. Jazmine Lazcano is a 77-year-old -American woman who is currently receiving neoadjuvant systemic therapy for her HER-2 positive invasive ductal adenocarcinoma the right breast.  On 9/10/2018 she completed cycle 6 of her treatment. The patient reports that she tolerated the medication well. She did not experience any significant nausea or emesis. She has experienced a mild degree of fatigue. Today, she has no new or additional complaints to report. The patient has subsequently undergone surgery and informed me that there was no residual tumor at the surgical site. The patient was was restarted on her Herceptin medication every 21 days and is tolerating and she has completed cycle 12 on 3/19/2019. Cycle 13 is scheduled for 2019. She was is currently getting some radiation treatment     Past medical history, family history, and social history: these were reviewed and remains unchanged.     Past Medical History:   Diagnosis Date    Cancer [facility])     right breast    Hepatitis B     S/P chemotherapy, time since 4-12 weeks     Tuberculosis 1980    Wrist fracture, right 10/13/2018     Past Surgical History:   Procedure Laterality Date    BX/REMV,LYMPH NODE,DEEP AXILL Right 10/19/2018    Dr. Luci Alvarez HX GYN      hysterectomy     HX MASTECTOMY Right 10/19/2018    RIGHT BREAST NEEDLE LOCALIZED LUMPECTOMY AND SENTINEL LYMPH NODE (LOC 0830; NUC 10.18.18 @ 0900) performed by Josué Velasco MD at [place] [address] neck surgery     IR INSERT TUNL CVC W PORT OVER 5 YEARS  1/16/2019    ME INSJ PRPH CTR VAD W/SUBQ PORT AGE 5 YR/> N/A 03/08/2018    Dr. Melany Clement     Social History     Socioeconomic History    Marital status: SINGLE     Spouse name: Not on file    Number of children: Not on file    Years of education: Not on file    Highest education level: Not on file   Occupational History    Not on file   Social Needs    Financial resource strain: Not on file    Food insecurity:     Worry: Not on file     Inability: Not on file    Transportation needs:     Medical: Not on file     Non-medical: Not on file   Tobacco Use    Smoking status: Never Smoker    Smokeless tobacco: Current User   Substance and Sexual Activity    Alcohol use: No     Alcohol/week: 1.2 oz     Types: 2 Cans of beer per week     Comment: weekly     Drug use: No    Sexual activity: Never   Lifestyle    Physical activity:     Days per week: Not on file     Minutes per session: Not on file    Stress: Not on file   Relationships    Social connections:     Talks on phone: Not on file     Gets together: Not on file     Attends Restorationism service: Not on file     Active member of club or organization: Not on file     Attends meetings of clubs or organizations: Not on file     Relationship status: Not on file    Intimate partner violence:     Fear of current or ex partner: Not on file     Emotionally abused: Not on file     Physically abused: Not on file     Forced sexual activity: Not on file   Other Topics Concern    Not on file   Social History Narrative    Not on file     Family History   Problem Relation Age of Onset    Stroke Mother     Breast Cancer Mother     Heart Disease Father     Cancer Maternal Grandmother          Review of Systems  Constitutional: The patient has complaints of a mild degree of fatigue and some weakness following chemotherapy.   She has a noticeable increase in her food intake due to increasing appetite following chemotherapy. HEENT: The patient denies recent head trauma, eye pain, blurred vision,  hearing deficit, oropharyngeal mucosal pain or lesions, and the patient denies throat pain or discomfort. Lymphatics: The patient denies palpable peripheral lymphadenopathy. Hematologic: The patient denies having bruising, bleeding, or progressive fatigue. Respiratory: Patient denies having shortness of breath, cough, sputum production, fever, or dyspnea on exertion. Cardiovascular: The patient denies having leg pain, leg swelling, heart palpitations, chest permit, chest pain, or lightheadedness. The patient denies having dyspnea on exertion. Gastrointestinal: The patient denies having nausea, emesis, or diarrhea. The patient denies having any hematemesis or blood in the stool. Genitourinary: Patient denies having urinary urgency, frequency, or dysuria. The patient denies having blood in the urine. Psychological: The patient denies having symptoms of nervousness, anxiety, depression, or thoughts of harming self. Skin: Patient denies having skin rashes, skin, ulcerations, or unexplained itching or pruritus. Musculoskeletal: The patient denies having pain in the joints or bones. Objective:     Visit Vitals  /68   Pulse 93   Temp 98.5 °F (36.9 °C) (Oral)   Resp 16   Ht 5' 5\" (1.651 m)   Wt 52.6 kg (116 lb)   SpO2 100%   BMI 19.30 kg/m²     ECOG PS=0    Physical Exam:   Gen. Appearance: The patient is in no acute distress. Skin: There is no bruise or rash. HEENT: The exam is unremarkable. Neck: Supple without lymphadenopathy or thyromegaly. Lungs: Clear to auscultation and percussion; there are no wheezes or rhonchi. Heart: Regular rate and rhythm; there are no murmurs, gallops, or rubs. Anterior chest wall and breast: There is no change in the exam, except for the fact that she has a new MediPort on the right upper chest wall. The skin is intact with no evidence of inflammation or infectionKary Valdivia Abdomen: Bowel sounds are present and normal.  There is no guarding, tenderness, or hepatosplenomegaly. Extremities: There is no clubbing, cyanosis, or edema. Neurologic: There are no focal neurologic deficits. Lymphatics: There is no palpable peripheral lymphadenopathy. Musculoskeletal: The patient has full range of motion at all joints. There is no evidence of joint deformity or effusions. There is no focal joint tenderness. Psychological/psychiatric: There is no clinical evidence of anxiety, depression, or melancholy. Lab data:      Results for orders placed or performed during the hospital encounter of 03/25/19   CBC WITH 3 PART DIFF     Status: Abnormal   Result Value Ref Range Status    WBC 4.1 (L) 4.5 - 13.0 K/uL Final    RBC 3.81 (L) 4.10 - 5.10 M/uL Final    HGB 11.7 (L) 12.0 - 16 g/dL Final    HCT 35.8 (L) 36 - 48 % Final    MCV 94.0 78 - 102 FL Final    MCH 30.7 25.0 - 35.0 PG Final    MCHC 32.7 31 - 37 g/dL Final    RDW 15.0 (H) 11.5 - 14.5 % Final    PLATELET 771 847 - 747 K/uL Final    NEUTROPHILS 66 40 - 70 % Final    MIXED CELLS 5 0.1 - 17 % Final    LYMPHOCYTES 29 14 - 44 % Final    ABS. NEUTROPHILS 2.7 1.8 - 9.5 K/UL Final    ABS. MIXED CELLS 0.2 0.0 - 2.3 K/uL Final    ABS. LYMPHOCYTES 1.2 1.1 - 5.9 K/UL Final     Comment: Test performed at [address] [place] [address] or Outpatient Infusion Center Location. Reviewed by Medical Director. DF AUTOMATED   Final           Assessment:     1. Malignant neoplasm of upper-outer quadrant of right breast in female, estrogen receptor negative ([facility] [address]    2. Gastroesophageal reflux disease without esophagitis      Plan:   Invasive ductal adenocarcinoma right upper quadrant of the breast: The patient completed cycle 6 of her neoadjuvant treatment program.  She also has subsequently completed surgery. There was no residual tumor at the time of her surgery and the sentinel lymph node was not.   Herceptin will continue every 3 weeks until she completes a full year of therapy. She has compeletd cycle 12 on 3/19/2019. Cycle 13 of her treatment is schedule for 4/9/2019. She is currently having radiation at this time. Pt has been schedule  For a MUGA scan and she is made aware of plan    Chemotherapy-induced anemia: I will check her iron profile and ferritin levels at this time. I have instructed the patient to begin taking Slow Fe 1 tablet daily. She was previously started on Procrit 60,000 unit with her chemotherapy treatment. Her most recent CBC from today shows her hemoglobin is 11.7 g/dl and  Hematocrit 35.8%  she has not required any further Procrit dosing. GERD: The patient was encouraged to continue taking Prilosec OTC. Follow-up in 6 weeks  Orders Placed This Encounter    COMPLETE CBC & AUTO DIFF WBC    InHouse CBC (Infomous)     Standing Status:   Future     Number of Occurrences:   1     Standing Expiration Date:   1/3/0279    METABOLIC PANEL, COMPREHENSIVE     Standing Status:   Future     Standing Expiration Date:   3/25/2020    CA 27.29     Standing Status:   Future     Standing Expiration Date:   3/25/2020    IRON PROFILE     Standing Status:   Future     Standing Expiration Date:   3/25/2020    FERRITIN     Standing Status:   Future     Standing Expiration Date:   3/25/2020       Sulema Aaron NP  3/25/2019    I have assessed the patient independently and  agree with the full assessment as outlined. Mathew Schuler MD, FACP          Please note: This document has been produced using voice recognition software. Unrecognized errors in transcription may be present.

## 2019-03-26 ENCOUNTER — HOSPITAL ENCOUNTER (OUTPATIENT)
Dept: RADIATION THERAPY | Age: 59
Discharge: HOME | End: 2019-03-26
Payer: MEDICAID

## 2019-03-26 LAB — CANCER AG27-29 SERPL-ACNC: 29.7 U/ML (ref 0–38.6)

## 2019-03-26 PROCEDURE — 77412 RADIATION TX DELIVERY LVL 3: CPT

## 2019-03-26 PROCEDURE — 77336 RADIATION PHYSICS CONSULT: CPT

## 2019-03-27 ENCOUNTER — HOSPITAL ENCOUNTER (OUTPATIENT)
Dept: RADIATION THERAPY | Age: 59
Discharge: HOME | End: 2019-03-27
Payer: MEDICAID

## 2019-03-27 PROCEDURE — 77412 RADIATION TX DELIVERY LVL 3: CPT

## 2019-03-27 PROCEDURE — 77417 THER RADIOLOGY PORT IMAGE(S): CPT

## 2019-03-28 ENCOUNTER — HOSPITAL ENCOUNTER (OUTPATIENT)
Dept: RADIATION THERAPY | Age: 59
Discharge: HOME | End: 2019-03-28
Payer: MEDICAID

## 2019-03-28 PROCEDURE — 77412 RADIATION TX DELIVERY LVL 3: CPT

## 2019-03-29 ENCOUNTER — APPOINTMENT (OUTPATIENT)
Dept: RADIATION THERAPY | Age: 59
End: 2019-03-29
Payer: MEDICAID

## 2019-03-29 PROCEDURE — 77331 SPECIAL RADIATION DOSIMETRY: CPT

## 2019-04-01 ENCOUNTER — HOSPITAL ENCOUNTER (OUTPATIENT)
Dept: RADIATION THERAPY | Age: 59
Discharge: HOME | End: 2019-04-01
Payer: MEDICAID

## 2019-04-01 PROCEDURE — 77321 SPECIAL TELETX PORT PLAN: CPT

## 2019-04-01 PROCEDURE — 77412 RADIATION TX DELIVERY LVL 3: CPT

## 2019-04-01 PROCEDURE — 77334 RADIATION TREATMENT AID(S): CPT

## 2019-04-02 ENCOUNTER — HOSPITAL ENCOUNTER (OUTPATIENT)
Dept: RADIATION THERAPY | Age: 59
Discharge: HOME | End: 2019-04-02
Payer: MEDICAID

## 2019-04-02 PROCEDURE — 77412 RADIATION TX DELIVERY LVL 3: CPT

## 2019-04-03 ENCOUNTER — HOSPITAL ENCOUNTER (OUTPATIENT)
Dept: RADIATION THERAPY | Age: 59
Discharge: HOME | End: 2019-04-03
Payer: MEDICAID

## 2019-04-03 PROCEDURE — 77412 RADIATION TX DELIVERY LVL 3: CPT

## 2019-04-04 ENCOUNTER — HOSPITAL ENCOUNTER (OUTPATIENT)
Dept: RADIATION THERAPY | Age: 59
Discharge: HOME | End: 2019-04-04
Payer: MEDICAID

## 2019-04-04 PROCEDURE — 77412 RADIATION TX DELIVERY LVL 3: CPT

## 2019-04-05 ENCOUNTER — HOSPITAL ENCOUNTER (OUTPATIENT)
Dept: RADIATION THERAPY | Age: 59
Discharge: HOME | End: 2019-04-05
Payer: MEDICAID

## 2019-04-05 PROCEDURE — 77280 THER RAD SIMULAJ FIELD SMPL: CPT

## 2019-04-05 PROCEDURE — 77412 RADIATION TX DELIVERY LVL 3: CPT

## 2019-04-08 ENCOUNTER — HOSPITAL ENCOUNTER (OUTPATIENT)
Dept: RADIATION THERAPY | Age: 59
Discharge: HOME | End: 2019-04-08
Payer: MEDICAID

## 2019-04-08 PROCEDURE — 77412 RADIATION TX DELIVERY LVL 3: CPT

## 2019-04-09 ENCOUNTER — HOSPITAL ENCOUNTER (OUTPATIENT)
Dept: RADIATION THERAPY | Age: 59
Discharge: HOME | End: 2019-04-09
Payer: MEDICAID

## 2019-04-09 ENCOUNTER — HOSPITAL ENCOUNTER (OUTPATIENT)
Dept: INFUSION THERAPY | Age: 59
Discharge: HOME | End: 2019-04-09
Payer: MEDICAID

## 2019-04-09 VITALS
HEART RATE: 78 BPM | OXYGEN SATURATION: 97 % | BODY MASS INDEX: 19.49 KG/M2 | SYSTOLIC BLOOD PRESSURE: 133 MMHG | DIASTOLIC BLOOD PRESSURE: 86 MMHG | TEMPERATURE: 98.1 F | WEIGHT: 117 LBS | HEIGHT: 65 IN | RESPIRATION RATE: 18 BRPM

## 2019-04-09 DIAGNOSIS — Z17.1 MALIGNANT NEOPLASM OF UPPER-OUTER QUADRANT OF RIGHT BREAST IN FEMALE, ESTROGEN RECEPTOR NEGATIVE (HCC): Primary | ICD-10-CM

## 2019-04-09 DIAGNOSIS — C50.411 MALIGNANT NEOPLASM OF UPPER-OUTER QUADRANT OF RIGHT BREAST IN FEMALE, ESTROGEN RECEPTOR NEGATIVE (HCC): Primary | ICD-10-CM

## 2019-04-09 LAB
ALBUMIN SERPL-MCNC: 3.4 G/DL (ref 3.4–5)
ALBUMIN/GLOB SERPL: 0.9 {RATIO} (ref 0.8–1.7)
ALP SERPL-CCNC: 85 U/L (ref 45–117)
ALT SERPL-CCNC: 20 U/L (ref 13–56)
ANION GAP SERPL CALC-SCNC: 6 MMOL/L (ref 3–18)
AST SERPL-CCNC: 13 U/L (ref 15–37)
BASO+EOS+MONOS # BLD AUTO: 0.3 K/UL (ref 0–2.3)
BASO+EOS+MONOS NFR BLD AUTO: 6 % (ref 0.1–17)
BILIRUB SERPL-MCNC: 0.2 MG/DL (ref 0.2–1)
BUN SERPL-MCNC: 17 MG/DL (ref 7–18)
BUN/CREAT SERPL: 19 (ref 12–20)
CALCIUM SERPL-MCNC: 9 MG/DL (ref 8.5–10.1)
CHLORIDE SERPL-SCNC: 109 MMOL/L (ref 100–108)
CO2 SERPL-SCNC: 28 MMOL/L (ref 21–32)
CREAT SERPL-MCNC: 0.9 MG/DL (ref 0.6–1.3)
DIFFERENTIAL METHOD BLD: ABNORMAL
ERYTHROCYTE [DISTWIDTH] IN BLOOD BY AUTOMATED COUNT: 14.6 % (ref 11.5–14.5)
GLOBULIN SER CALC-MCNC: 3.8 G/DL (ref 2–4)
GLUCOSE SERPL-MCNC: 101 MG/DL (ref 74–99)
HCT VFR BLD AUTO: 34.3 % (ref 36–48)
HGB BLD-MCNC: 11.2 G/DL (ref 12–16)
LYMPHOCYTES # BLD: 1.3 K/UL (ref 1.1–5.9)
LYMPHOCYTES NFR BLD: 30 % (ref 14–44)
MCH RBC QN AUTO: 30.7 PG (ref 25–35)
MCHC RBC AUTO-ENTMCNC: 32.7 G/DL (ref 31–37)
MCV RBC AUTO: 94 FL (ref 78–102)
NEUTS SEG # BLD: 2.7 K/UL (ref 1.8–9.5)
NEUTS SEG NFR BLD: 64 % (ref 40–70)
PLATELET # BLD AUTO: 175 K/UL (ref 140–440)
POTASSIUM SERPL-SCNC: 3.8 MMOL/L (ref 3.5–5.5)
PROT SERPL-MCNC: 7.2 G/DL (ref 6.4–8.2)
RBC # BLD AUTO: 3.65 M/UL (ref 4.1–5.1)
SODIUM SERPL-SCNC: 143 MMOL/L (ref 136–145)
WBC # BLD AUTO: 4.3 K/UL (ref 4.5–13)

## 2019-04-09 PROCEDURE — 96413 CHEMO IV INFUSION 1 HR: CPT

## 2019-04-09 PROCEDURE — 77412 RADIATION TX DELIVERY LVL 3: CPT

## 2019-04-09 PROCEDURE — 80053 COMPREHEN METABOLIC PANEL: CPT

## 2019-04-09 PROCEDURE — 74011250636 HC RX REV CODE- 250/636

## 2019-04-09 PROCEDURE — 85025 COMPLETE CBC W/AUTO DIFF WBC: CPT

## 2019-04-09 PROCEDURE — 77030012965 HC NDL HUBR BBMI -A

## 2019-04-09 PROCEDURE — 74011250636 HC RX REV CODE- 250/636: Performed by: INTERNAL MEDICINE

## 2019-04-09 RX ORDER — SODIUM CHLORIDE 9 MG/ML
10 INJECTION INTRAMUSCULAR; INTRAVENOUS; SUBCUTANEOUS AS NEEDED
Status: DISCONTINUED | OUTPATIENT
Start: 2019-04-09 | End: 2019-04-10 | Stop reason: HOSPADM

## 2019-04-09 RX ORDER — HEPARIN 100 UNIT/ML
300-500 SYRINGE INTRAVENOUS AS NEEDED
Status: DISCONTINUED | OUTPATIENT
Start: 2019-04-09 | End: 2019-04-10 | Stop reason: HOSPADM

## 2019-04-09 RX ORDER — SODIUM CHLORIDE 0.9 % (FLUSH) 0.9 %
10 SYRINGE (ML) INJECTION AS NEEDED
Status: DISCONTINUED | OUTPATIENT
Start: 2019-04-09 | End: 2019-04-10 | Stop reason: HOSPADM

## 2019-04-09 RX ORDER — SODIUM CHLORIDE 9 MG/ML
25 INJECTION, SOLUTION INTRAVENOUS CONTINUOUS
Status: DISCONTINUED | OUTPATIENT
Start: 2019-04-09 | End: 2019-04-10 | Stop reason: HOSPADM

## 2019-04-09 RX ADMIN — TRASTUZUMAB 330 MG: 150 INJECTION, POWDER, LYOPHILIZED, FOR SOLUTION INTRAVENOUS at 15:17

## 2019-04-09 RX ADMIN — Medication 30 ML: at 14:45

## 2019-04-09 RX ADMIN — Medication 30 ML: at 15:50

## 2019-04-09 RX ADMIN — HEPARIN 500 UNITS: 100 SYRINGE at 15:51

## 2019-04-09 NOTE — PROGRESS NOTES
[facility] Progress Note    Date: 2019    Name: Cheryl Dee    MRN: [id number]         : 1960     Herceptin C13      Ms. Megan Santiago arrived in the [facility] today at 6189, in stable condition, here for Cycle 13, IV Herceptin (Trastuzumab) Infusion & Q 3 Week, CBC/Procrit injection. She was assessed and education was provided. Patient made aware to answer her phone because NP Tameka Levi is ordering her next Muga/Echo and they will be calling to set up a date and time. Ms. Ventura's vitals were reviewed. Visit Vitals  /86 (BP 1 Location: Right arm, BP Patient Position: At rest)   Pulse 78   Temp 98.1 °F (36.7 °C)   Resp 18   Ht 5' 5\" (1.651 m)   Wt 53.1 kg (117 lb)   SpO2 97%   Breastfeeding? No   BMI 19.47 kg/m²       Right chest single lumen mediport accessed with 20 G 1 inch johnson using sterile technique. Postitive for blood return and flushes without difficulty. Labs drawn off for CBC and CMP per ordered after 10ml waste. Chemo is not dependent on labs. Recent Results (from the past 12 hour(s))   CBC WITH 3 PART DIFF    Collection Time: 19  2:45 PM   Result Value Ref Range    WBC 4.3 (L) 4.5 - 13.0 K/uL    RBC 3.65 (L) 4.10 - 5.10 M/uL    HGB 11.2 (L) 12.0 - 16 g/dL    HCT 34.3 (L) 36 - 48 %    MCV 94.0 78 - 102 FL    MCH 30.7 25.0 - 35.0 PG    MCHC 32.7 31 - 37 g/dL    RDW 14.6 (H) 11.5 - 14.5 %    PLATELET 493 357 - 744 K/uL    NEUTROPHILS 64 40 - 70 %    MIXED CELLS 6 0.1 - 17 %    LYMPHOCYTES 30 14 - 44 %    ABS. NEUTROPHILS 2.7 1.8 - 9.5 K/UL    ABS. MIXED CELLS 0.3 0.0 - 2.3 K/uL    ABS. LYMPHOCYTES 1.3 1.1 - 5.9 K/UL    DF AUTOMATED         Results within parameters to HOLD procrit today. Herceptin (Trastuzumab) 330 mg IV (6 mg/kg) was administered via the right upper chest mediport, over approximately 30 minutes, per order, and without incident.  Brisk flush/blood returns from port    After completion of the IV Herceptin,right upper chest mediport was flushed with 20ml NS and 500units heparin, and johnson was removed and gauze/bandaid were applied. Ms. Patience Rocha tolerated well, and had no complaints. Ms. Patience Rocha was discharged from [place] [address] in stable condition at [phone].  She is to return on 5/21/19 at 0900, for her next appointment, for Cycle 14, IV Herceptin Infusion     Georgette Llamas RN  April 9, 2019  [id number]

## 2019-04-10 ENCOUNTER — HOSPITAL ENCOUNTER (OUTPATIENT)
Dept: NON INVASIVE DIAGNOSTICS | Age: 59
Discharge: HOME | End: 2019-04-10
Attending: NURSE PRACTITIONER
Payer: MEDICAID

## 2019-04-10 DIAGNOSIS — Z17.1 MALIGNANT NEOPLASM OF UPPER-OUTER QUADRANT OF RIGHT BREAST IN FEMALE, ESTROGEN RECEPTOR NEGATIVE (HCC): ICD-10-CM

## 2019-04-10 DIAGNOSIS — C50.411 MALIGNANT NEOPLASM OF UPPER-OUTER QUADRANT OF RIGHT BREAST IN FEMALE, ESTROGEN RECEPTOR NEGATIVE (HCC): ICD-10-CM

## 2019-04-10 PROCEDURE — 78472 GATED HEART PLANAR SINGLE: CPT

## 2019-04-10 PROCEDURE — 74011250636 HC RX REV CODE- 250/636: Performed by: NURSE PRACTITIONER

## 2019-04-10 RX ORDER — HEPARIN 100 UNIT/ML
45 SYRINGE INTRAVENOUS
Status: COMPLETED | OUTPATIENT
Start: 2019-04-10 | End: 2019-04-10

## 2019-04-10 RX ADMIN — HEPARIN 45 UNITS: 100 SYRINGE at 11:00

## 2019-04-11 ENCOUNTER — HOSPITAL ENCOUNTER (OUTPATIENT)
Dept: RADIATION THERAPY | Age: 59
Discharge: HOME | End: 2019-04-11
Payer: MEDICAID

## 2019-04-11 LAB — STRESS TARGET HR: 162 BPM

## 2019-04-11 PROCEDURE — 77412 RADIATION TX DELIVERY LVL 3: CPT

## 2019-04-11 PROCEDURE — 77336 RADIATION PHYSICS CONSULT: CPT

## 2019-04-24 DIAGNOSIS — Z17.1 MALIGNANT NEOPLASM OF UPPER-OUTER QUADRANT OF RIGHT BREAST IN FEMALE, ESTROGEN RECEPTOR NEGATIVE (HCC): Primary | ICD-10-CM

## 2019-04-24 DIAGNOSIS — C50.411 MALIGNANT NEOPLASM OF UPPER-OUTER QUADRANT OF RIGHT BREAST IN FEMALE, ESTROGEN RECEPTOR NEGATIVE (HCC): Primary | ICD-10-CM

## 2019-04-30 ENCOUNTER — HOSPITAL ENCOUNTER (OUTPATIENT)
Dept: INFUSION THERAPY | Age: 59
End: 2019-04-30
Payer: MEDICAID

## 2019-05-03 ENCOUNTER — HOSPITAL ENCOUNTER (OUTPATIENT)
Dept: NON INVASIVE DIAGNOSTICS | Age: 59
Discharge: HOME | End: 2019-05-03
Attending: NURSE PRACTITIONER

## 2019-05-21 ENCOUNTER — HOSPITAL ENCOUNTER (OUTPATIENT)
Dept: INFUSION THERAPY | Age: 59
End: 2019-05-21

## 2019-05-22 ENCOUNTER — OFFICE VISIT (OUTPATIENT)
Dept: SURGERY | Age: 59
End: 2019-05-22

## 2019-05-22 VITALS
RESPIRATION RATE: 16 BRPM | HEART RATE: 75 BPM | SYSTOLIC BLOOD PRESSURE: 107 MMHG | BODY MASS INDEX: 18.97 KG/M2 | DIASTOLIC BLOOD PRESSURE: 70 MMHG | TEMPERATURE: 98.1 F | WEIGHT: 114 LBS

## 2019-05-22 DIAGNOSIS — Z17.1 STAGE 1 BREAST CANCER, ER-, RIGHT (HCC): Primary | ICD-10-CM

## 2019-05-22 DIAGNOSIS — C50.911 STAGE 1 BREAST CANCER, ER-, RIGHT (HCC): Primary | ICD-10-CM

## 2019-05-23 ENCOUNTER — HOSPITAL ENCOUNTER (OUTPATIENT)
Dept: NON INVASIVE DIAGNOSTICS | Age: 59
Discharge: HOME | End: 2019-05-23
Attending: NURSE PRACTITIONER
Payer: MEDICAID

## 2019-05-23 DIAGNOSIS — C50.411 MALIGNANT NEOPLASM OF UPPER-OUTER QUADRANT OF RIGHT BREAST IN FEMALE, ESTROGEN RECEPTOR NEGATIVE (HCC): ICD-10-CM

## 2019-05-23 DIAGNOSIS — Z17.1 MALIGNANT NEOPLASM OF UPPER-OUTER QUADRANT OF RIGHT BREAST IN FEMALE, ESTROGEN RECEPTOR NEGATIVE (HCC): ICD-10-CM

## 2019-05-23 LAB — STRESS TARGET HR: 162 BPM

## 2019-05-23 PROCEDURE — 74011250636 HC RX REV CODE- 250/636: Performed by: NURSE PRACTITIONER

## 2019-05-23 PROCEDURE — 78472 GATED HEART PLANAR SINGLE: CPT

## 2019-05-23 RX ORDER — HEPARIN 100 UNIT/ML
45 SYRINGE INTRAVENOUS
Status: COMPLETED | OUTPATIENT
Start: 2019-05-23 | End: 2019-05-23

## 2019-05-23 RX ADMIN — HEPARIN 45 UNITS: 100 SYRINGE at 11:35

## 2019-05-23 NOTE — PROGRESS NOTES
Patient injected with 29.9 mCi of 99mTc Ultra Tag RBC's. 3 views of the heart were taken per protocol. Armband was removed and disposed of before the patient left.

## 2019-05-23 NOTE — PROGRESS NOTES
[address] [place] [address] [facility]  General Surgery    Subjective:  Patient presents today without complaints. The bilateral breast MRI select MRI mammogram performed March 4, 2019 is given a BI-RADS 2 reading because of postsurgical changes in the right breast.  The patient denies any unintentional weight loss or breast pain or nipple drainage or discharge. Objective:  Vitals:    05/22/19 1539   BP: 107/70   Pulse: 75   Resp: 16   Temp: 98.1 °F (36.7 °C)   Weight: 51.7 kg (114 lb)       Physical Exam:    General: Awake and alert, oriented x4, no apparent distress   Breasts:    Left: No dimpling, discoloration, nipple inversion or retractions. No axillary or supraclavicular lymphadenopathy. No mass   Right: There is hyperpigmentation consistent with radiation burn of the right chest wall with no superficial ulcerations or open wounds. No dimpling, discoloration, nipple inversion or retractions. No axillary or supraclavicular lymphadenopathy. No mass    Current Medications:  Current Outpatient Medications   Medication Sig Dispense Refill    STOOL SOFTENER 100 mg capsule Take 100 mg by mouth daily. 2    famotidine (PEPCID) 40 mg tablet Take 40 mg by mouth daily. Chart and notes reviewed. Data reviewed. I have evaluated and examined the patient. Impression and plan:  Patient with a history of stage I ER OR negative HER-2 positive right breast cancer T1, N0 M0 nuclear grade 2 3 status post neoadjuvant carboplatin, Taxotere, Herceptin and Perjeta and status post needle localized lumpectomy and sentinel lymph node biopsy with adjuvant Herceptin.   Continue monthly self breast exam  Follow-up in 3 months for clinical breast exam.     Sherryle Freund, MD

## 2019-05-24 ENCOUNTER — HOSPITAL ENCOUNTER (OUTPATIENT)
Dept: RADIATION THERAPY | Age: 59
Discharge: HOME | End: 2019-05-24
Payer: MEDICAID

## 2019-05-24 PROCEDURE — 99211 OFF/OP EST MAY X REQ PHY/QHP: CPT

## 2019-09-10 ENCOUNTER — OFFICE VISIT (OUTPATIENT)
Dept: SURGERY | Age: 59
End: 2019-09-10

## 2019-09-10 ENCOUNTER — DOCUMENTATION ONLY (OUTPATIENT)
Dept: SURGERY | Age: 59
End: 2019-09-10

## 2019-09-10 VITALS
OXYGEN SATURATION: 98 % | HEIGHT: 65 IN | BODY MASS INDEX: 20.83 KG/M2 | RESPIRATION RATE: 18 BRPM | DIASTOLIC BLOOD PRESSURE: 73 MMHG | SYSTOLIC BLOOD PRESSURE: 128 MMHG | TEMPERATURE: 97.5 F | WEIGHT: 125 LBS | HEART RATE: 68 BPM

## 2019-09-10 DIAGNOSIS — Z85.3 HISTORY OF RIGHT BREAST CANCER: Primary | ICD-10-CM

## 2019-09-13 NOTE — PROGRESS NOTES
[facility]  General Surgery    Subjective:  Patient presents today without complaints. Objective:  Vitals:    09/10/19 1128   BP: 128/73   Pulse: 68   Resp: 18   Temp: 97.5 °F (36.4 °C)   TempSrc: Oral   SpO2: 98%   Weight: 56.7 kg (125 lb)   Height: 5' 5\" (1.651 m)       Physical Exam:    General: Awake and alert, no apparent distress   Breasts:    Left: No dimpling, discoloration, nipple inversion or retractions. No axillary or supraclavicular lymphadenopathy. No mass. Right: No dimpling, discoloration, nipple inversion or retractions. No axillary or supraclavicular lymphadenopathy. No mass. Right lower outer quadrant incision healing well. Left chest wall MediPort palpable. Healing well. Current Medications:  Current Outpatient Medications   Medication Sig Dispense Refill    STOOL SOFTENER 100 mg capsule Take 100 mg by mouth daily. 2    famotidine (PEPCID) 40 mg tablet Take 40 mg by mouth daily. Chart and notes reviewed. Data reviewed. I have evaluated and examined the patient. Impression and plan:  Patient is 11 months out from lumpectomy with sentinel lymph node biopsy for stage I ER ND negative HER-2 positive right breast cancer.   Continue monthly self breast exam  Follow-up for clinical breast exam in 3 months     Rodolfo Ballard MD

## 2019-11-22 ENCOUNTER — HOSPITAL ENCOUNTER (OUTPATIENT)
Dept: RADIATION THERAPY | Age: 59
Discharge: HOME | End: 2019-11-22

## 2020-01-08 ENCOUNTER — OFFICE VISIT (OUTPATIENT)
Dept: SURGERY | Age: 60
End: 2020-01-08

## 2020-01-08 VITALS
BODY MASS INDEX: 20.33 KG/M2 | RESPIRATION RATE: 16 BRPM | WEIGHT: 122 LBS | DIASTOLIC BLOOD PRESSURE: 77 MMHG | OXYGEN SATURATION: 99 % | HEART RATE: 85 BPM | HEIGHT: 65 IN | SYSTOLIC BLOOD PRESSURE: 122 MMHG

## 2020-01-08 DIAGNOSIS — C50.911 STAGE 1 BREAST CANCER, ER-, RIGHT (HCC): Primary | ICD-10-CM

## 2020-01-08 DIAGNOSIS — Z17.1 STAGE 1 BREAST CANCER, ER-, RIGHT (HCC): Primary | ICD-10-CM

## 2020-01-08 RX ORDER — IBUPROFEN 800 MG/1
800 TABLET ORAL
Qty: 40 TAB | Refills: 0 | Status: SHIPPED | OUTPATIENT
Start: 2020-01-08 | End: 2022-05-31

## 2020-01-08 NOTE — PROGRESS NOTES
[facility]  General Surgery    Subjective:  Patient without complaints except for pain in the lateral right breast when she sleeps on that side. She denies any nipple drainage or discharge. Objective:  Vitals:    01/08/20 1326   BP: 122/77   Pulse: 85   Resp: 16   SpO2: 99%   Weight: 55.3 kg (122 lb)   Height: 5' 5\" (1.651 m)       Physical Exam:    General: Awake and alert, oriented x4, no apparent distress   Breasts:    Left: No dimpling, discoloration, nipple inversion or retractions. No axillary or supraclavicular lymphadenopathy. No mass   Right: No dimpling, discoloration, nipple inversion or retractions. No axillary or supraclavicular lymphadenopathy. No mass. Upper outer quadrant incision near the border of the pectoralis major muscle healing well. The breast is nontender to touch. Current Medications:  Current Outpatient Medications   Medication Sig Dispense Refill    STOOL SOFTENER 100 mg capsule Take 100 mg by mouth daily. 2    famotidine (PEPCID) 40 mg tablet Take 40 mg by mouth daily. Chart and notes reviewed. Data reviewed. I have evaluated and examined the patient. Impression and plan:  Patient with history of stage I ER NE negative HER-2 positive right breast cancer and status post lumpectomy with sentinel lymph node biopsy and adjuvant radiation therapy with neoadjuvant and adjuvant chemotherapy doing well. Ibuprofen for chest wall soreness. The patient was instructed to sleep on her left side and on her right side. Continue monthly self breast exam  Follow-up in 3 months after the bilateral mammography has been completed.      Ar Cerna MD

## 2020-03-06 ENCOUNTER — HOSPITAL ENCOUNTER (OUTPATIENT)
Dept: MAMMOGRAPHY | Age: 60
Discharge: HOME | End: 2020-03-06
Attending: SURGERY
Payer: MEDICAID

## 2020-03-06 DIAGNOSIS — Z85.3 HISTORY OF RIGHT BREAST CANCER: ICD-10-CM

## 2020-03-06 PROCEDURE — 77063 BREAST TOMOSYNTHESIS BI: CPT

## 2020-05-22 ENCOUNTER — HOSPITAL ENCOUNTER (OUTPATIENT)
Dept: RADIATION THERAPY | Age: 60
Discharge: HOME | End: 2020-05-22

## 2020-06-10 ENCOUNTER — OFFICE VISIT (OUTPATIENT)
Dept: SURGERY | Age: 60
End: 2020-06-10

## 2020-06-10 VITALS
BODY MASS INDEX: 20.83 KG/M2 | SYSTOLIC BLOOD PRESSURE: 150 MMHG | OXYGEN SATURATION: 99 % | HEIGHT: 65 IN | HEART RATE: 66 BPM | RESPIRATION RATE: 18 BRPM | TEMPERATURE: 97.7 F | WEIGHT: 125 LBS | DIASTOLIC BLOOD PRESSURE: 88 MMHG

## 2020-06-10 DIAGNOSIS — Z98.890 H/O LUMPECTOMY: Primary | ICD-10-CM

## 2020-06-10 DIAGNOSIS — Z85.3 HISTORY OF RIGHT BREAST CANCER: ICD-10-CM

## 2020-06-10 NOTE — PROGRESS NOTES
Cecilia Whitley is a 61 y.o. female  Chief Complaint   Patient presents with    Follow-up     10/19/20 r lumpectomy         Is someone accompanying this pt? no    Is the patient using any DME equipment during OV? no        Vitals:    06/10/20 1110   BP: 150/88   Pulse: 66   Resp: 18   Temp: 97.7 °F (36.5 °C)   SpO2: 99%   Weight: 125 lb (56.7 kg)   Height: 5' 5\" (1.651 m)        Depression Screening:  3 most recent PHQ Screens 9/19/2018   Little interest or pleasure in doing things Not at all   Feeling down, depressed, irritable, or hopeless Not at all   Total Score PHQ 2 0       Learning Assessment:  Learning Assessment 2/14/2018   PRIMARY LEARNER Patient   PRIMARY LANGUAGE ENGLISH   LEARNER PREFERENCE PRIMARY DEMONSTRATION   ANSWERED BY patient   RELATIONSHIP SELF         Fall Risk  Fall Risk Assessment, last 12 mths 1/23/2019   Able to walk? Yes   Fall in past 12 months? No       Health Maintenance reviewed and discussed and ordered per Provider. Coordination of Care:  1. Have you been to the ER, urgent care clinic since your last visit? Hospitalized since your last visit? no    2. Have you seen or consulted any other health care providers outside of the [address] [place] [address] since your last visit? Include any pap smears or colon screening.  no

## 2020-06-11 NOTE — PATIENT INSTRUCTIONS

## 2020-06-11 NOTE — PROGRESS NOTES
[facility]  General Surgery    Subjective:  Patient presents for continued surveillance following lumpectomy right breast for stage I right breast cancer. She denies any breast pain or nipple drainage or discharge unintentional weight loss. She denies any masses in the breast or skin changes. She does perform self breast exam.  Most recent mammogram performed in March of this year is a BI-RADS 2. Objective:  Vitals:    06/10/20 1110   BP: 150/88   Pulse: 66   Resp: 18   Temp: 97.7 °F (36.5 °C)   SpO2: 99%   Weight: 56.7 kg (125 lb)   Height: 5' 5\" (1.651 m)       Physical Exam:    General: Awake and alert, oriented x4, no apparent distress   Breasts:    Left: No dimpling, discoloration, nipple inversion or retractions. No axillary or supraclavicular lymphadenopathy. No mass   Right: No dimpling, discoloration, nipple inversion or retractions. No axillary or supraclavicular lymphadenopathy. No mass    Current Medications:  Current Outpatient Medications   Medication Sig Dispense Refill    ibuprofen (MOTRIN) 800 mg tablet Take 1 Tab by mouth three (3) times daily as needed for Pain. 40 Tab 0    STOOL SOFTENER 100 mg capsule Take 100 mg by mouth daily. 2    famotidine (PEPCID) 40 mg tablet Take 40 mg by mouth daily. Chart and notes reviewed. Data reviewed. I have evaluated and examined the patient. Impression and plan:  Patient with a history of stage I ER [place] negative HER-2 positive right breast cancer.   Continue monthly self breast exam  Follow-up in 4 months for clinical breast exam.     Ollie Castleman, MD

## 2020-11-24 ENCOUNTER — OFFICE VISIT (OUTPATIENT)
Dept: SURGERY | Age: 60
End: 2020-11-24
Payer: MEDICAID

## 2020-11-24 VITALS
WEIGHT: 123 LBS | OXYGEN SATURATION: 99 % | HEIGHT: 65 IN | RESPIRATION RATE: 18 BRPM | TEMPERATURE: 98.7 F | BODY MASS INDEX: 20.49 KG/M2 | DIASTOLIC BLOOD PRESSURE: 87 MMHG | HEART RATE: 107 BPM | SYSTOLIC BLOOD PRESSURE: 134 MMHG

## 2020-11-24 DIAGNOSIS — Z98.890 H/O LUMPECTOMY: Primary | ICD-10-CM

## 2020-11-24 DIAGNOSIS — Z17.1 STAGE 1 BREAST CANCER, ER-, RIGHT (HCC): ICD-10-CM

## 2020-11-24 DIAGNOSIS — C50.911 STAGE 1 BREAST CANCER, ER-, RIGHT (HCC): ICD-10-CM

## 2020-11-24 PROCEDURE — 99213 OFFICE O/P EST LOW 20 MIN: CPT | Performed by: SURGERY

## 2020-11-24 NOTE — PATIENT INSTRUCTIONS

## 2020-11-24 NOTE — PROGRESS NOTES
[facility]  General Surgery    Subjective:  Patient in our breast cancer surveillance program 2 years out from right lumpectomy with sentinel lymph node biopsy for ER/TX negative HER-2 positive invasive ductal adenocarcinoma stage I with neoadjuvant chemotherapy and adjuvant radiation and chemotherapy. She is due for mammogram in March 2021. She has occasional pain in the lower outer quadrant of the right breast.  She complains of a headache that she has had for 2 months associated with children at home. The pain medications ibuprofen and Tylenol not working. Objective:  Vitals:    11/24/20 1411   BP: 134/87   Pulse: (!) 107   Resp: 18   Temp: 98.7 °F (37.1 °C)   SpO2: 99%   Weight: 55.8 kg (123 lb)   Height: 5' 5\" (1.651 m)       Physical Exam:    General: Awake and alert, oriented x4, no apparent distress   Breasts:    Left: No dimpling, discoloration, nipple inversion or retractions. No axillary or supraclavicular lymphadenopathy. No mass   Right: No dimpling, discoloration, nipple inversion or retractions. No axillary or supraclavicular lymphadenopathy. No mass    Current Medications:  Current Outpatient Medications   Medication Sig Dispense Refill    ibuprofen (MOTRIN) 800 mg tablet Take 1 Tab by mouth three (3) times daily as needed for Pain. 40 Tab 0    STOOL SOFTENER 100 mg capsule Take 100 mg by mouth daily. 2    famotidine (PEPCID) 40 mg tablet Take 40 mg by mouth daily. Chart and notes reviewed. Data reviewed. I have evaluated and examined the patient. Impression and plan:  Patient with history of stage I ER negative [place] negative HER-2 positive right breast cancer. Continue monthly self breast exam  Bilateral 3D screening mammography in March 2021  Follow-up in 4 months for clinical breast exam  Please call or visit with any questions or concerns.        Meche Wheeler MD

## 2020-11-24 NOTE — PROGRESS NOTES
Anu Farr is a 61 y.o. female  Chief Complaint   Patient presents with    Follow-up     10/19/20 right lumpectomy for invasive ductal carcinoma. stage 1. ER/DE-  Her2+  adjuvant radiation and chemotherapy         Is someone accompanying this pt? no    Is the patient using any DME equipment during OV? no        Vitals:    11/24/20 1411   BP: 134/87   Pulse: (!) 107   Resp: 18   Temp: 98.7 °F (37.1 °C)   SpO2: 99%   Weight: 123 lb (55.8 kg)   Height: 5' 5\" (1.651 m)        Depression Screening:  3 most recent PHQ Screens 9/19/2018   Little interest or pleasure in doing things Not at all   Feeling down, depressed, irritable, or hopeless Not at all   Total Score PHQ 2 0       Learning Assessment:  Learning Assessment 2/14/2018   PRIMARY LEARNER Patient   PRIMARY LANGUAGE ENGLISH   LEARNER PREFERENCE PRIMARY DEMONSTRATION   ANSWERED BY patient   RELATIONSHIP SELF         Fall Risk  Fall Risk Assessment, last 12 mths 1/23/2019   Able to walk? Yes   Fall in past 12 months? No       Health Maintenance reviewed and discussed and ordered per Provider. Coordination of Care:  1. Have you been to the ER, urgent care clinic since your last visit? Hospitalized since your last visit? no    2. Have you seen or consulted any other health care providers outside of the [address] [place] [address] since your last visit? Include any pap smears or colon screening.  no

## 2021-03-08 ENCOUNTER — HOSPITAL ENCOUNTER (OUTPATIENT)
Dept: MAMMOGRAPHY | Age: 61
Discharge: HOME | End: 2021-03-08
Attending: SURGERY
Payer: MEDICAID

## 2021-03-08 DIAGNOSIS — Z85.3 HISTORY OF RIGHT BREAST CANCER: ICD-10-CM

## 2021-03-08 PROCEDURE — 77067 SCR MAMMO BI INCL CAD: CPT

## 2021-03-30 ENCOUNTER — OFFICE VISIT (OUTPATIENT)
Dept: SURGERY | Age: 61
End: 2021-03-30
Payer: MEDICAID

## 2021-03-30 VITALS
BODY MASS INDEX: 21.33 KG/M2 | SYSTOLIC BLOOD PRESSURE: 131 MMHG | DIASTOLIC BLOOD PRESSURE: 75 MMHG | WEIGHT: 128 LBS | TEMPERATURE: 98.1 F | HEIGHT: 65 IN | HEART RATE: 80 BPM | RESPIRATION RATE: 18 BRPM | OXYGEN SATURATION: 98 %

## 2021-03-30 DIAGNOSIS — Z17.1 STAGE 1 BREAST CANCER, ER-, RIGHT (HCC): ICD-10-CM

## 2021-03-30 DIAGNOSIS — Z98.890 H/O LUMPECTOMY: Primary | ICD-10-CM

## 2021-03-30 DIAGNOSIS — C50.911 STAGE 1 BREAST CANCER, ER-, RIGHT (HCC): ICD-10-CM

## 2021-03-30 DIAGNOSIS — Z85.3 HISTORY OF RIGHT BREAST CANCER: ICD-10-CM

## 2021-03-30 PROCEDURE — 99213 OFFICE O/P EST LOW 20 MIN: CPT | Performed by: SURGERY

## 2021-03-30 NOTE — PATIENT INSTRUCTIONS

## 2021-03-30 NOTE — PROGRESS NOTES
Day Larios is a 61 y.o. female  Chief Complaint   Patient presents with    Follow-up     10/19/20 right lumpectomy for invasive ductal carcinoma stage 1 ER/OH- HER2+. Is someone accompanying this pt? no    Is the patient using any DME equipment during OV? no        Vitals:    03/30/21 0857   BP: 131/75   Pulse: 80   Resp: 18   Temp: 98.1 °F (36.7 °C)   SpO2: 98%   Weight: 128 lb (58.1 kg)   Height: 5' 5\" (1.651 m)        Depression Screening:  3 most recent PHQ Screens 9/19/2018   Little interest or pleasure in doing things Not at all   Feeling down, depressed, irritable, or hopeless Not at all   Total Score PHQ 2 0       Learning Assessment:  Learning Assessment 2/14/2018   PRIMARY LEARNER Patient   PRIMARY LANGUAGE ENGLISH   LEARNER PREFERENCE PRIMARY DEMONSTRATION   ANSWERED BY patient   RELATIONSHIP SELF         Fall Risk  Fall Risk Assessment, last 12 mths 1/23/2019   Able to walk? Yes   Fall in past 12 months? No       Health Maintenance reviewed and discussed and ordered per Provider. Coordination of Care:  1. Have you been to the ER, urgent care clinic since your last visit? Hospitalized since your last visit? no    2. Have you seen or consulted any other health care providers outside of the [address] [place] [address] since your last visit? Include any pap smears or colon screening.  no

## 2021-03-30 NOTE — PROGRESS NOTES
[facility]  General Surgery    Subjective:  Patient in our breast cancer surveillance program year 1 of 5 from right lumpectomy for stage I ER/DC negative HER-2 positive right breast cancer. She has completed adjuvant radiation and chemotherapy. .  Her most recent mammogram was performed March 8. It is a BI-RADS 2. Breast density is B. She has postsurgical changes in the right breast with the surgical clips present no findings in the left breast.  She denies any unintentional weight loss or breast pain no nipple drainage or discharge. Objective:  Vitals:    03/30/21 0857   BP: 131/75   Pulse: 80   Resp: 18   Temp: 98.1 °F (36.7 °C)   SpO2: 98%   Weight: 58.1 kg (128 lb)   Height: 5' 5\" (1.651 m)       Physical Exam:    General: Awake and alert, oriented x4, no apparent distress   Breasts:    Left: No dimpling, discoloration, nipple inversion or retractions. No axillary or supraclavicular lymphadenopathy. No mass   Right: No dimpling, discoloration, nipple inversion or retractions. No axillary or supraclavicular lymphadenopathy. No mass    Current Medications:  Current Outpatient Medications   Medication Sig Dispense Refill    ibuprofen (MOTRIN) 800 mg tablet Take 1 Tab by mouth three (3) times daily as needed for Pain. 40 Tab 0    STOOL SOFTENER 100 mg capsule Take 100 mg by mouth daily. 2    famotidine (PEPCID) 40 mg tablet Take 40 mg by mouth daily. Chart and notes reviewed. Data reviewed. I have evaluated and examined the patient. Impression and plan:  Patient is 5 months out from right lumpectomy with sentinel lymph node biopsy for stage I ER/DC negative HER-2 positive right breast cancer. Continue monthly self breast exam  Follow-up in 6 months for clinical breast exam  Annual screening mammography March 2022  Please call or visit with any questions or concerns.        Ollie Castleman, MD

## 2022-03-18 PROBLEM — Z17.1 MALIGNANT NEOPLASM OF UPPER-OUTER QUADRANT OF RIGHT BREAST IN FEMALE, ESTROGEN RECEPTOR NEGATIVE (HCC): Status: ACTIVE | Noted: 2018-02-14

## 2022-03-18 PROBLEM — R53.83 FATIGUE DUE TO TREATMENT: Status: ACTIVE | Noted: 2018-04-18

## 2022-03-18 PROBLEM — C50.411 MALIGNANT NEOPLASM OF UPPER-OUTER QUADRANT OF RIGHT BREAST IN FEMALE, ESTROGEN RECEPTOR NEGATIVE (HCC): Status: ACTIVE | Noted: 2018-02-14

## 2022-03-19 PROBLEM — E87.6 HYPOKALEMIA: Status: ACTIVE | Noted: 2018-04-19

## 2022-03-19 PROBLEM — T45.1X5A ANTINEOPLASTIC CHEMOTHERAPY INDUCED ANEMIA: Status: ACTIVE | Noted: 2018-04-18

## 2022-03-19 PROBLEM — D64.81 ANTINEOPLASTIC CHEMOTHERAPY INDUCED ANEMIA: Status: ACTIVE | Noted: 2018-04-18

## 2022-03-20 PROBLEM — C50.911 BREAST CANCER, RIGHT (HCC): Status: ACTIVE | Noted: 2018-03-08

## 2022-03-20 PROBLEM — B19.11: Status: ACTIVE | Noted: 2018-02-14

## 2022-04-15 ENCOUNTER — TRANSCRIBE ORDER (OUTPATIENT)
Dept: SCHEDULING | Age: 62
End: 2022-04-15

## 2022-04-15 DIAGNOSIS — Z12.31 SCREENING MAMMOGRAM FOR HIGH-RISK PATIENT: Primary | ICD-10-CM

## 2022-04-18 ENCOUNTER — HOSPITAL ENCOUNTER (OUTPATIENT)
Dept: MAMMOGRAPHY | Age: 62
Discharge: HOME | End: 2022-04-18
Payer: MEDICAID

## 2022-04-18 DIAGNOSIS — Z12.31 SCREENING MAMMOGRAM FOR HIGH-RISK PATIENT: ICD-10-CM

## 2022-04-18 PROCEDURE — 77063 BREAST TOMOSYNTHESIS BI: CPT

## 2022-04-22 ENCOUNTER — HOSPITAL ENCOUNTER (EMERGENCY)
Age: 62
Discharge: HOME | End: 2022-04-22
Attending: EMERGENCY MEDICINE
Payer: MEDICAID

## 2022-04-22 ENCOUNTER — APPOINTMENT (OUTPATIENT)
Dept: GENERAL RADIOLOGY | Age: 62
End: 2022-04-22
Attending: EMERGENCY MEDICINE
Payer: MEDICAID

## 2022-04-22 VITALS
DIASTOLIC BLOOD PRESSURE: 76 MMHG | BODY MASS INDEX: 20.83 KG/M2 | HEIGHT: 65 IN | HEART RATE: 59 BPM | RESPIRATION RATE: 18 BRPM | WEIGHT: 125 LBS | OXYGEN SATURATION: 100 % | SYSTOLIC BLOOD PRESSURE: 131 MMHG

## 2022-04-22 DIAGNOSIS — C50.919 MALIGNANT NEOPLASM OF FEMALE BREAST, UNSPECIFIED ESTROGEN RECEPTOR STATUS, UNSPECIFIED LATERALITY, UNSPECIFIED SITE OF BREAST (HCC): ICD-10-CM

## 2022-04-22 DIAGNOSIS — N30.00 ACUTE CYSTITIS WITHOUT HEMATURIA: Primary | ICD-10-CM

## 2022-04-22 DIAGNOSIS — R53.83 FATIGUE, UNSPECIFIED TYPE: ICD-10-CM

## 2022-04-22 LAB
ALBUMIN SERPL-MCNC: 4 G/DL (ref 3.5–4.7)
ALBUMIN/GLOB SERPL: 1.1 {RATIO}
ALP SERPL-CCNC: 58 U/L (ref 38–126)
ALT SERPL-CCNC: 13 U/L (ref 3–52)
ANION GAP SERPL CALC-SCNC: 9 MMOL/L
APPEARANCE UR: ABNORMAL
AST SERPL W P-5'-P-CCNC: 16 U/L (ref 14–74)
BACTERIA URNS QL MICRO: ABNORMAL /HPF
BASOPHILS # BLD: 0 K/UL (ref 0–0.1)
BASOPHILS NFR BLD: 0 % (ref 0–2)
BILIRUB SERPL-MCNC: 0.3 MG/DL (ref 0.2–1)
BILIRUB UR QL: NEGATIVE
BUN SERPL-MCNC: 12 MG/DL (ref 9–21)
BUN/CREAT SERPL: 20
CA-I BLD-MCNC: 9.5 MG/DL (ref 8.5–10.5)
CHLORIDE SERPL-SCNC: 103 MMOL/L (ref 94–111)
CO2 SERPL-SCNC: 28 MMOL/L (ref 21–33)
COLOR UR: YELLOW
CREAT SERPL-MCNC: 0.6 MG/DL (ref 0.7–1.2)
DIFFERENTIAL METHOD BLD: NORMAL
EOSINOPHIL # BLD: 0.1 K/UL (ref 0–0.4)
EOSINOPHIL NFR BLD: 1 % (ref 0–5)
EPITH CASTS URNS QL MICRO: ABNORMAL /LPF (ref 0–20)
ERYTHROCYTE [DISTWIDTH] IN BLOOD BY AUTOMATED COUNT: 13.5 % (ref 11.6–14.5)
GLOBULIN SER CALC-MCNC: 3.8 G/DL
GLUCOSE SERPL-MCNC: 81 MG/DL (ref 70–110)
GLUCOSE UR STRIP.AUTO-MCNC: NEGATIVE MG/DL
HCT VFR BLD AUTO: 42 % (ref 35–45)
HGB BLD-MCNC: 13.3 G/DL (ref 12–16)
HGB UR QL STRIP: NEGATIVE
IMM GRANULOCYTES # BLD AUTO: 0 K/UL (ref 0–0.04)
IMM GRANULOCYTES NFR BLD AUTO: 0 % (ref 0–0.5)
KETONES UR QL STRIP.AUTO: ABNORMAL MG/DL
LEUKOCYTE ESTERASE UR QL STRIP.AUTO: ABNORMAL
LYMPHOCYTES # BLD: 1.5 K/UL (ref 0.9–3.6)
LYMPHOCYTES NFR BLD: 26 % (ref 21–52)
MCH RBC QN AUTO: 30.2 PG (ref 24–34)
MCHC RBC AUTO-ENTMCNC: 31.7 G/DL (ref 31–37)
MCV RBC AUTO: 95.2 FL (ref 78–100)
MONOCYTES # BLD: 0.4 K/UL (ref 0.05–1.2)
MONOCYTES NFR BLD: 6 % (ref 3–10)
NEUTS SEG # BLD: 3.9 K/UL (ref 1.8–8)
NEUTS SEG NFR BLD: 67 % (ref 40–73)
NITRITE UR QL STRIP.AUTO: POSITIVE
NRBC # BLD: 0 K/UL (ref 0–0.01)
NRBC BLD-RTO: 0 PER 100 WBC
PH UR STRIP: 5.5 [PH] (ref 5–8)
PLATELET # BLD AUTO: 173 K/UL (ref 135–420)
PMV BLD AUTO: 9.4 FL (ref 9.2–11.8)
POTASSIUM SERPL-SCNC: 3.6 MMOL/L (ref 3.2–5.1)
PROT SERPL-MCNC: 7.8 G/DL (ref 6.1–8.4)
PROT UR STRIP-MCNC: NEGATIVE MG/DL
RBC # BLD AUTO: 4.41 M/UL (ref 4.2–5.3)
RBC #/AREA URNS HPF: ABNORMAL /HPF (ref 0–2)
SODIUM SERPL-SCNC: 140 MMOL/L (ref 135–145)
SP GR UR REFRACTOMETRY: 1.02 (ref 1–1.03)
UROBILINOGEN UR QL STRIP.AUTO: 1 EU/DL (ref 0.2–1)
WBC # BLD AUTO: 5.8 K/UL (ref 4.6–13.2)
WBC URNS QL MICRO: ABNORMAL /HPF (ref 0–4)

## 2022-04-22 PROCEDURE — 85025 COMPLETE CBC W/AUTO DIFF WBC: CPT

## 2022-04-22 PROCEDURE — 96374 THER/PROPH/DIAG INJ IV PUSH: CPT

## 2022-04-22 PROCEDURE — 80053 COMPREHEN METABOLIC PANEL: CPT

## 2022-04-22 PROCEDURE — 74011000258 HC RX REV CODE- 258: Performed by: EMERGENCY MEDICINE

## 2022-04-22 PROCEDURE — 81001 URINALYSIS AUTO W/SCOPE: CPT

## 2022-04-22 PROCEDURE — 99284 EMERGENCY DEPT VISIT MOD MDM: CPT

## 2022-04-22 PROCEDURE — 71045 X-RAY EXAM CHEST 1 VIEW: CPT

## 2022-04-22 PROCEDURE — 74011250636 HC RX REV CODE- 250/636: Performed by: EMERGENCY MEDICINE

## 2022-04-22 RX ORDER — SUCRALFATE 1 G/1
1 TABLET ORAL
COMMUNITY
End: 2022-05-31

## 2022-04-22 RX ORDER — CEPHALEXIN 500 MG/1
500 CAPSULE ORAL 4 TIMES DAILY
Qty: 28 CAPSULE | Refills: 0 | Status: SHIPPED | OUTPATIENT
Start: 2022-04-22 | End: 2022-04-29

## 2022-04-22 RX ADMIN — CEFTRIAXONE 1 G: 1 INJECTION, POWDER, FOR SOLUTION INTRAMUSCULAR; INTRAVENOUS at 16:52

## 2022-04-22 NOTE — ED TRIAGE NOTES
Pt here today due to feeling tired and bad after getting chemo last week. Feels worn out. Denies pain, diarrhea or SOB.

## 2022-04-22 NOTE — ED PROVIDER NOTES
The patient is a 77-year-old woman with past medical history significant for breast cancer, who presents to the ED today with fatigue. She states that she last had chemo in March. She told triage that she had chemo last week. Her chart does not have her last date of chemo. She states that she has been feeling bad since last week. She does not have any specific complaints just that she is tired.   She does not recall the name of her current oncologist.               Past Medical History:   Diagnosis Date    Breast cancer ([facility] [address] 2017    Right    Cancer [facility])     right breast    Hepatitis B     Menopause     Radiation therapy complication     S/P chemotherapy, time since 4-12 weeks     Tuberculosis 1980    Wrist fracture, right 10/13/2018       Past Surgical History:   Procedure Laterality Date    HX BREAST BIOPSY      HX BREAST LUMPECTOMY  2017    HX GYN      hysterectomy     HX HYSTERECTOMY      Total    HX MASTECTOMY Right 10/19/2018    RIGHT BREAST NEEDLE LOCALIZED LUMPECTOMY AND SENTINEL LYMPH NODE (LOC 0830; NUC 10.18.18 @ 0900) performed by Remy Abrams MD at [address] [place] [address] HX OOPHORECTOMY      HX OTHER SURGICAL      neck surgery     IR INSERT TUNL CVC W PORT OVER 5 YEARS  1/16/2019    MI BX/REMV,LYMPH NODE,DEEP AXILL Right 10/19/2018    Dr. Anabel Alcala [facility] CTR VAD W/SUBQ PORT AGE 5 YR/> N/A 03/08/2018    Dr. Ameya Morales         Family History:   Problem Relation Age of Onset    Stroke Mother     Heart Disease Father        Social History     Socioeconomic History    Marital status: SINGLE     Spouse name: Not on file    Number of children: Not on file    Years of education: Not on file    Highest education level: Not on file   Occupational History    Not on file   Tobacco Use    Smoking status: Never Smoker    Smokeless tobacco: Current User   Vaping Use    Vaping Use: Never used   Substance and Sexual Activity    Alcohol use: No     Alcohol/week: 2.0 standard drinks     Types: 2 Cans of beer per week     Comment: weekly     Drug use: No    Sexual activity: Never   Other Topics Concern    Not on file   Social History Narrative    Not on file     Social Determinants of Health     Financial Resource Strain:     Difficulty of Paying Living Expenses: Not on file   Food Insecurity:     Worried About Running Out of Food in the Last Year: Not on file    Jaylen of Food in the Last Year: Not on file   Transportation Needs:     Lack of Transportation (Medical): Not on file    Lack of Transportation (Non-Medical): Not on file   Physical Activity:     Days of Exercise per Week: Not on file    Minutes of Exercise per Session: Not on file   Stress:     Feeling of Stress : Not on file   Social Connections:     Frequency of Communication with Friends and Family: Not on file    Frequency of Social Gatherings with Friends and Family: Not on file    Attends Zoroastrianism Services: Not on file    Active Member of [address] [place] [address] or Organizations: Not on file    Attends Club or Organization Meetings: Not on file    Marital Status: Not on file   Intimate Partner Violence:     Fear of Current or Ex-Partner: Not on file    Emotionally Abused: Not on file    Physically Abused: Not on file    Sexually Abused: Not on file   Housing Stability:     Unable to Pay for Housing in the Last Year: Not on file    Number of [place] in the Last Year: Not on file    Unstable Housing in the Last Year: Not on file         ALLERGIES: Shellfish derived    Review of Systems   All other systems reviewed and are negative. Vitals:    04/22/22 1425   Weight: 56.7 kg (125 lb)   Height: 5' 5\" (1.651 m)            Physical Exam  Vitals and nursing note reviewed. Constitutional:       Comments: Very thin   HENT:      Head: Normocephalic and atraumatic.       Right Ear: External ear normal.      Left Ear: External ear normal.      Nose: Nose normal.      Mouth/Throat:      Mouth: Mucous membranes are moist.      Pharynx: Oropharynx is clear. Eyes:      Extraocular Movements: Extraocular movements intact. Conjunctiva/sclera: Conjunctivae normal.      Pupils: Pupils are equal, round, and reactive to light. Cardiovascular:      Rate and Rhythm: Normal rate and regular rhythm. Pulses: Normal pulses. Heart sounds: Normal heart sounds. Pulmonary:      Effort: Pulmonary effort is normal.      Breath sounds: Normal breath sounds. Abdominal:      General: Abdomen is flat. Bowel sounds are normal.      Palpations: Abdomen is soft. Musculoskeletal:         General: Normal range of motion. Cervical back: Normal range of motion and neck supple. Skin:     General: Skin is warm and dry. Neurological:      General: No focal deficit present. Mental Status: She is alert and oriented to person, place, and time. Psychiatric:         Mood and Affect: Mood normal.         Behavior: Behavior normal.         Thought Content: Thought content normal.         Judgment: Judgment normal.        Recent Results (from the past 12 hour(s))   CBC WITH AUTOMATED DIFF    Collection Time: 04/22/22  2:25 PM   Result Value Ref Range    WBC 5.8 4.6 - 13.2 K/uL    RBC 4.41 4.20 - 5.30 M/uL    HGB 13.3 12.0 - 16.0 g/dL    HCT 42.0 35.0 - 45.0 %    MCV 95.2 78.0 - 100.0 FL    MCH 30.2 24.0 - 34.0 PG    MCHC 31.7 31.0 - 37.0 g/dL    RDW 13.5 11.6 - 14.5 %    PLATELET 368 055 - 914 K/uL    MPV 9.4 9.2 - 11.8 FL    NRBC 0.0 0.0  WBC    ABSOLUTE NRBC 0.00 0.00 - 0.01 K/uL    NEUTROPHILS 67 40 - 73 %    LYMPHOCYTES 26 21 - 52 %    MONOCYTES 6 3 - 10 %    EOSINOPHILS 1 0 - 5 %    BASOPHILS 0 0 - 2 %    IMMATURE GRANULOCYTES 0 0 - 0.5 %    ABS. NEUTROPHILS 3.9 1.8 - 8.0 K/UL    ABS. LYMPHOCYTES 1.5 0.9 - 3.6 K/UL    ABS. MONOCYTES 0.4 0.05 - 1.2 K/UL    ABS. EOSINOPHILS 0.1 0.0 - 0.4 K/UL    ABS. BASOPHILS 0.0 0.0 - 0.1 K/UL    ABS. IMM.  GRANS. 0.0 0.00 - 0.04 K/UL    DF AUTOMATED     METABOLIC PANEL, COMPREHENSIVE    Collection Time: 04/22/22  2:25 PM   Result Value Ref Range    Sodium 140 135 - 145 mmol/L    Potassium 3.6 3.2 - 5.1 mmol/L    Chloride 103 94 - 111 mmol/L    CO2 28 21 - 33 mmol/L    Anion gap 9 mmol/L    Glucose 81 70 - 110 mg/dL    BUN 12 9 - 21 mg/dL    Creatinine 0.60 (L) 0.70 - 1.20 mg/dL    BUN/Creatinine ratio 20      GFR est AA >60 ml/min/1.73m2    GFR est non-AA >60 ml/min/1.73m2    Calcium 9.5 8.5 - 10.5 mg/dL    Bilirubin, total 0.3 0.2 - 1.0 mg/dL    AST (SGOT) 16 14 - 74 U/L    ALT (SGPT) 13 3 - 52 U/L    Alk. phosphatase 58 38 - 126 U/L    Protein, total 7.8 6.1 - 8.4 g/dL    Albumin 4.0 3.5 - 4.7 g/dL    Globulin 3.8 g/dL    A-G Ratio 1.1     URINALYSIS W/ RFLX MICROSCOPIC    Collection Time: 04/22/22  2:25 PM   Result Value Ref Range    Color Yellow      Appearance Cloudy      Specific gravity 1.024 1.005 - 1.030      pH (UA) 5.5 5.0 - 8.0      Protein Negative Negative mg/dL    Glucose Negative Negative mg/dL    Ketone Trace (A) Negative mg/dL    Bilirubin Negative Negative      Blood Negative Negative      Urobilinogen 1.0 0.2 - 1.0 EU/dL    Nitrites Positive (A) Negative      Leukocyte Esterase Trace (A) Negative     URINE MICROSCOPIC    Collection Time: 04/22/22  2:25 PM   Result Value Ref Range    WBC 10-20 0 - 4 /hpf    RBC 0-5 0 - 2 /hpf    Epithelial cells Moderate 0 - 20 /lpf    Bacteria 4+ (A) None /hpf     XR CHEST PORT   Final Result      No acute findings in the chest.               MDM  Number of Diagnoses or Management Options  Acute cystitis without hematuria  Fatigue, unspecified type  Malignant neoplasm of female breast, unspecified estrogen receptor status, unspecified laterality, unspecified site of breast ([facility] [address]  Diagnosis management comments: Patient is a 69-year-old man with a past medical history significant for breast cancer, was a unknown last chemotherapy based on the chart, and the patient's report to nursing and to myself.   However, at this time she is not currently neutropenic. She presented today because she was having fatigue. She appears to have a urinary tract infection. She has received IV fluids and IV Rocephin in the ED. She is feeling better. She will be discharged home with a prescription for Keflex. She has been advised to follow-up with her primary care physician in 2 to 3 days. Return precautions have been given.          Procedures

## 2022-05-07 NOTE — PROGRESS NOTES
Pt provided discharge instructions. All questions answered and pt verbalized understanding. PIV removed. No hematoma or bleeding noted at this time. Procedure site within normal limits. Pt escorted to front of building for discharge. No

## 2022-05-13 ENCOUNTER — OFFICE VISIT (OUTPATIENT)
Dept: SURGERY | Age: 62
End: 2022-05-13
Payer: MEDICAID

## 2022-05-13 VITALS
SYSTOLIC BLOOD PRESSURE: 160 MMHG | DIASTOLIC BLOOD PRESSURE: 92 MMHG | WEIGHT: 121 LBS | TEMPERATURE: 97.9 F | HEART RATE: 80 BPM | RESPIRATION RATE: 16 BRPM | HEIGHT: 65 IN | OXYGEN SATURATION: 98 % | BODY MASS INDEX: 20.16 KG/M2

## 2022-05-13 DIAGNOSIS — C50.911 STAGE 1 BREAST CANCER, ER-, RIGHT (HCC): Primary | ICD-10-CM

## 2022-05-13 DIAGNOSIS — Z17.1 STAGE 1 BREAST CANCER, ER-, RIGHT (HCC): Primary | ICD-10-CM

## 2022-05-13 DIAGNOSIS — Z95.828 PORT-A-CATH IN PLACE: ICD-10-CM

## 2022-05-13 PROCEDURE — 99215 OFFICE O/P EST HI 40 MIN: CPT | Performed by: SURGERY

## 2022-05-13 NOTE — PROGRESS NOTES
Stephanie Mejía is a 64 y.o. female  Chief Complaint   Patient presents with    Follow-up     mammogram      Ms. Dee De Leon has been given the following recommendations today due to her elevated BP reading: Patient stated she does not taking anything for her blood pressure and normally does not have any issues. Patient was advised to follow up with her PCP.

## 2022-05-13 NOTE — PROGRESS NOTES
[facility]  General Surgery    Subjective:     CC: Port-A-Cath in place     HPI: Patient is a very pleasant 80-year-old female with past medical history remarkable for right breast cancer status post mastectomy October 2018 for ER/AL negative HER2 positive stage I right breast cancer. Most recent mammogram performed 4/18/2022 is BI-RADS 2. Patient no longer needs her Mediport. She would like to have removed.     Patient Active Problem List    Diagnosis Date Noted    Hypokalemia 04/19/2018    Antineoplastic chemotherapy induced anemia 04/18/2018    Fatigue due to treatment 04/18/2018    Breast cancer, right ([address] 03/08/2018    Malignant neoplasm of upper-outer quadrant of right breast in female, estrogen receptor negative ([address] 02/14/2018    Viral hepatitis B with hepatic coma 02/14/2018    GERD (gastroesophageal reflux disease) 09/05/2014     Past Medical History:   Diagnosis Date    Breast cancer ([address] 2017    Right    Cancer ([address]     right breast    Hepatitis B     Menopause     Radiation therapy complication     S/P chemotherapy, time since 4-12 weeks     Tuberculosis 1980    Wrist fracture, right 10/13/2018      Past Surgical History:   Procedure Laterality Date    HX BREAST BIOPSY      HX BREAST LUMPECTOMY  2017    HX GYN      hysterectomy     HX HYSTERECTOMY      Total    HX MASTECTOMY Right 10/19/2018    RIGHT BREAST NEEDLE LOCALIZED LUMPECTOMY AND SENTINEL LYMPH NODE (LOC 0830; NUC 10.18.18 @ 0900) performed by Raheel Qureshi MD at [address] [place] [address] HX OOPHORECTOMY      HX OTHER SURGICAL      neck surgery     IR INSERT TUNL CVC W PORT OVER 5 YEARS  1/16/2019    AL BX/REMV,LYMPH NODE,DEEP AXILL Right 10/19/2018    Dr. Tia Wray [facility] CTR VAD W/SUBQ PORT AGE 5 YR/> N/A 03/08/2018    Dr. Verónica Monae      Family History   Problem Relation Age of Onset    Stroke Mother     Heart Disease Father       Social History     Tobacco Use    Smoking status: Never Smoker    Smokeless tobacco: Current User   Substance Use Topics    Alcohol use: No     Alcohol/week: 2.0 standard drinks     Types: 2 Cans of beer per week     Comment: weekly       Allergies   Allergen Reactions    Shellfish Derived Swelling       Prior to Admission medications    Medication Sig Start Date End Date Taking? Authorizing Provider   famotidine (PEPCID) 40 mg tablet Take 40 mg by mouth daily. Yes Provider, Historical   sucralfate (CARAFATE) 1 gram tablet Take 1 g by mouth. Patient not taking: Reported on 5/13/2022    Jose, MD [place]   ibuprofen (MOTRIN) 800 mg tablet Take 1 Tab by mouth three (3) times daily as needed for Pain. Patient not taking: Reported on 5/13/2022 1/8/20   Hitesh Rosales MD   STOOL SOFTENER 100 mg capsule Take 100 mg by mouth daily. Patient not taking: Reported on 5/13/2022 10/19/18   Provider, Historical       Review of Systems:    14 systems were reviewed. The results are as above in the HPI and otherwise negative. Objective:     Vitals:    05/13/22 1450 05/13/22 1453   BP: (!) 158/98 (!) 160/92   Pulse: 80    Resp: 16    Temp: 97.9 °F (36.6 °C)    TempSrc: Temporal    SpO2: 98%    Weight: 54.9 kg (121 lb)    Height: 5' 5\" (1.651 m)        Physical Exam:  GENERAL: alert, cooperative, no distress, appears stated age,   EYE: conjunctivae/corneas clear. PERRL, EOM's intact. THROAT & NECK: normal and no erythema or exudates noted. ,    LYMPHATIC: Cervical, supraclavicular, and axillary nodes normal. ,   LUNG: clear to auscultation bilaterally,   HEART: regular rate and rhythm, S1, S2 normal, no murmur, click, rub or gallop,   ABDOMEN: soft, non-tender. Bowel sounds normal. No masses,  no organomegaly,   EXTREMITIES:  extremities normal, atraumatic, no cyanosis or edema,   SKIN: Normal.,   NEUROLOGIC: AOx3. Cranial nerves 2-12 and sensation grossly intact. ,     Data Review:  to be done    Ms. Krish Dee has a reminder for a \"due or due soon\" health maintenance. I have asked that she contact her primary care provider for follow-up on this health maintenance. Impression:     · Pt 2 yrs out from lumpectomy for stage I ER/MO - Her 2+ with unwanted mediport.     Plan:     · Removal right chest wall Mediport  · Consent on chart  · Preoperative orders written    Signed By: Jo Mcdonald MD     May 13, 2022

## 2022-05-31 NOTE — PERIOP NOTES
PRE-SURGICAL INSTRUCTIONS        Patient's Name:  Mayi ALVARADO Date:  5/31/2022            Covid Testing Date and Time:  Will bring vaccination record    Surgery Date:  6/3/2022                1. Do NOT eat or drink anything, including candy, gum, or ice chips after midnight on 6/3, unless you have specific instructions from your surgeon or anesthesia provider to do so.  2. You may brush your teeth before coming to the hospital.  3. No smoking 24 hours prior to the day of surgery. 4. No alcohol 24 hours prior to the day of surgery. 5. No recreational drugs for one week prior to the day of surgery. 6. Leave all valuables, including money/purse, at home. 7. Remove all jewelry, nail polish, acrylic nails, and makeup (including mascara); no lotions powders, deodorant, or perfume/cologne/after shave on the skin. 8. Follow instruction for Hibiclens washes and CHG wipes from surgeon's office. 9. Glasses/contact lenses and dentures may be worn to the hospital.  They will be removed prior to surgery. 10. Call your doctor if symptoms of a cold or illness develop within 24-48 hours prior to your surgery. 11.  If you are having an outpatient procedure, please make arrangements for a responsible ADULT TO [address] [place] [address] and stay with you for 24 hours after your surgery. 12. ONE VISITOR in the hospital at this time for outpatient procedures. Exceptions may be made for surgical admissions, per nursing unit guidelines      Special Instructions:      Bring list of CURRENT medications. Bring any pertinent legal medical records. Take these medications the morning of surgery with a sip of water:  As directed by MD  Follow physician instructions about stopping anticoagulants. On the day of surgery, come in the main entrance of [facility]. Let the  at the desk know you are there for surgery.   A staff member will come escort you to the surgical area on the second floor. If you have any questions or concerns, please do not hesitate to call:     (Prior to the day of surgery) PAT department:  [phone]   (Day of surgery) Pre-Op department:  [phone]    These surgical instructions were reviewed with Nick Fonseca and philippe Menendez during the [facility] phone call.

## 2022-06-16 ENCOUNTER — ANESTHESIA EVENT (OUTPATIENT)
Dept: SURGERY | Age: 62
End: 2022-06-16
Payer: MEDICAID

## 2022-06-17 ENCOUNTER — ANESTHESIA (OUTPATIENT)
Dept: SURGERY | Age: 62
End: 2022-06-17
Payer: MEDICAID

## 2022-06-17 ENCOUNTER — HOSPITAL ENCOUNTER (OUTPATIENT)
Age: 62
Setting detail: OUTPATIENT SURGERY
Discharge: HOME | End: 2022-06-17
Attending: SURGERY | Admitting: SURGERY
Payer: MEDICAID

## 2022-06-17 VITALS
RESPIRATION RATE: 11 BRPM | HEIGHT: 65 IN | WEIGHT: 121.1 LBS | OXYGEN SATURATION: 100 % | TEMPERATURE: 97.3 F | BODY MASS INDEX: 20.18 KG/M2 | SYSTOLIC BLOOD PRESSURE: 136 MMHG | HEART RATE: 66 BPM | DIASTOLIC BLOOD PRESSURE: 80 MMHG

## 2022-06-17 DIAGNOSIS — G89.18 POSTOPERATIVE PAIN: Primary | ICD-10-CM

## 2022-06-17 PROCEDURE — 77030040922 HC BLNKT HYPOTHRM STRY -A: Performed by: SURGERY

## 2022-06-17 PROCEDURE — 76060000032 HC ANESTHESIA 0.5 TO 1 HR: Performed by: SURGERY

## 2022-06-17 PROCEDURE — 76210000021 HC REC RM PH II 0.5 TO 1 HR: Performed by: SURGERY

## 2022-06-17 PROCEDURE — 74011000250 HC RX REV CODE- 250: Performed by: SURGERY

## 2022-06-17 PROCEDURE — 76010000138 HC OR TIME 0.5 TO 1 HR: Performed by: SURGERY

## 2022-06-17 PROCEDURE — 74011250637 HC RX REV CODE- 250/637: Performed by: NURSE ANESTHETIST, CERTIFIED REGISTERED

## 2022-06-17 PROCEDURE — 88300 SURGICAL PATH GROSS: CPT

## 2022-06-17 PROCEDURE — 77030003028 HC SUT VCRL J&J -A: Performed by: SURGERY

## 2022-06-17 PROCEDURE — 77030040361 HC SLV COMPR DVT MDII -B: Performed by: SURGERY

## 2022-06-17 PROCEDURE — 76210000006 HC OR PH I REC 0.5 TO 1 HR: Performed by: SURGERY

## 2022-06-17 PROCEDURE — 77030018836 HC SOL IRR NACL ICUM -A: Performed by: SURGERY

## 2022-06-17 PROCEDURE — 2709999900 HC NON-CHARGEABLE SUPPLY: Performed by: SURGERY

## 2022-06-17 PROCEDURE — 74011000250 HC RX REV CODE- 250: Performed by: NURSE ANESTHETIST, CERTIFIED REGISTERED

## 2022-06-17 PROCEDURE — 00400 ANES INTEGUMENTARY SYS NOS: CPT | Performed by: ANESTHESIOLOGY

## 2022-06-17 PROCEDURE — 74011250636 HC RX REV CODE- 250/636: Performed by: SURGERY

## 2022-06-17 PROCEDURE — 77030002996 HC SUT SLK J&J -A: Performed by: SURGERY

## 2022-06-17 PROCEDURE — 74011250636 HC RX REV CODE- 250/636: Performed by: NURSE ANESTHETIST, CERTIFIED REGISTERED

## 2022-06-17 PROCEDURE — 36590 REMOVAL TUNNELED CV CATH: CPT | Performed by: SURGERY

## 2022-06-17 PROCEDURE — 77030002933 HC SUT MCRYL J&J -A: Performed by: SURGERY

## 2022-06-17 PROCEDURE — 77030031139 HC SUT VCRL2 J&J -A: Performed by: SURGERY

## 2022-06-17 PROCEDURE — 00400 ANES INTEGUMENTARY SYS NOS: CPT | Performed by: NURSE ANESTHETIST, CERTIFIED REGISTERED

## 2022-06-17 RX ORDER — OXYCODONE HYDROCHLORIDE 5 MG/1
5 TABLET ORAL
Qty: 20 TABLET | Refills: 0 | Status: SHIPPED | OUTPATIENT
Start: 2022-06-17 | End: 2022-06-20

## 2022-06-17 RX ORDER — DOCUSATE SODIUM 100 MG/1
100 CAPSULE, LIQUID FILLED ORAL 2 TIMES DAILY
Qty: 60 CAPSULE | Refills: 2 | Status: SHIPPED | OUTPATIENT
Start: 2022-06-17 | End: 2022-09-15

## 2022-06-17 RX ORDER — DIPHENHYDRAMINE HYDROCHLORIDE 50 MG/ML
12.5 INJECTION, SOLUTION INTRAMUSCULAR; INTRAVENOUS
Status: CANCELLED | OUTPATIENT
Start: 2022-06-17

## 2022-06-17 RX ORDER — DEXAMETHASONE SODIUM PHOSPHATE 4 MG/ML
INJECTION, SOLUTION INTRA-ARTICULAR; INTRALESIONAL; INTRAMUSCULAR; INTRAVENOUS; SOFT TISSUE AS NEEDED
Status: DISCONTINUED | OUTPATIENT
Start: 2022-06-17 | End: 2022-06-17 | Stop reason: HOSPADM

## 2022-06-17 RX ORDER — FAMOTIDINE 20 MG/1
20 TABLET, FILM COATED ORAL ONCE
Status: COMPLETED | OUTPATIENT
Start: 2022-06-17 | End: 2022-06-17

## 2022-06-17 RX ORDER — KETOROLAC TROMETHAMINE 15 MG/ML
INJECTION, SOLUTION INTRAMUSCULAR; INTRAVENOUS AS NEEDED
Status: DISCONTINUED | OUTPATIENT
Start: 2022-06-17 | End: 2022-06-17 | Stop reason: HOSPADM

## 2022-06-17 RX ORDER — MIDAZOLAM HYDROCHLORIDE 1 MG/ML
INJECTION, SOLUTION INTRAMUSCULAR; INTRAVENOUS AS NEEDED
Status: DISCONTINUED | OUTPATIENT
Start: 2022-06-17 | End: 2022-06-17 | Stop reason: HOSPADM

## 2022-06-17 RX ORDER — ONDANSETRON 2 MG/ML
4 INJECTION INTRAMUSCULAR; INTRAVENOUS ONCE
Status: CANCELLED | OUTPATIENT
Start: 2022-06-17 | End: 2022-06-17

## 2022-06-17 RX ORDER — LIDOCAINE HYDROCHLORIDE 10 MG/ML
0.1 INJECTION, SOLUTION EPIDURAL; INFILTRATION; INTRACAUDAL; PERINEURAL AS NEEDED
Status: DISCONTINUED | OUTPATIENT
Start: 2022-06-17 | End: 2022-06-17 | Stop reason: HOSPADM

## 2022-06-17 RX ORDER — FENTANYL CITRATE 50 UG/ML
25 INJECTION, SOLUTION INTRAMUSCULAR; INTRAVENOUS AS NEEDED
Status: CANCELLED | OUTPATIENT
Start: 2022-06-17

## 2022-06-17 RX ORDER — LIDOCAINE HYDROCHLORIDE AND EPINEPHRINE 20; 10 MG/ML; UG/ML
INJECTION, SOLUTION INFILTRATION; PERINEURAL AS NEEDED
Status: DISCONTINUED | OUTPATIENT
Start: 2022-06-17 | End: 2022-06-17 | Stop reason: HOSPADM

## 2022-06-17 RX ORDER — NALBUPHINE HYDROCHLORIDE 10 MG/ML
5 INJECTION, SOLUTION INTRAMUSCULAR; INTRAVENOUS; SUBCUTANEOUS
Status: CANCELLED | OUTPATIENT
Start: 2022-06-17

## 2022-06-17 RX ORDER — PHENYLEPHRINE HCL IN 0.9% NACL 1 MG/10 ML
SYRINGE (ML) INTRAVENOUS AS NEEDED
Status: DISCONTINUED | OUTPATIENT
Start: 2022-06-17 | End: 2022-06-17 | Stop reason: HOSPADM

## 2022-06-17 RX ORDER — ACETAMINOPHEN 325 MG/1
650 TABLET ORAL EVERY 6 HOURS
Qty: 56 TABLET | Refills: 1 | Status: SHIPPED | OUTPATIENT
Start: 2022-06-17

## 2022-06-17 RX ORDER — SODIUM CHLORIDE, SODIUM LACTATE, POTASSIUM CHLORIDE, CALCIUM CHLORIDE 600; 310; 30; 20 MG/100ML; MG/100ML; MG/100ML; MG/100ML
75 INJECTION, SOLUTION INTRAVENOUS CONTINUOUS
Status: CANCELLED | OUTPATIENT
Start: 2022-06-17

## 2022-06-17 RX ORDER — SODIUM CHLORIDE 0.9 % (FLUSH) 0.9 %
5-40 SYRINGE (ML) INJECTION AS NEEDED
Status: CANCELLED | OUTPATIENT
Start: 2022-06-17

## 2022-06-17 RX ORDER — BUPIVACAINE HYDROCHLORIDE 5 MG/ML
INJECTION, SOLUTION EPIDURAL; INTRACAUDAL AS NEEDED
Status: DISCONTINUED | OUTPATIENT
Start: 2022-06-17 | End: 2022-06-17 | Stop reason: HOSPADM

## 2022-06-17 RX ORDER — ONDANSETRON 2 MG/ML
INJECTION INTRAMUSCULAR; INTRAVENOUS AS NEEDED
Status: DISCONTINUED | OUTPATIENT
Start: 2022-06-17 | End: 2022-06-17 | Stop reason: HOSPADM

## 2022-06-17 RX ORDER — SODIUM CHLORIDE, SODIUM LACTATE, POTASSIUM CHLORIDE, CALCIUM CHLORIDE 600; 310; 30; 20 MG/100ML; MG/100ML; MG/100ML; MG/100ML
50 INJECTION, SOLUTION INTRAVENOUS CONTINUOUS
Status: DISCONTINUED | OUTPATIENT
Start: 2022-06-17 | End: 2022-06-17 | Stop reason: HOSPADM

## 2022-06-17 RX ORDER — PROPOFOL 10 MG/ML
INJECTION, EMULSION INTRAVENOUS
Status: DISCONTINUED | OUTPATIENT
Start: 2022-06-17 | End: 2022-06-17 | Stop reason: HOSPADM

## 2022-06-17 RX ORDER — LIDOCAINE HYDROCHLORIDE 20 MG/ML
INJECTION, SOLUTION EPIDURAL; INFILTRATION; INTRACAUDAL; PERINEURAL AS NEEDED
Status: DISCONTINUED | OUTPATIENT
Start: 2022-06-17 | End: 2022-06-17 | Stop reason: HOSPADM

## 2022-06-17 RX ORDER — SODIUM CHLORIDE 0.9 % (FLUSH) 0.9 %
5-40 SYRINGE (ML) INJECTION EVERY 8 HOURS
Status: CANCELLED | OUTPATIENT
Start: 2022-06-17

## 2022-06-17 RX ADMIN — FAMOTIDINE 20 MG: 20 TABLET, FILM COATED ORAL at 13:00

## 2022-06-17 RX ADMIN — SODIUM CHLORIDE, SODIUM LACTATE, POTASSIUM CHLORIDE, AND CALCIUM CHLORIDE 50 ML/HR: 600; 310; 30; 20 INJECTION, SOLUTION INTRAVENOUS at 13:15

## 2022-06-17 RX ADMIN — LIDOCAINE HYDROCHLORIDE 20 MG: 20 INJECTION, SOLUTION EPIDURAL; INFILTRATION; INTRACAUDAL; PERINEURAL at 15:35

## 2022-06-17 RX ADMIN — CEFAZOLIN SODIUM 2 G: 1 INJECTION, POWDER, FOR SOLUTION INTRAMUSCULAR; INTRAVENOUS at 15:35

## 2022-06-17 RX ADMIN — DEXAMETHASONE SODIUM PHOSPHATE 4 MG: 4 INJECTION, SOLUTION INTRAMUSCULAR; INTRAVENOUS at 15:34

## 2022-06-17 RX ADMIN — KETOROLAC TROMETHAMINE 15 MG: 15 INJECTION, SOLUTION INTRAMUSCULAR; INTRAVENOUS at 15:30

## 2022-06-17 RX ADMIN — Medication 100 MCG: at 16:08

## 2022-06-17 RX ADMIN — MIDAZOLAM HYDROCHLORIDE 2 MG: 2 INJECTION, SOLUTION INTRAMUSCULAR; INTRAVENOUS at 15:30

## 2022-06-17 RX ADMIN — PROPOFOL 50 MCG/KG/MIN: 10 INJECTION, EMULSION INTRAVENOUS at 15:35

## 2022-06-17 RX ADMIN — ONDANSETRON 4 MG: 2 INJECTION INTRAMUSCULAR; INTRAVENOUS at 15:35

## 2022-06-17 NOTE — ANESTHESIA POSTPROCEDURE EVALUATION
Procedure(s):  REMOVAL RIGHT CHEST WALL MEDIPORT. general    Anesthesia Post Evaluation      Multimodal analgesia: multimodal analgesia used between 6 hours prior to anesthesia start to PACU discharge  Patient location during evaluation: bedside  Patient participation: complete - patient participated  Level of consciousness: awake  Pain score: 2  Pain management: adequate  Airway patency: patent  Anesthetic complications: no  Cardiovascular status: stable  Respiratory status: acceptable  Hydration status: acceptable  Post anesthesia nausea and vomiting:  controlled  Final Post Anesthesia Temperature Assessment:  Normothermia (36.0-37.5 degrees C)      INITIAL Post-op Vital signs:   Vitals Value Taken Time   /77 06/17/22 1655   Temp 36.7 °C (98 °F) 06/17/22 1619   Pulse 63 06/17/22 1702   Resp 10 06/17/22 1702   SpO2 100 % 06/17/22 1702   Vitals shown include unvalidated device data.

## 2022-06-17 NOTE — INTERVAL H&P NOTE
Update History & Physical    The Patient's History and Physical of June 17, 2022 was reviewed with the patient and I examined the patient. There was no change. The surgical site was confirmed by the patient and me. Plan:  The risk, benefits, expected outcome, and alternative to the recommended procedure have been discussed with the patient. Patient understands and wants to proceed with the procedure.     Electronically signed by Ariana Lemus MD on 6/17/2022 at 3:00 PM

## 2022-06-17 NOTE — ANESTHESIA PREPROCEDURE EVALUATION
Relevant Problems   No relevant active problems       Anesthetic History   No history of anesthetic complications            Review of Systems / Medical History  Patient summary reviewed and pertinent labs reviewed    Pulmonary  Within defined limits                 Neuro/Psych   Within defined limits           Cardiovascular                  Exercise tolerance: >4 METS     GI/Hepatic/Renal     GERD      Liver disease     Endo/Other        Cancer     Other Findings              Physical Exam    Airway  Mallampati: III  TM Distance: 4 - 6 cm  Neck ROM: decreased range of motion   Mouth opening: Normal     Cardiovascular    Rhythm: regular  Rate: normal         Dental    Dentition: Poor dentition     Pulmonary  Breath sounds clear to auscultation               Abdominal  GI exam deferred       Other Findings            Anesthetic Plan    ASA: 3  Anesthesia type: general          Induction: Intravenous  Anesthetic plan and risks discussed with: Patient

## 2022-06-17 NOTE — DISCHARGE INSTRUCTIONS
DISCHARGE SUMMARY from Nurse    PATIENT INSTRUCTIONS:    After general anesthesia or intravenous sedation, for 24 hours or while taking prescription Narcotics:  · Limit your activities  · Do not drive and operate hazardous machinery  · Do not make important personal or business decisions  · Do  not drink alcoholic beverages  · If you have not urinated within 8 hours after discharge, please contact your surgeon on call. Report the following to your surgeon:  · Excessive pain, swelling, redness or odor of or around the surgical area  · Temperature over 100.5  · Nausea and vomiting lasting longer than 4 hours or if unable to take medications  · Any signs of decreased circulation or nerve impairment to extremity: change in color, persistent  numbness, tingling, coldness or increase pain  · Any questions    What to do at Home:    *  Please give a list of your current medications to your Primary Care Provider. *  Please update this list whenever your medications are discontinued, doses are      changed, or new medications (including over-the-counter products) are added. *  Please carry medication information at all times in case of emergency situations. These are general instructions for a healthy lifestyle:    No smoking/ No tobacco products/ Avoid exposure to second hand smoke  Surgeon General's Warning:  Quitting smoking now greatly reduces serious risk to your health. Obesity, smoking, and sedentary lifestyle greatly increases your risk for illness    A healthy diet, regular physical exercise & weight monitoring are important for maintaining a healthy lifestyle    You may be retaining fluid if you have a history of heart failure or if you experience any of the following symptoms:  Weight gain of 3 pounds or more overnight or 5 pounds in a week, increased swelling in our hands or feet or shortness of breath while lying flat in bed.   Please call your doctor as soon as you notice any of these symptoms; do not wait until your next office visit. The discharge information has been reviewed with the patient. The patient verbalized understanding. Discharge medications reviewed with the patient and appropriate educational materials and side effects teaching were provided. ___________________________________________________________________________________________________________________________________[facility]  Hans Manzanares MD, [facility]  General Surgery    Pt may remove the dressing and shower in two days. Please use your wash clothe to wash the incision with soap and water once the dressings have been removed  No driving or operating heavy machinery while on narcotic pain medications. Please apply an ice pack to the operative site for 30 minutes 3 times daily to help reduce pain and swelling and the need for narcotic pain medication. No strenuous activity or contact sports for two weeks. No lifting greater than 15 lbs for 2 weeks. Call MD for any redness, swelling, bleeding or pus at the incision. Also call for any nausea, vomiting, increased pain or pain uncontrolled by pain medicine.

## 2022-06-17 NOTE — H&P
[facility]  General Surgery       Impression:      · Pt 2 yrs out from lumpectomy for stage I ER/NE - Her 2+ with unwanted mediport.     Plan:      · Removal right chest wall Mediport  · Consent on chart  · Preoperative orders written         Subjective:      CC: Port-A-Cath in place     HPI: Patient is a very pleasant 25-year-old female with past medical history remarkable for right breast cancer status post mastectomy October 2018 for ER/NE negative HER2 positive stage I right breast cancer. Most recent mammogram performed 4/18/2022 is BI-RADS 2. Patient no longer needs her Mediport.   She would like to have removed.          Patient Active Problem List     Diagnosis Date Noted    Hypokalemia 04/19/2018    Antineoplastic chemotherapy induced anemia 04/18/2018    Fatigue due to treatment 04/18/2018    Breast cancer, right ([address] 03/08/2018    Malignant neoplasm of upper-outer quadrant of right breast in female, estrogen receptor negative ([address] 02/14/2018    Viral hepatitis B with hepatic coma 02/14/2018    GERD (gastroesophageal reflux disease) 09/05/2014           Past Medical History:   Diagnosis Date    Breast cancer ([address] 2017     Right    Cancer ([address]       right breast    Hepatitis B      Menopause      Radiation therapy complication      S/P chemotherapy, time since 4-12 weeks      Tuberculosis 1980    Wrist fracture, right 10/13/2018            Past Surgical History:   Procedure Laterality Date    HX BREAST BIOPSY        HX BREAST LUMPECTOMY   2017    HX GYN         hysterectomy     HX HYSTERECTOMY         Total    HX MASTECTOMY Right 10/19/2018     RIGHT BREAST NEEDLE LOCALIZED LUMPECTOMY AND SENTINEL LYMPH NODE (LOC 0830; NUC 10.18.18 @ 0900) performed by Hitesh Rosales MD at [facility] MAIN OR    HX OOPHORECTOMY        HX OTHER SURGICAL         neck surgery     IR INSERT TUNL CVC W PORT OVER 5 YEARS   1/16/2019    NE BX/REMV,LYMPH NODE,DEEP AXILL Right 10/19/2018     Dr. Trudi Berry [facility] CTR VAD W/SUBQ PORT AGE 5 YR/> N/A 03/08/2018     Dr. Dexter Savage            Family History   Problem Relation Age of Onset    Stroke Mother      Heart Disease Father        Social History            Tobacco Use    Smoking status: Never Smoker    Smokeless tobacco: Current User   Substance Use Topics    Alcohol use: No       Alcohol/week: 2.0 standard drinks       Types: 2 Cans of beer per week       Comment: weekly            Allergies   Allergen Reactions    Shellfish Derived Swelling               Prior to Admission medications    Medication Sig Start Date End Date Taking? Authorizing Provider   famotidine (PEPCID) 40 mg tablet Take 40 mg by mouth daily.     Yes Provider, Historical   sucralfate (CARAFATE) 1 gram tablet Take 1 g by mouth. Patient not taking: Reported on 5/13/2022       Jose, MD [place]   ibuprofen (MOTRIN) 800 mg tablet Take 1 Tab by mouth three (3) times daily as needed for Pain. Patient not taking: Reported on 5/13/2022 1/8/20     Henrietta Granados MD   STOOL SOFTENER 100 mg capsule Take 100 mg by mouth daily. Patient not taking: Reported on 5/13/2022 10/19/18     Provider, Historical         Review of Systems:    14 systems were reviewed. The results are as above in the HPI and otherwise negative.      Objective:           Vitals:     05/13/22 1450 05/13/22 1453   BP: (!) 158/98 (!) 160/92   Pulse: 80     Resp: 16     Temp: 97.9 °F (36.6 °C)     TempSrc: Temporal     SpO2: 98%     Weight: 54.9 kg (121 lb)     Height: 5' 5\" (1.651 m)           Physical Exam:  GENERAL: alert, cooperative, no distress, appears stated age,   EYE: conjunctivae/corneas clear. PERRL, EOM's intact.   THROAT & NECK: normal and no erythema or exudates noted. ,    LYMPHATIC: Cervical, supraclavicular, and axillary nodes normal. ,   LUNG: clear to auscultation bilaterally,   HEART: regular rate and rhythm, S1, S2 normal, no murmur, click, rub or gallop,   ABDOMEN: soft, non-tender. Bowel sounds normal. No masses,  no organomegaly,   EXTREMITIES:  extremities normal, atraumatic, no cyanosis or edema,   SKIN: Normal.,   NEUROLOGIC: AOx3. Cranial nerves 2-12 and sensation grossly intact. ,      Data Review:  to be done     Ms. Krish Dee has a reminder for a \"due or due soon\" health maintenance. I have asked that she contact her primary care provider for follow-up on this health maintenance.

## 2022-06-17 NOTE — OP NOTES
[address]     OPERATIVE REPORT     PATIENT: Cassius Guajardo  MRN: [id number] DATE: 2022  BILLIN  ROOM: /    ATTENDING: Stewart Roche MD   DICTATING: Stewart Roche MD     PREOPERATIVE DIAGNOSIS: right chest wall mediPort  POSTOPERATIVE DIAGNOSIS: Same   PROCEDURE PERFORMED: Removal of right chest wall MediPort  SURGEON: Charles P. Delos Goldberg, MD   ASSISTANT: Sarahy Costa SA   ANESTHESIA: Monitored anesthesia care and local (0.5% Marcaine plain in 1:1 solution with 2% lidocaine with epinephrine). FINDINGS: Intact MediPort. SPECIMEN: MediPort. ESTIMATED BLOOD LOSS: 20 mL   FLUIDS GIVEN: 388 mL   COMPLICATIONS: None. INDICATION:  MediPort no longer needed. DESCRIPTION OF PROCEDURE: The patient was identified in the holding area,   where consent for MediPort removal was verified. The right chest wall was marked   with the surgeon's initials to ensure correct site surgery. The patient did   receive perioperative antibiotics. In the operating room, the patient was   placed under monitored anesthesia care. Both arms were tucked. The chest   wall was prepped and draped in sterile fashion using chlorhexidine solution   and sterile drapes. The time-out was performed to ensure correct procedure. Local anesthetic was infiltrated into the skin and deep dermal tissues at   the prior incision and surrounding the port. The 15 blade was used to make   an incision in the old incision. The retractors were inserted and   dissection proceeded to remove the port and the catheter. The fibrous   capsule surrounding the port was then also removed. Hemostasis was obtained   using electrocautery. A 3-0 Vicryl suture and 4-0 Monocryl suture. Sterile dressings were applied. The patient tolerated the procedure very well. DISPOSITION: He was stable upon transport to the recovery room.

## 2022-06-17 NOTE — DISCHARGE SUMMARY
[address] [place] [address] [facility]  Darryn Munoz MD, FACS  General Surgery  Discharge Summary     Patient ID:  Alexi Almaguer  [id number]  1 y.o.  1960    Admit Date: 6/17/2022    Discharge Date: 6/17/2022    Admission Diagnoses: Z95.828 PORT-A-CATH IN PLACE    Discharge Diagnoses:    Problem List as of 6/17/2022 Date Reviewed: 5/13/2022          Codes Class Noted - Resolved    Hypokalemia ICD-10-CM: E87.6  ICD-9-CM: 276.8  4/19/2018 - Present        Antineoplastic chemotherapy induced anemia ICD-10-CM: D64.81, T45.1X5A  ICD-9-CM: 285.3, E933.1  4/18/2018 - Present        Fatigue due to treatment ICD-10-CM: R53.83  ICD-9-CM: 780.79  4/18/2018 - Present        Breast cancer, [facility]) ICD-10-CM: C50.911  ICD-9-CM: 174.9  3/8/2018 - Present        Malignant neoplasm of upper-outer quadrant of right breast in female, estrogen receptor negative ([facility] ICD-10-CM: C50.411, Z17.1  ICD-9-CM: 174.4, V86.1  2/14/2018 - Present        Viral hepatitis B with hepatic coma ICD-10-CM: B19.11  ICD-9-CM: 070.20  2/14/2018 - Present        GERD (gastroesophageal reflux disease) ICD-10-CM: K21.9  ICD-9-CM: 530.81  9/5/2014 - Present               Admission Condition: Good    Discharge Condition: Good    Last Procedure: Procedure(s):  REMOVAL RIGHT CHEST [address] Course:   Normal hospital course for this procedure. Consults: None    Significant Diagnostic Studies: None    Disposition: home    Patient Instructions:   Current Discharge Medication List      START taking these medications    Details   oxyCODONE IR (ROXICODONE) 5 mg immediate release tablet Take 1 Tablet by mouth every four (4) hours as needed for Pain for up to 3 days. Max Daily Amount: 30 mg.  Qty: 20 Tablet, Refills: 0    Associated Diagnoses: Postoperative pain      acetaminophen (TYLENOL) 325 mg tablet Take 2 Tablets by mouth every six (6) hours.  Indications: pain  Qty: 56 Tablet, Refills: 1      bisacodyL 5 mg tab Take 5 mg by mouth daily as needed for PRN Reason (Other) (Constipation). Qty: 3 Tablet, Refills: 0      docusate sodium (COLACE) 100 mg capsule Take 1 Capsule by mouth two (2) times a day for 90 days. Qty: 60 Capsule, Refills: 2           Activity: See surgical instructions  Diet: Low fat, Low cholesterol  Wound Care: As directed    Follow-up with Dr. Austin Decker in 2 weeks.   Follow-up tests/labs None    Signed:  Ariana Lemus MD  6/17/2022  3:10 PM

## 2022-07-22 ENCOUNTER — OFFICE VISIT (OUTPATIENT)
Dept: SURGERY | Age: 62
End: 2022-07-22
Payer: MEDICAID

## 2022-07-22 VITALS
RESPIRATION RATE: 18 BRPM | SYSTOLIC BLOOD PRESSURE: 138 MMHG | TEMPERATURE: 97.7 F | HEIGHT: 65 IN | DIASTOLIC BLOOD PRESSURE: 76 MMHG | WEIGHT: 120 LBS | OXYGEN SATURATION: 100 % | BODY MASS INDEX: 19.99 KG/M2 | HEART RATE: 68 BPM

## 2022-07-22 DIAGNOSIS — Z12.31 BREAST CANCER SCREENING BY MAMMOGRAM: Primary | ICD-10-CM

## 2022-07-22 PROCEDURE — 99024 POSTOP FOLLOW-UP VISIT: CPT | Performed by: SURGERY

## 2022-07-22 NOTE — PROGRESS NOTES
Sena Rosas is a 64 y.o. female  Chief Complaint   Patient presents with    Post OP Follow Up     6/17/22  right mediport removal     Visit Vitals  /76   Pulse 68   Temp 97.7 °F (36.5 °C)   Resp 18   Ht 5' 5\" (1.651 m)   Wt 120 lb (54.4 kg)   SpO2 100%   BMI 19.97 kg/m²

## 2022-07-22 NOTE — PROGRESS NOTES
[employer] [facility]  General Surgery    Name: Amalia Johnson MRN: [id number]   : 1960 Hospital: [facility]   Date: 2022 Admission Date: No admission date for patient encounter. Subjective:  Patient returns to the several weeks out from removal of right chest wall Mediport. She has no complaints. Her last mammogram was in April which was normal.  Objective:  Vitals:    22 1250   BP: 138/76   Pulse: 68   Resp: 18   Temp: 97.7 °F (36.5 °C)   SpO2: 100%   Weight: 54.4 kg (120 lb)   Height: 5' 5\" (1.651 m)       Physical Exam:    General: Awake and alert, oriented x4, no apparent distress   Right chest wall incision is healing well. Current Medications:  Current Outpatient Medications   Medication Sig Dispense Refill    acetaminophen (TYLENOL) 325 mg tablet Take 2 Tablets by mouth every six (6) hours. Indications: pain 56 Tablet 1    bisacodyL 5 mg tab Take 5 mg by mouth daily as needed for PRN Reason (Other) (Constipation). 3 Tablet 0    docusate sodium (COLACE) 100 mg capsule Take 1 Capsule by mouth two (2) times a day for 90 days. 60 Capsule 2       Chart and notes reviewed. Data reviewed. I have evaluated and examined the patient. IMPRESSION:   Patient with history of stage I invasive ductal carcinoma ER/CA negative HER2 positive left breast status post neoadjuvant chemotherapy      PLAN:/DISCUSION:   Patient stated that she felt like she had lack of energy. We looked at her diet she is eating lots of potato chips and junk food and drinking lots of soda and not eating real food. I encouraged her to eat whole foods and drink water. Follow-up in October or November for clinical breast exam  Bilateral 3D screening mammography 2023  Please call or visit with any questions or concerns.           Ernst Lozano MD

## 2023-02-27 ENCOUNTER — HOSPITAL ENCOUNTER (EMERGENCY)
Age: 63
Discharge: HOME | End: 2023-02-27
Attending: INTERNAL MEDICINE
Payer: MEDICAID

## 2023-02-27 VITALS
SYSTOLIC BLOOD PRESSURE: 130 MMHG | TEMPERATURE: 98.1 F | OXYGEN SATURATION: 99 % | DIASTOLIC BLOOD PRESSURE: 77 MMHG | RESPIRATION RATE: 16 BRPM | HEART RATE: 77 BPM

## 2023-02-27 DIAGNOSIS — N30.00 ACUTE CYSTITIS WITHOUT HEMATURIA: Primary | ICD-10-CM

## 2023-02-27 LAB
APPEARANCE UR: CLEAR
BACTERIA URNS QL MICRO: ABNORMAL /HPF
BILIRUB UR QL: NEGATIVE
COLOR UR: YELLOW
EPITH CASTS URNS QL MICRO: ABNORMAL /LPF (ref 0–20)
GLUCOSE UR STRIP.AUTO-MCNC: NEGATIVE MG/DL
HGB UR QL STRIP: NEGATIVE
KETONES UR QL STRIP.AUTO: NEGATIVE MG/DL
LEUKOCYTE ESTERASE UR QL STRIP.AUTO: ABNORMAL
MUCOUS THREADS URNS QL MICRO: ABNORMAL /LPF
NITRITE UR QL STRIP.AUTO: POSITIVE
PH UR STRIP: 5.5 (ref 5–8)
PROT UR STRIP-MCNC: NEGATIVE MG/DL
RBC #/AREA URNS HPF: ABNORMAL /HPF (ref 0–2)
SP GR UR REFRACTOMETRY: 1.02 (ref 1–1.03)
UROBILINOGEN UR QL STRIP.AUTO: 1 EU/DL (ref 0.2–1)
WBC URNS QL MICRO: ABNORMAL /HPF (ref 0–4)

## 2023-02-27 PROCEDURE — 87186 SC STD MICRODIL/AGAR DIL: CPT

## 2023-02-27 PROCEDURE — 87077 CULTURE AEROBIC IDENTIFY: CPT

## 2023-02-27 PROCEDURE — 87086 URINE CULTURE/COLONY COUNT: CPT

## 2023-02-27 PROCEDURE — 81001 URINALYSIS AUTO W/SCOPE: CPT

## 2023-02-27 PROCEDURE — 99283 EMERGENCY DEPT VISIT LOW MDM: CPT

## 2023-02-27 RX ORDER — FERROUS SULFATE 325(65) MG
325 TABLET ORAL
COMMUNITY

## 2023-02-27 RX ORDER — CEPHALEXIN 500 MG/1
500 CAPSULE ORAL 3 TIMES DAILY
Qty: 21 CAPSULE | Refills: 0 | Status: SHIPPED | OUTPATIENT
Start: 2023-02-27 | End: 2023-03-06

## 2023-02-27 ASSESSMENT — ENCOUNTER SYMPTOMS
RHINORRHEA: 0
DIARRHEA: 0
ABDOMINAL PAIN: 0
WHEEZING: 0
SHORTNESS OF BREATH: 0
SORE THROAT: 0
VOMITING: 0
NAUSEA: 0
EYE REDNESS: 0
COUGH: 0

## 2023-02-27 NOTE — ED PROVIDER NOTES
[facility] EMERGENCY DEPT  EMERGENCY DEPARTMENT ENCOUNTER      Pt Name: Anjali Combs  MRN: [id number]  [place] 1960  Date of evaluation: 2/27/2023  Provider: Doug Mckinney MD    CHIEF COMPLAINT       Chief Complaint   Patient presents with    Urinary Frequency         HISTORY OF PRESENT ILLNESS   (Location/Symptom, Timing/Onset, Context/Setting, Quality, Duration, Modifying Factors, Severity)  Note limiting factors. Anjali Combs is a 58 y.o. female who presents to the emergency department      70-year-old female that presents with urinary frequency for a long time. Patient states that she does not know how long she has had it but it may be because she does not drink enough water. She denies fever chills, dysuria, urgency. The history is provided by the patient. Breast cancer mastectomy 10/18  Nursing Notes were reviewed. REVIEW OF SYSTEMS    (2-9 systems for level 4, 10 or more for level 5)     Review of Systems   Constitutional:  Negative for chills and fever. HENT:  Negative for congestion, rhinorrhea and sore throat. Eyes:  Negative for redness. Respiratory:  Negative for cough, shortness of breath and wheezing. Cardiovascular:  Negative for chest pain and palpitations. Gastrointestinal:  Negative for abdominal pain, diarrhea, nausea and vomiting. Genitourinary:  Positive for frequency. Negative for dysuria, flank pain and vaginal discharge. Musculoskeletal:  Negative for arthralgias and myalgias. Skin:  Negative for rash. Neurological:  Negative for headaches. Psychiatric/Behavioral:  Negative for confusion. Except as noted above the remainder of the review of systems was reviewed and negative.        PAST MEDICAL HISTORY     Past Medical History:   Diagnosis Date    Breast cancer ([facility] 2017    Right    Cancer ([facility]     right breast    GERD (gastroesophageal reflux disease)     Hepatitis B     Menopause     Radiation therapy complication     S/P chemotherapy, time since 4-12 weeks     Tuberculosis 1980    Wrist fracture, right 10/13/2018         SURGICAL HISTORY       Past Surgical History:   Procedure Laterality Date    BREAST BIOPSY      BREAST LUMPECTOMY  2017    BX/REMV,LYMPH NODE,DEEP AXILL Right 10/19/2018    Dr. Shaq Arnett      hysterectomy     HYSTERECTOMY (CERVIX STATUS UNKNOWN)      Total    [facility] CTR VAD W/SUBQ PORT AGE 5 YR/> N/A 03/08/2018    Dr. Bev Da Silva    IR PORT PLACEMENT EQUAL OR GREATER THAN 5 YEARS  1/16/2019    IR PORT PLACEMENT EQUAL OR GREATER THAN 5 YEARS 1/16/2019 [facility] RAD ANGIO IR    IR PORT PLACEMENT EQUAL OR GREATER THAN 5 YEARS  1/16/2019    MASTECTOMY Right 10/19/2018    RIGHT BREAST NEEDLE LOCALIZED LUMPECTOMY AND SENTINEL LYMPH NODE (LOC 0830; NUC 10.18.18 @ 0900) performed by Ana Hidalgo MD at [facility]      neck surgery     OVARY REMOVAL           CURRENT MEDICATIONS       Discharge Medication List as of 2/27/2023  3:46 PM        CONTINUE these medications which have NOT CHANGED    Details   ferrous sulfate (IRON 325) 325 (65 Fe) MG tablet Take 325 mg by mouth daily (with breakfast)Historical Med      bisacodyl (DULCOLAX) 5 MG EC tablet Take 5 mg by mouth daily as neededHistorical Med             ALLERGIES     Shellfish allergy    FAMILY HISTORY       Family History   Problem Relation Age of Onset    Heart Disease Father     Stroke Mother           SOCIAL HISTORY       Social History     Socioeconomic History    Marital status: Single   Tobacco Use    Smoking status: Never    Smokeless tobacco: Current   Substance and Sexual Activity    Alcohol use: No     Alcohol/week: 2.0 standard drinks    Drug use: No       SCREENINGS         Hang Coma Scale  Eye Opening: Spontaneous  Best Verbal Response: Oriented  Best Motor Response: Obeys commands  [place] Coma Scale Score: 15                     CIWA Assessment  BP: 130/77  Heart Rate: 77                 PHYSICAL EXAM    (up to 7 for level 4, 8 or more for level 5)     ED Triage Vitals   BP Temp Temp src Pulse Resp SpO2 Height Weight   -- -- -- -- -- -- -- --       Physical Exam  Vitals and nursing note reviewed. Constitutional:       General: She is not in acute distress. Appearance: Normal appearance. She is not ill-appearing or toxic-appearing. HENT:      Mouth/Throat:      Pharynx: Oropharynx is clear. Eyes:      Extraocular Movements: Extraocular movements intact. Conjunctiva/sclera: Conjunctivae normal.   Cardiovascular:      Rate and Rhythm: Regular rhythm. Heart sounds: Normal heart sounds. No murmur heard. Pulmonary:      Effort: Pulmonary effort is normal. No respiratory distress. Breath sounds: Normal breath sounds. No wheezing or rales. Abdominal:      General: There is no distension. Palpations: Abdomen is soft. Tenderness: There is no abdominal tenderness. Musculoskeletal:         General: Normal range of motion. Cervical back: Neck supple. Skin:     General: Skin is warm. Neurological:      Mental Status: She is alert and oriented to person, place, and time.    Psychiatric:         Behavior: Behavior normal.       DIAGNOSTIC RESULTS     EKG: All EKG's are interpreted by the Emergency Department Physician who either signs or Co-signs this chart in the absence of a cardiologist.      RADIOLOGY:   Non-plain film images such as CT, Ultrasound and MRI are read by the radiologist. Plain radiographic images are visualized and preliminarily interpreted by the emergency physician with the below findings:      Interpretation per the Radiologist below, if available at the time of this note:    No orders to display         ED BEDSIDE ULTRASOUND:   Performed by ED Physician - none    LABS:  Labs Reviewed   URINALYSIS - Abnormal; Notable for the following components:       Result Value    Nitrite, Urine Positive (*)     Leukocyte Esterase, Urine Moderate (*)     All other components within normal limits URINALYSIS, MICRO - Abnormal; Notable for the following components:    BACTERIA, URINE 2+ (*)     All other components within normal limits   CULTURE, URINE       All other labs were within normal range or not returned as of this dictation. EMERGENCY DEPARTMENT COURSE and DIFFERENTIAL DIAGNOSIS/MDM:   Vitals:    Vitals:    02/27/23 1535   BP: 130/77   Pulse: 77   Resp: 16   Temp: 98.1 °F (36.7 °C)   SpO2: 80       80-year-old black female with urinary symptoms. Her history is very unclear and that she states that she is does not know how long she has had it and she denies dysuria. Diagnosis includes cystitis, pyelonephritis, vaginitis, interstitial cystitis, urethral syndrome, genital herpes, atrophic vaginitis,. Will check urine analysis. Signs  and examination stable. Medical Decision Making  Amount and/or Complexity of Data Reviewed  Labs: ordered. Risk  Prescription drug management. REASSESSMENT          CRITICAL CARE TIME       CONSULTS:  None    PROCEDURES:  Unless otherwise noted below, none     Procedures    FINAL IMPRESSION      1. Acute cystitis without hematuria          DISPOSITION/PLAN   DISPOSITION Decision To Discharge 02/27/2023 03:29:26 PM      PATIENT REFERRED TO:  Thom Trimble, [address] [address]  [phone]    Schedule an appointment as soon as possible for a visit in 3 days      DISCHARGE MEDICATIONS:  Discharge Medication List as of 2/27/2023  3:46 PM        START taking these medications    Details   cephALEXin (KEFLEX) 500 MG capsule Take 1 capsule by mouth 3 times daily for 7 days, Disp-21 capsule, R-0Normal           Controlled Substances Monitoring:     No flowsheet data found.     (Please note that portions of this note were completed with a voice recognition program.  Efforts were made to edit the dictations but occasionally words are mis-transcribed.)    Reed Howell MD (electronically signed)  Attending Emergency Physician           Basil Rodriguez MD  02/28/23 3732

## 2023-02-27 NOTE — ED TRIAGE NOTES
Pt states she has had urinary frequency and strong smelling urine for \"a while\"  states greater than a month, but she is unsure of just how long

## 2023-03-02 LAB
BACTERIA SPEC CULT: ABNORMAL
COLONY COUNT, CNT: ABNORMAL
Lab: ABNORMAL

## 2024-04-24 ENCOUNTER — HOSPITAL ENCOUNTER (EMERGENCY)
Age: 64
Discharge: HOME | End: 2024-04-24
Attending: FAMILY MEDICINE
Payer: MEDICAID

## 2024-04-24 VITALS
SYSTOLIC BLOOD PRESSURE: 143 MMHG | BODY MASS INDEX: 22.49 KG/M2 | DIASTOLIC BLOOD PRESSURE: 90 MMHG | OXYGEN SATURATION: 100 % | WEIGHT: 135 LBS | TEMPERATURE: 98.2 F | HEART RATE: 74 BPM | HEIGHT: 65 IN | RESPIRATION RATE: 12 BRPM

## 2024-04-24 DIAGNOSIS — S39.012A STRAIN OF MUSCLE, FASCIA AND TENDON OF LOWER BACK, INITIAL ENCOUNTER: ICD-10-CM

## 2024-04-24 DIAGNOSIS — K52.9 GASTROENTERITIS: Primary | ICD-10-CM

## 2024-04-24 PROCEDURE — 6370000000 HC RX 637 (ALT 250 FOR IP): Performed by: FAMILY MEDICINE

## 2024-04-24 PROCEDURE — 99283 EMERGENCY DEPT VISIT LOW MDM: CPT

## 2024-04-24 RX ORDER — BACLOFEN 10 MG/1
10 TABLET ORAL
Status: COMPLETED | OUTPATIENT
Start: 2024-04-24 | End: 2024-04-24

## 2024-04-24 RX ORDER — BACLOFEN 5 MG/1
5 TABLET ORAL 3 TIMES DAILY PRN
Qty: 21 TABLET | Refills: 0 | Status: SHIPPED | OUTPATIENT
Start: 2024-04-24

## 2024-04-24 RX ORDER — ONDANSETRON 4 MG/1
4 TABLET, ORALLY DISINTEGRATING ORAL
Status: COMPLETED | OUTPATIENT
Start: 2024-04-24 | End: 2024-04-24

## 2024-04-24 RX ORDER — ONDANSETRON 4 MG/1
4 TABLET, ORALLY DISINTEGRATING ORAL 3 TIMES DAILY PRN
Qty: 21 TABLET | Refills: 0 | Status: SHIPPED | OUTPATIENT
Start: 2024-04-24

## 2024-04-24 RX ADMIN — BACLOFEN 10 MG: 10 TABLET ORAL at 19:39

## 2024-04-24 RX ADMIN — ONDANSETRON 4 MG: 4 TABLET, ORALLY DISINTEGRATING ORAL at 19:39

## 2024-04-24 ASSESSMENT — ENCOUNTER SYMPTOMS
RESPIRATORY NEGATIVE: 1
ABDOMINAL PAIN: 0
BACK PAIN: 1
VOMITING: 1
NAUSEA: 1

## 2024-04-24 NOTE — ED TRIAGE NOTES
Reports that she vomited twice between noon and 1300 today causing back pain. Last ate, a donut, about 0600 today.

## 2024-04-25 NOTE — ED PROVIDER NOTES
dictations but occasionally words are mis-transcribed.)    Cristel Lugo DO (electronically signed)  Attending Emergency Physician            Cristel Lugo,   04/24/24 [id number]

## 2024-10-15 ENCOUNTER — TRANSCRIBE ORDERS (OUTPATIENT)
Facility: HOSPITAL | Age: 64
End: 2024-10-15

## 2024-10-15 DIAGNOSIS — Z12.31 VISIT FOR SCREENING MAMMOGRAM: Primary | ICD-10-CM

## 2024-10-29 ENCOUNTER — HOSPITAL ENCOUNTER (OUTPATIENT)
Age: 64
Discharge: HOME | End: 2024-11-01
Payer: MEDICAID

## 2024-10-29 VITALS — BODY MASS INDEX: 22.49 KG/M2 | WEIGHT: 135 LBS | HEIGHT: 65 IN

## 2024-10-29 DIAGNOSIS — Z12.31 VISIT FOR SCREENING MAMMOGRAM: ICD-10-CM

## 2024-10-29 PROCEDURE — 77063 BREAST TOMOSYNTHESIS BI: CPT

## 2025-03-06 ENCOUNTER — APPOINTMENT (OUTPATIENT)
Age: 65
End: 2025-03-06
Payer: MEDICAID

## 2025-03-06 ENCOUNTER — HOSPITAL ENCOUNTER (EMERGENCY)
Age: 65
Discharge: HOME | End: 2025-03-06
Attending: EMERGENCY MEDICINE
Payer: MEDICAID

## 2025-03-06 VITALS
HEART RATE: 95 BPM | TEMPERATURE: 98.9 F | SYSTOLIC BLOOD PRESSURE: 112 MMHG | OXYGEN SATURATION: 97 % | DIASTOLIC BLOOD PRESSURE: 70 MMHG | RESPIRATION RATE: 20 BRPM | HEIGHT: 68 IN | BODY MASS INDEX: 19.55 KG/M2 | WEIGHT: 129 LBS

## 2025-03-06 DIAGNOSIS — J11.1 INFLUENZA: Primary | ICD-10-CM

## 2025-03-06 LAB
FLUAV RNA SPEC QL NAA+PROBE: DETECTED
FLUBV RNA SPEC QL NAA+PROBE: NOT DETECTED
SARS-COV-2 RNA RESP QL NAA+PROBE: NOT DETECTED

## 2025-03-06 PROCEDURE — 71046 X-RAY EXAM CHEST 2 VIEWS: CPT

## 2025-03-06 PROCEDURE — 6370000000 HC RX 637 (ALT 250 FOR IP): Performed by: EMERGENCY MEDICINE

## 2025-03-06 PROCEDURE — 87636 SARSCOV2 & INF A&B AMP PRB: CPT

## 2025-03-06 PROCEDURE — 99284 EMERGENCY DEPT VISIT MOD MDM: CPT

## 2025-03-06 RX ORDER — ONDANSETRON 4 MG/1
4 TABLET, ORALLY DISINTEGRATING ORAL 3 TIMES DAILY PRN
Qty: 21 TABLET | Refills: 0 | Status: SHIPPED | OUTPATIENT
Start: 2025-03-06

## 2025-03-06 RX ORDER — GUAIFENESIN/DEXTROMETHORPHAN 100-10MG/5
5 SYRUP ORAL
Status: COMPLETED | OUTPATIENT
Start: 2025-03-06 | End: 2025-03-06

## 2025-03-06 RX ORDER — ACETAMINOPHEN 500 MG
500 TABLET ORAL 4 TIMES DAILY PRN
Qty: 30 TABLET | Refills: 0 | Status: SHIPPED | OUTPATIENT
Start: 2025-03-06

## 2025-03-06 RX ORDER — BENZONATATE 100 MG/1
100-200 CAPSULE ORAL 3 TIMES DAILY PRN
Qty: 60 CAPSULE | Refills: 0 | Status: SHIPPED | OUTPATIENT
Start: 2025-03-06 | End: 2025-03-16

## 2025-03-06 RX ORDER — IBUPROFEN 400 MG/1
400 TABLET, FILM COATED ORAL EVERY 6 HOURS PRN
Qty: 120 TABLET | Refills: 0 | Status: SHIPPED | OUTPATIENT
Start: 2025-03-06

## 2025-03-06 RX ORDER — IBUPROFEN 400 MG/1
400 TABLET, FILM COATED ORAL ONCE
Status: COMPLETED | OUTPATIENT
Start: 2025-03-06 | End: 2025-03-06

## 2025-03-06 RX ORDER — FEXOFENADINE HCL AND PSEUDOEPHEDRINE HCI 60; 120 MG/1; MG/1
1 TABLET, EXTENDED RELEASE ORAL 2 TIMES DAILY
Qty: 20 TABLET | Refills: 0 | Status: SHIPPED | OUTPATIENT
Start: 2025-03-06

## 2025-03-06 RX ORDER — OSELTAMIVIR PHOSPHATE 75 MG/1
75 CAPSULE ORAL 2 TIMES DAILY
Qty: 10 CAPSULE | Refills: 0 | Status: SHIPPED | OUTPATIENT
Start: 2025-03-06 | End: 2025-03-11

## 2025-03-06 RX ORDER — GUAIFENESIN/DEXTROMETHORPHAN 100-10MG/5
5 SYRUP ORAL 3 TIMES DAILY PRN
Qty: 120 ML | Refills: 0 | Status: SHIPPED | OUTPATIENT
Start: 2025-03-06 | End: 2025-03-16

## 2025-03-06 RX ORDER — DIPHENHYDRAMINE HCL 25 MG
25 CAPSULE ORAL EVERY 6 HOURS PRN
Qty: 20 CAPSULE | Refills: 0 | Status: SHIPPED | OUTPATIENT
Start: 2025-03-06 | End: 2025-03-16

## 2025-03-06 RX ORDER — ACETAMINOPHEN 500 MG
500 TABLET ORAL
Status: COMPLETED | OUTPATIENT
Start: 2025-03-06 | End: 2025-03-06

## 2025-03-06 RX ADMIN — IBUPROFEN 400 MG: 400 TABLET, FILM COATED ORAL at 11:16

## 2025-03-06 RX ADMIN — ACETAMINOPHEN 500 MG: 500 TABLET ORAL at 11:16

## 2025-03-06 RX ADMIN — GUAIFENESIN AND DEXTROMETHORPHAN 5 ML: 100; 10 SYRUP ORAL at 11:17

## 2025-03-06 ASSESSMENT — PAIN - FUNCTIONAL ASSESSMENT: PAIN_FUNCTIONAL_ASSESSMENT: NONE - DENIES PAIN

## 2025-03-06 NOTE — DISCHARGE INSTRUCTIONS
Thank you for choosing our Emergency Department for your care.  It is our privilege to care for you in your time of need.  In the next several days, you may receive a survey via email or mailed to your home about your experience with our team.  We would greatly appreciate you taking a few minutes to complete the survey, as we use this information to learn what we have done well and what we could be doing better. Thank you for trusting us with your care!    Below you will find a list of your tests from today's visit.   Labs and Radiology Studies  Recent Results (from the past 12 hour(s))   COVID-19 & Influenza Combo    Collection Time: 03/06/25 10:35 AM    Specimen: Nasopharyngeal   Result Value Ref Range    SARS-CoV-2, PCR Not Detected Not Detected      Rapid Influenza A By PCR DETECTED (A) Not Detected      Rapid Influenza B By PCR Not Detected Not Detected       XR CHEST (2 VW)    Result Date: 3/6/2025  EXAM: XR CHEST (2 VW) INDICATION:  cough COMPARISON: 4/22/2022 TECHNIQUE: PA and lateral chest views FINDINGS: The cardiac size is within normal limits. The pulmonary vasculature is within normal limits. The lungs and pleural spaces are clear. Granulomatous changes right upper lobe an area of scarring/pleural thickening on the left is unchanged the visualized bones and upper abdomen are age-appropriate.     No acute abnormality Electronically signed by Migel Ho    ------------------------------------------------------------------------------------------------------------  The evaluation and treatment you received in the Emergency Department were for an urgent problem. It is important that you follow-up with a doctor, nurse practitioner, or physician assistant to:  (1) confirm your diagnosis,  (2) re-evaluation of changes in your illness and treatment, and (3) for ongoing care. Please take your discharge instructions with you when you go to your follow-up appointment.     If you have any problem arranging a

## 2025-03-06 NOTE — ED PROVIDER NOTES
at risk (10/22/2018)    Received from [facility]  (prior to 6/17/2023), [facility]  (prior to 6/17/2023)    PHQ-2    • PHQ-2 Score: 0   Housing Stability: Not on file   Interpersonal Safety: Not on file   Utilities: Not on file       PHYSICAL EXAM   Physical Exam  Vitals and nursing note reviewed.   Constitutional:       Appearance: Normal appearance.   HENT:      Head: Normocephalic and atraumatic.      Right Ear: External ear normal.      Left Ear: External ear normal.      Nose: Nose normal.      Mouth/Throat:      Mouth: Mucous membranes are moist.   Cardiovascular:      Rate and Rhythm: Normal rate and regular rhythm.   Pulmonary:      Effort: Pulmonary effort is normal.   Abdominal:      Palpations: Abdomen is soft.   Musculoskeletal:         General: Normal range of motion.      Cervical back: Normal range of motion.   Skin:     General: Skin is warm.      Capillary Refill: Capillary refill takes less than 2 seconds.   Neurological:      General: No focal deficit present.      Mental Status: She is alert.   Psychiatric:         Mood and Affect: Mood normal.         Behavior: Behavior normal.          SCREENINGS                No data recorded    LAB, EKG AND DIAGNOSTIC RESULTS   Labs:  Recent Results (from the past 12 hour(s))   COVID-19 & Influenza Combo    Collection Time: 03/06/25 10:35 AM    Specimen: Nasopharyngeal   Result Value Ref Range    SARS-CoV-2, PCR Not Detected Not Detected      Rapid Influenza A By PCR DETECTED (A) Not Detected      Rapid Influenza B By PCR Not Detected Not Detected          Radiologic Studies:  Non-plain film images such as CT, Ultrasound and MRI are read by the radiologist. Plain radiographic images are visualized and preliminarily interpreted by the ED Provider with the following findings: Not Applicable.    Interpretation per the Radiologist below, if available at the time of this note:  XR CHEST (2 VW)   Final Result      No acute    DISPOSITION Decision To Discharge 03/06/2025 11:27:25 AM   DISPOSITION CONDITION Stable        Discharge Note: The patient is stable for discharge home. The signs, symptoms, diagnosis, and discharge instructions have been discussed, understanding conveyed, and agreed upon. The patient is to follow up as recommended or return to ER should their symptoms worsen. 3     PATIENT REFERRED TO:  Danie Jones MD  [address] [address] [phone]    Schedule an appointment as soon as possible for a visit   for followup    [facility] Emergency Department  [address] [facility] [address] [phone]  Go to   As needed, If symptoms worsen        DISCHARGE MEDICATIONS:     Medication List        START taking these medications      acetaminophen 500 MG tablet  Commonly known as: TYLENOL  Take 1 tablet by mouth 4 times daily as needed for Pain     benzonatate 100 MG capsule  Commonly known as: TESSALON  Take 1-2 capsules by mouth 3 times daily as needed for Cough     diphenhydrAMINE 25 MG capsule  Commonly known as: BENADRYL  Take 1 capsule by mouth every 6 hours as needed for Itching     fexofenadine-pseudoephedrine  MG per extended release tablet  Commonly known as: ALLEGRA-D 12HR  Take 1 tablet by mouth 2 times daily     guaiFENesin-dextromethorphan 100-10 MG/5ML syrup  Commonly known as: ROBITUSSIN DM  Take 5 mLs by mouth 3 times daily as needed for Cough     ibuprofen 400 MG tablet  Commonly known as: IBU  Take 1 tablet by mouth every 6 hours as needed for Pain     oseltamivir 75 MG capsule  Commonly known as: TAMIFLU  Take 1 capsule by mouth 2 times daily for 5 days            CONTINUE taking these medications      ondansetron 4 MG disintegrating tablet  Commonly known as: ZOFRAN-ODT  Take 1 tablet by mouth 3 times daily as needed for Nausea or Vomiting            ASK your doctor about these medications      Baclofen 5 MG tablet  Commonly known as: LIORESAL  Take 1

## (undated) DEVICE — SUTURE MCRYL SZ 4-0 L18IN ABSRB UD L19MM PS-2 3/8 CIR PRIM Y496G

## (undated) DEVICE — SUTURE PERMA-HAND 3-0 L30IN NONABSORBABLE BLK ST-1 L45MM K852H

## (undated) DEVICE — GAUZE,SPONGE,4"X4",12PLY,STERILE,LF,2'S: Brand: MEDLINE

## (undated) DEVICE — GLOVE SURG SZ 8 L11.77IN FNGR THK9.8MIL STRW LTX POLYMER

## (undated) DEVICE — 3M™ WARMING BLANKET, LOWER BODY, 10 PER CASE, 42568: Brand: BAIR HUGGER™

## (undated) DEVICE — INTENDED FOR TISSUE SEPARATION, AND OTHER PROCEDURES THAT REQUIRE A SHARP SURGICAL BLADE TO PUNCTURE OR CUT.: Brand: BARD-PARKER ®  SAFETY SCALPED

## (undated) DEVICE — DERMABOND SKIN ADH 0.7ML -- DERMABOND ADVANCED 12/BX

## (undated) DEVICE — Device

## (undated) DEVICE — 4-PORT MANIFOLD: Brand: NEPTUNE 2

## (undated) DEVICE — 3M™ TEGADERM™ TRANSPARENT FILM DRESSING FRAME STYLE, 1626W, 4 IN X 4-3/4 IN (10 CM X 12 CM), 50/CT 4CT/CASE: Brand: 3M™ TEGADERM™

## (undated) DEVICE — SUTURE VCRL SZ 3-0 L27IN ABSRB UD L36MM CT-1 1/2 CIR J258H

## (undated) DEVICE — INTENDED FOR TISSUE SEPARATION, AND OTHER PROCEDURES THAT REQUIRE A SHARP SURGICAL BLADE TO PUNCTURE OR CUT.: Brand: BARD-PARKER SAFETY BLADES SIZE 10, STERILE

## (undated) DEVICE — GAUZE SPONGES,USP TYPE VII GAUZE, 12 PLY: Brand: CURITY

## (undated) DEVICE — PACK PROCEDURE SURG MAJ W/ BASIN LF

## (undated) DEVICE — KIT CLN UP BON SECOURS MARYV

## (undated) DEVICE — STERILE LATEX POWDER-FREE SURGICAL GLOVESWITH NITRILE COATING: Brand: PROTEXIS

## (undated) DEVICE — COVER LT HNDL BLU STRL -- MEDICHOICE

## (undated) DEVICE — SUT SLK 2-0SH 30IN BLK --

## (undated) DEVICE — SUTURE VCRL SZ 3-0 L18IN ABSRB UD POLYGLACTIN 910 BRAID TIE J910T

## (undated) DEVICE — BLANKET WRM AD W50XL85.8IN PACU FULL BODY FORC AIR

## (undated) DEVICE — 3M™ BAIR PAWS FLEX™ WARMING GOWN, STANDARD, 20 PER CASE 81003: Brand: BAIR PAWS™

## (undated) DEVICE — 3M™ MEDIPORE™ H SOFT CLOTH SURGICAL TAPE 2864, 4 INCH X 10 YARD (10CM X 9,14M), 12 ROLLS/CASE: Brand: 3M™ MEDIPORE™

## (undated) DEVICE — SMOKE EVACUATION PENCIL: Brand: VALLEYLAB

## (undated) DEVICE — SUT PROL 2-0 30IN CT1 BLU --

## (undated) DEVICE — THREE-QUARTER SHEET: Brand: CONVERTORS

## (undated) DEVICE — STERILE POLYISOPRENE POWDER-FREE SURGICAL GLOVES: Brand: PROTEXIS

## (undated) DEVICE — REM POLYHESIVE ADULT PATIENT RETURN ELECTRODE: Brand: VALLEYLAB

## (undated) DEVICE — SUTURE VCRL SZ 3-0 L27IN ABSRB UD L26MM SH 1/2 CIR J416H

## (undated) DEVICE — SUTURE MCRYL SZ 4-0 L27IN ABSRB UD L24MM PS-1 3/8 CIR PRIM Y935H

## (undated) DEVICE — SHEET, T, LAPAROTOMY, STERILE: Brand: MEDLINE

## (undated) DEVICE — GLOVE SURG BIOGEL 8.0 STRL -- SKINSENSE

## (undated) DEVICE — PREP SKN CHLRAPRP APL 26ML STR --

## (undated) DEVICE — DRAPE,TOP,102X53,STERILE: Brand: MEDLINE

## (undated) DEVICE — KENDALL SCD EXPRESS SLEEVES, KNEE LENGTH, MEDIUM: Brand: KENDALL SCD

## (undated) DEVICE — INTENDED FOR TISSUE SEPARATION, AND OTHER PROCEDURES THAT REQUIRE A SHARP SURGICAL BLADE TO PUNCTURE OR CUT.: Brand: BARD-PARKER SAFETY BLADES SIZE 15, STERILE

## (undated) DEVICE — (D)PREP SKN CHLRAPRP APPL 26ML -- CONVERT TO ITEM 371833

## (undated) DEVICE — APPLICATOR BNDG 1MM ADH PREMIERPRO EXOFIN

## (undated) DEVICE — GDWIRE URET STR STD .038X150 -- ZIPWIRE STD

## (undated) DEVICE — SOLUTION IV 1000ML 0.9% SOD CHL

## (undated) DEVICE — FLEX ADVANTAGE 3000CC: Brand: FLEX ADVANTAGE

## (undated) DEVICE — GUIDEWIRE URO L150CM DIA0.035IN TAPR 8CM STR TIP STD SHFT

## (undated) DEVICE — SUTURE VCRL SZ 2-0 L36IN ABSRB UD L36MM CT-1 1/2 CIR J945H

## (undated) DEVICE — SUTURE PERMA-HAND SZ 3-0 L24IN NONABSORBABLE BLK W/O NDL SA74H

## (undated) DEVICE — DRAPE XR C ARM 41X74IN LF --

## (undated) DEVICE — DRAPE TWL SURG 16X26IN BLU ORB04] ALLCARE INC]

## (undated) DEVICE — NEEDLE HYPO 25GA L1.5IN BVL ORIENTED ECLIPSE

## (undated) DEVICE — TRAY PREP DRY W/ PREM GLV 2 APPL 6 SPNG 2 UNDPD 1 OVERWRAP

## (undated) DEVICE — SPONGE LAP 18X18IN STRL -- 5/PK

## (undated) DEVICE — SHEET, DRAPE, SPLIT, STERILE: Brand: MEDLINE

## (undated) DEVICE — SINGLE PORT MANIFOLD: Brand: NEPTUNE 2

## (undated) DEVICE — HEX-LOCKING BLADE ELECTRODE: Brand: EDGE

## (undated) DEVICE — GARMENT,MEDLINE,DVT,INT,CALF,MED, GEN2: Brand: MEDLINE

## (undated) DEVICE — SUTURE VCRL SZ 4-0 L18IN ABSRB UD L19MM PC-5 3/8 CIR J823G

## (undated) DEVICE — MARKER,SKIN,WI/RULER AND LABELS: Brand: MEDLINE

## (undated) DEVICE — SUTURE PERMA-HAND SZ 2-0 L30IN NONABSORBABLE BLK L30MM FSL 679H